# Patient Record
Sex: FEMALE | Race: WHITE | NOT HISPANIC OR LATINO | Employment: FULL TIME | URBAN - METROPOLITAN AREA
[De-identification: names, ages, dates, MRNs, and addresses within clinical notes are randomized per-mention and may not be internally consistent; named-entity substitution may affect disease eponyms.]

---

## 2010-03-04 LAB — EXTERNAL HIV SCREEN: NORMAL

## 2017-02-26 ENCOUNTER — HOSPITAL ENCOUNTER (EMERGENCY)
Facility: HOSPITAL | Age: 37
Discharge: HOME | End: 2017-02-26
Attending: EMERGENCY MEDICINE | Admitting: EMERGENCY MEDICINE
Payer: COMMERCIAL

## 2017-02-26 VITALS
HEIGHT: 63 IN | SYSTOLIC BLOOD PRESSURE: 129 MMHG | BODY MASS INDEX: 20.66 KG/M2 | DIASTOLIC BLOOD PRESSURE: 66 MMHG | WEIGHT: 116.62 LBS | OXYGEN SATURATION: 100 % | TEMPERATURE: 98.3 F | RESPIRATION RATE: 18 BRPM | HEART RATE: 79 BPM

## 2017-02-26 DIAGNOSIS — IMO0002 ABSCESS, ABDOMEN: ICD-10-CM

## 2017-02-26 DIAGNOSIS — L02.01 ABSCESS OF CHIN: Primary | ICD-10-CM

## 2017-02-26 PROCEDURE — A9270 NON-COVERED ITEM OR SERVICE: HCPCS | Performed by: PHYSICIAN ASSISTANT

## 2017-02-26 PROCEDURE — 99282 EMERGENCY DEPT VISIT SF MDM: CPT

## 2017-02-26 RX ORDER — SULFAMETHOXAZOLE AND TRIMETHOPRIM 800; 160 MG/1; MG/1
1 TABLET ORAL DAILY
COMMUNITY
Start: 2017-02-23 | End: 2017-03-02

## 2017-02-26 RX ORDER — CEPHALEXIN 500 MG/1
500 CAPSULE ORAL 3 TIMES DAILY
Qty: 30 CAPSULE | Refills: 0 | Status: SHIPPED | OUTPATIENT
Start: 2017-02-26 | End: 2017-03-08

## 2017-02-26 RX ORDER — SULFAMETHOXAZOLE AND TRIMETHOPRIM 800; 160 MG/1; MG/1
1 TABLET ORAL ONCE
Status: COMPLETED | OUTPATIENT
Start: 2017-02-26 | End: 2017-02-26

## 2017-02-26 RX ORDER — CHLORHEXIDINE GLUCONATE 4 G/100ML
1 SOLUTION TOPICAL DAILY PRN
Qty: 118 ML | Refills: 0 | Status: SHIPPED | OUTPATIENT
Start: 2017-02-26 | End: 2017-03-28

## 2017-02-26 RX ORDER — CEPHALEXIN 500 MG/1
500 CAPSULE ORAL ONCE
Status: COMPLETED | OUTPATIENT
Start: 2017-02-26 | End: 2017-02-26

## 2017-02-26 RX ORDER — SULFAMETHOXAZOLE AND TRIMETHOPRIM 800; 160 MG/1; MG/1
1 TABLET ORAL 2 TIMES DAILY
Qty: 20 TABLET | Refills: 0 | Status: SHIPPED | OUTPATIENT
Start: 2017-02-26 | End: 2017-03-08

## 2017-02-26 RX ADMIN — Medication 1 APPLICATION: at 10:27

## 2017-02-26 RX ADMIN — SULFAMETHOXAZOLE AND TRIMETHOPRIM 1 TABLET: 800; 160 TABLET ORAL at 10:27

## 2017-02-26 RX ADMIN — CEPHALEXIN 500 MG: 500 CAPSULE ORAL at 10:27

## 2017-09-10 ENCOUNTER — APPOINTMENT (EMERGENCY)
Dept: CT IMAGING | Facility: HOSPITAL | Age: 37
End: 2017-09-10
Payer: COMMERCIAL

## 2017-09-10 ENCOUNTER — HOSPITAL ENCOUNTER (EMERGENCY)
Facility: HOSPITAL | Age: 37
Discharge: HOME | End: 2017-09-10
Attending: EMERGENCY MEDICINE
Payer: COMMERCIAL

## 2017-09-10 VITALS
TEMPERATURE: 98.4 F | RESPIRATION RATE: 16 BRPM | HEART RATE: 73 BPM | HEIGHT: 63 IN | WEIGHT: 116.84 LBS | OXYGEN SATURATION: 100 % | SYSTOLIC BLOOD PRESSURE: 98 MMHG | DIASTOLIC BLOOD PRESSURE: 51 MMHG | BODY MASS INDEX: 20.7 KG/M2

## 2017-09-10 DIAGNOSIS — R10.9 ABDOMINAL PAIN: Primary | ICD-10-CM

## 2017-09-10 LAB
ALBUMIN SERPL BCP-MCNC: 4 G/DL (ref 3.5–5)
ALP SERPL-CCNC: 53 U/L (ref 46–116)
ALT SERPL W P-5'-P-CCNC: 15 U/L (ref 12–78)
ANION GAP SERPL CALCULATED.3IONS-SCNC: 7 MMOL/L (ref 4–13)
AST SERPL W P-5'-P-CCNC: 13 U/L (ref 5–45)
BASOPHILS # BLD AUTO: 0.07 THOUSANDS/ΜL (ref 0–0.1)
BASOPHILS NFR BLD AUTO: 1 % (ref 0–1)
BILIRUB SERPL-MCNC: 0.4 MG/DL (ref 0.2–1)
BILIRUB UR QL STRIP: NEGATIVE
BUN SERPL-MCNC: 16 MG/DL (ref 5–25)
CALCIUM SERPL-MCNC: 8.9 MG/DL (ref 8.3–10.1)
CHLORIDE SERPL-SCNC: 104 MMOL/L (ref 100–108)
CLARITY UR: CLEAR
CO2 SERPL-SCNC: 30 MMOL/L (ref 21–32)
COLOR UR: YELLOW
CREAT SERPL-MCNC: 0.73 MG/DL (ref 0.6–1.3)
EOSINOPHIL # BLD AUTO: 0.45 THOUSAND/ΜL (ref 0–0.61)
EOSINOPHIL NFR BLD AUTO: 6 % (ref 0–6)
ERYTHROCYTE [DISTWIDTH] IN BLOOD BY AUTOMATED COUNT: 12 % (ref 11.6–15.1)
GFR SERPL CREATININE-BSD FRML MDRD: 107 ML/MIN/1.73SQ M
GLUCOSE SERPL-MCNC: 90 MG/DL (ref 65–140)
GLUCOSE UR STRIP-MCNC: NEGATIVE MG/DL
HCG UR QL: NEGATIVE
HCT VFR BLD AUTO: 41.8 % (ref 34.8–46.1)
HGB BLD-MCNC: 14 G/DL (ref 11.5–15.4)
HGB UR QL STRIP.AUTO: NEGATIVE
KETONES UR STRIP-MCNC: NEGATIVE MG/DL
LACTATE SERPL-SCNC: 0.8 MMOL/L (ref 0.5–2)
LEUKOCYTE ESTERASE UR QL STRIP: NEGATIVE
LIPASE SERPL-CCNC: 215 U/L (ref 73–393)
LYMPHOCYTES # BLD AUTO: 1.72 THOUSANDS/ΜL (ref 0.6–4.47)
LYMPHOCYTES NFR BLD AUTO: 23 % (ref 14–44)
MCH RBC QN AUTO: 31 PG (ref 26.8–34.3)
MCHC RBC AUTO-ENTMCNC: 33.5 G/DL (ref 31.4–37.4)
MCV RBC AUTO: 93 FL (ref 82–98)
MONOCYTES # BLD AUTO: 0.51 THOUSAND/ΜL (ref 0.17–1.22)
MONOCYTES NFR BLD AUTO: 7 % (ref 4–12)
NEUTROPHILS # BLD AUTO: 4.59 THOUSANDS/ΜL (ref 1.85–7.62)
NEUTS SEG NFR BLD AUTO: 62 % (ref 43–75)
NITRITE UR QL STRIP: NEGATIVE
NRBC BLD AUTO-RTO: 0 /100 WBCS
PH UR STRIP.AUTO: 6 [PH] (ref 4.5–8)
PLATELET # BLD AUTO: 266 THOUSANDS/UL (ref 149–390)
PMV BLD AUTO: 9 FL (ref 8.9–12.7)
POTASSIUM SERPL-SCNC: 4 MMOL/L (ref 3.5–5.3)
PROT SERPL-MCNC: 7.8 G/DL (ref 6.4–8.2)
PROT UR STRIP-MCNC: NEGATIVE MG/DL
RBC # BLD AUTO: 4.52 MILLION/UL (ref 3.81–5.12)
SODIUM SERPL-SCNC: 141 MMOL/L (ref 136–145)
SP GR UR STRIP.AUTO: 1.02 (ref 1–1.03)
UROBILINOGEN UR QL STRIP.AUTO: 0.2 E.U./DL
WBC # BLD AUTO: 7.37 THOUSAND/UL (ref 4.31–10.16)

## 2017-09-10 PROCEDURE — 81003 URINALYSIS AUTO W/O SCOPE: CPT | Performed by: EMERGENCY MEDICINE

## 2017-09-10 PROCEDURE — 96374 THER/PROPH/DIAG INJ IV PUSH: CPT

## 2017-09-10 PROCEDURE — 81025 URINE PREGNANCY TEST: CPT | Performed by: EMERGENCY MEDICINE

## 2017-09-10 PROCEDURE — 85025 COMPLETE CBC W/AUTO DIFF WBC: CPT | Performed by: EMERGENCY MEDICINE

## 2017-09-10 PROCEDURE — 87491 CHLMYD TRACH DNA AMP PROBE: CPT | Performed by: EMERGENCY MEDICINE

## 2017-09-10 PROCEDURE — 83605 ASSAY OF LACTIC ACID: CPT | Performed by: EMERGENCY MEDICINE

## 2017-09-10 PROCEDURE — 80053 COMPREHEN METABOLIC PANEL: CPT | Performed by: EMERGENCY MEDICINE

## 2017-09-10 PROCEDURE — 96361 HYDRATE IV INFUSION ADD-ON: CPT

## 2017-09-10 PROCEDURE — 74177 CT ABD & PELVIS W/CONTRAST: CPT

## 2017-09-10 PROCEDURE — 83690 ASSAY OF LIPASE: CPT | Performed by: EMERGENCY MEDICINE

## 2017-09-10 PROCEDURE — 87591 N.GONORRHOEAE DNA AMP PROB: CPT | Performed by: EMERGENCY MEDICINE

## 2017-09-10 PROCEDURE — 36415 COLL VENOUS BLD VENIPUNCTURE: CPT | Performed by: EMERGENCY MEDICINE

## 2017-09-10 PROCEDURE — 99284 EMERGENCY DEPT VISIT MOD MDM: CPT

## 2017-09-10 RX ORDER — KETOROLAC TROMETHAMINE 30 MG/ML
15 INJECTION, SOLUTION INTRAMUSCULAR; INTRAVENOUS ONCE
Status: COMPLETED | OUTPATIENT
Start: 2017-09-10 | End: 2017-09-10

## 2017-09-10 RX ORDER — POLYETHYLENE GLYCOL 3350 17 G/17G
17 POWDER, FOR SOLUTION ORAL DAILY PRN
Qty: 5 EACH | Refills: 0 | Status: SHIPPED | OUTPATIENT
Start: 2017-09-10 | End: 2020-01-17

## 2017-09-10 RX ORDER — NAPROXEN 500 MG/1
500 TABLET ORAL EVERY 12 HOURS PRN
Qty: 20 TABLET | Refills: 0 | Status: SHIPPED | OUTPATIENT
Start: 2017-09-10 | End: 2020-01-17

## 2017-09-10 RX ADMIN — KETOROLAC TROMETHAMINE 15 MG: 30 INJECTION, SOLUTION INTRAMUSCULAR at 08:06

## 2017-09-10 RX ADMIN — SODIUM CHLORIDE 1000 ML: 0.9 INJECTION, SOLUTION INTRAVENOUS at 08:06

## 2017-09-10 RX ADMIN — IOHEXOL 100 ML: 350 INJECTION, SOLUTION INTRAVENOUS at 09:16

## 2017-09-13 LAB
CHLAMYDIA DNA CVX QL NAA+PROBE: NORMAL
N GONORRHOEA DNA GENITAL QL NAA+PROBE: NORMAL

## 2019-08-08 LAB — HCV AB SER-ACNC: NEGATIVE

## 2020-01-17 ENCOUNTER — APPOINTMENT (EMERGENCY)
Dept: CT IMAGING | Facility: HOSPITAL | Age: 40
End: 2020-01-17
Payer: COMMERCIAL

## 2020-01-17 ENCOUNTER — APPOINTMENT (EMERGENCY)
Dept: ULTRASOUND IMAGING | Facility: HOSPITAL | Age: 40
End: 2020-01-17
Payer: COMMERCIAL

## 2020-01-17 ENCOUNTER — HOSPITAL ENCOUNTER (EMERGENCY)
Facility: HOSPITAL | Age: 40
Discharge: HOME | End: 2020-01-17
Attending: EMERGENCY MEDICINE | Admitting: EMERGENCY MEDICINE
Payer: COMMERCIAL

## 2020-01-17 VITALS
TEMPERATURE: 98.2 F | HEIGHT: 63 IN | RESPIRATION RATE: 18 BRPM | WEIGHT: 128.31 LBS | HEART RATE: 74 BPM | SYSTOLIC BLOOD PRESSURE: 111 MMHG | BODY MASS INDEX: 22.73 KG/M2 | OXYGEN SATURATION: 99 % | DIASTOLIC BLOOD PRESSURE: 55 MMHG

## 2020-01-17 DIAGNOSIS — N83.209 RUPTURED OVARIAN CYST: Primary | ICD-10-CM

## 2020-01-17 LAB
ALBUMIN SERPL BCP-MCNC: 3.9 G/DL (ref 3.5–5)
ALP SERPL-CCNC: 61 U/L (ref 46–116)
ALT SERPL W P-5'-P-CCNC: 24 U/L (ref 12–78)
ANION GAP SERPL CALCULATED.3IONS-SCNC: 9 MMOL/L (ref 4–13)
APTT PPP: 27 SECONDS (ref 23–37)
AST SERPL W P-5'-P-CCNC: 14 U/L (ref 5–45)
BASOPHILS # BLD AUTO: 0.07 THOUSANDS/ΜL (ref 0–0.1)
BASOPHILS NFR BLD AUTO: 1 % (ref 0–1)
BILIRUB SERPL-MCNC: 0.27 MG/DL (ref 0.2–1)
BILIRUB UR QL STRIP: NEGATIVE
BUN SERPL-MCNC: 13 MG/DL (ref 5–25)
CALCIUM SERPL-MCNC: 8.2 MG/DL (ref 8.3–10.1)
CHLORIDE SERPL-SCNC: 105 MMOL/L (ref 100–108)
CLARITY UR: NORMAL
CO2 SERPL-SCNC: 30 MMOL/L (ref 21–32)
COLOR UR: YELLOW
CREAT SERPL-MCNC: 0.87 MG/DL (ref 0.6–1.3)
EOSINOPHIL # BLD AUTO: 0.31 THOUSAND/ΜL (ref 0–0.61)
EOSINOPHIL NFR BLD AUTO: 3 % (ref 0–6)
ERYTHROCYTE [DISTWIDTH] IN BLOOD BY AUTOMATED COUNT: 12.1 % (ref 11.6–15.1)
EXT PREG TEST URINE: NEGATIVE
EXT. CONTROL ED NAV: NEGATIVE
GFR SERPL CREATININE-BSD FRML MDRD: 85 ML/MIN/1.73SQ M
GLUCOSE SERPL-MCNC: 115 MG/DL (ref 65–140)
GLUCOSE UR STRIP-MCNC: NEGATIVE MG/DL
HCT VFR BLD AUTO: 39.2 % (ref 34.8–46.1)
HGB BLD-MCNC: 13.4 G/DL (ref 11.5–15.4)
HGB UR QL STRIP.AUTO: NEGATIVE
IMM GRANULOCYTES # BLD AUTO: 0.05 THOUSAND/UL (ref 0–0.2)
IMM GRANULOCYTES NFR BLD AUTO: 1 % (ref 0–2)
INR PPP: 0.96 (ref 0.84–1.19)
KETONES UR STRIP-MCNC: NEGATIVE MG/DL
LEUKOCYTE ESTERASE UR QL STRIP: NEGATIVE
LIPASE SERPL-CCNC: 267 U/L (ref 73–393)
LYMPHOCYTES # BLD AUTO: 2.05 THOUSANDS/ΜL (ref 0.6–4.47)
LYMPHOCYTES NFR BLD AUTO: 22 % (ref 14–44)
MCH RBC QN AUTO: 31.4 PG (ref 26.8–34.3)
MCHC RBC AUTO-ENTMCNC: 34.2 G/DL (ref 31.4–37.4)
MCV RBC AUTO: 92 FL (ref 82–98)
MONOCYTES # BLD AUTO: 0.77 THOUSAND/ΜL (ref 0.17–1.22)
MONOCYTES NFR BLD AUTO: 8 % (ref 4–12)
NEUTROPHILS # BLD AUTO: 6.15 THOUSANDS/ΜL (ref 1.85–7.62)
NEUTS SEG NFR BLD AUTO: 65 % (ref 43–75)
NITRITE UR QL STRIP: NEGATIVE
NRBC BLD AUTO-RTO: 0 /100 WBCS
PH UR STRIP.AUTO: 6 [PH]
PLATELET # BLD AUTO: 285 THOUSANDS/UL (ref 149–390)
PMV BLD AUTO: 9.4 FL (ref 8.9–12.7)
POTASSIUM SERPL-SCNC: 3.5 MMOL/L (ref 3.5–5.3)
PROT SERPL-MCNC: 7.7 G/DL (ref 6.4–8.2)
PROT UR STRIP-MCNC: NEGATIVE MG/DL
PROTHROMBIN TIME: 12.8 SECONDS (ref 11.6–14.5)
RBC # BLD AUTO: 4.27 MILLION/UL (ref 3.81–5.12)
SODIUM SERPL-SCNC: 144 MMOL/L (ref 136–145)
SP GR UR STRIP.AUTO: >=1.03 (ref 1–1.03)
UROBILINOGEN UR QL STRIP.AUTO: 0.2 E.U./DL
WBC # BLD AUTO: 9.4 THOUSAND/UL (ref 4.31–10.16)

## 2020-01-17 PROCEDURE — 83690 ASSAY OF LIPASE: CPT | Performed by: EMERGENCY MEDICINE

## 2020-01-17 PROCEDURE — 96374 THER/PROPH/DIAG INJ IV PUSH: CPT

## 2020-01-17 PROCEDURE — 99284 EMERGENCY DEPT VISIT MOD MDM: CPT

## 2020-01-17 PROCEDURE — 81025 URINE PREGNANCY TEST: CPT | Performed by: EMERGENCY MEDICINE

## 2020-01-17 PROCEDURE — 81003 URINALYSIS AUTO W/O SCOPE: CPT | Performed by: EMERGENCY MEDICINE

## 2020-01-17 PROCEDURE — 76856 US EXAM PELVIC COMPLETE: CPT

## 2020-01-17 PROCEDURE — 85730 THROMBOPLASTIN TIME PARTIAL: CPT | Performed by: EMERGENCY MEDICINE

## 2020-01-17 PROCEDURE — 99284 EMERGENCY DEPT VISIT MOD MDM: CPT | Performed by: EMERGENCY MEDICINE

## 2020-01-17 PROCEDURE — 74177 CT ABD & PELVIS W/CONTRAST: CPT

## 2020-01-17 PROCEDURE — 85610 PROTHROMBIN TIME: CPT | Performed by: EMERGENCY MEDICINE

## 2020-01-17 PROCEDURE — 85025 COMPLETE CBC W/AUTO DIFF WBC: CPT | Performed by: EMERGENCY MEDICINE

## 2020-01-17 PROCEDURE — 80053 COMPREHEN METABOLIC PANEL: CPT | Performed by: EMERGENCY MEDICINE

## 2020-01-17 PROCEDURE — 76830 TRANSVAGINAL US NON-OB: CPT

## 2020-01-17 PROCEDURE — 36415 COLL VENOUS BLD VENIPUNCTURE: CPT | Performed by: EMERGENCY MEDICINE

## 2020-01-17 PROCEDURE — 96361 HYDRATE IV INFUSION ADD-ON: CPT

## 2020-01-17 PROCEDURE — 96375 TX/PRO/DX INJ NEW DRUG ADDON: CPT

## 2020-01-17 RX ORDER — MORPHINE SULFATE 4 MG/ML
2 INJECTION, SOLUTION INTRAMUSCULAR; INTRAVENOUS ONCE
Status: DISCONTINUED | OUTPATIENT
Start: 2020-01-17 | End: 2020-01-17

## 2020-01-17 RX ORDER — ESCITALOPRAM OXALATE 10 MG/1
10 TABLET ORAL DAILY
COMMUNITY
Start: 2019-09-04

## 2020-01-17 RX ORDER — KETOROLAC TROMETHAMINE 30 MG/ML
30 INJECTION, SOLUTION INTRAMUSCULAR; INTRAVENOUS ONCE
Status: COMPLETED | OUTPATIENT
Start: 2020-01-17 | End: 2020-01-17

## 2020-01-17 RX ORDER — ONDANSETRON 2 MG/ML
4 INJECTION INTRAMUSCULAR; INTRAVENOUS ONCE
Status: COMPLETED | OUTPATIENT
Start: 2020-01-17 | End: 2020-01-17

## 2020-01-17 RX ADMIN — KETOROLAC TROMETHAMINE 30 MG: 30 INJECTION, SOLUTION INTRAMUSCULAR at 18:24

## 2020-01-17 RX ADMIN — IOHEXOL 100 ML: 350 INJECTION, SOLUTION INTRAVENOUS at 19:39

## 2020-01-17 RX ADMIN — ONDANSETRON 4 MG: 2 INJECTION INTRAMUSCULAR; INTRAVENOUS at 18:24

## 2020-01-17 RX ADMIN — SODIUM CHLORIDE 1000 ML: 0.9 INJECTION, SOLUTION INTRAVENOUS at 18:24

## 2020-01-17 NOTE — ED PROVIDER NOTES
History  Chief Complaint   Patient presents with    Abdominal Pain     pt c/o sudden onset bilateral lower abdominal pain radiates to URQ w/nausea 1 hr ago  denies v/d     Patient is a 61-year-old female with history of prior kidney stone and tubal ligation presents the emergency department complaining of acute onset of lower abdominal pain associated with nausea vomiting no diarrhea or constipation no fever or chills abruptly today after having a bowel movement which she describes as normal solid stool  No vaginal bleeding or discharge  History provided by:  Patient  Abdominal Pain   Pain location:  RLQ and suprapubic  Pain quality: aching and cramping    Pain radiates to:  LLQ  Pain severity:  Moderate  Onset quality:  Sudden  Duration:  3 hours  Timing:  Constant  Progression:  Worsening  Chronicity:  New  Associated symptoms: nausea and vomiting    Associated symptoms: no chest pain, no chills, no constipation, no cough, no diarrhea, no dysuria, no fatigue, no fever, no hematuria, no shortness of breath and no sore throat        Prior to Admission Medications   Prescriptions Last Dose Informant Patient Reported? Taking?   escitalopram (LEXAPRO) 10 mg tablet   Yes Yes   Sig: Take 10 mg by mouth daily      Facility-Administered Medications: None       History reviewed  No pertinent past medical history  Past Surgical History:   Procedure Laterality Date    AUGMENTATION BREAST      FRACTURE SURGERY      KIDNEY STONE SURGERY         History reviewed  No pertinent family history  I have reviewed and agree with the history as documented  Social History     Tobacco Use    Smoking status: Never Smoker    Smokeless tobacco: Never Used   Substance Use Topics    Alcohol use: Yes     Alcohol/week: 2 0 standard drinks     Types: 2 Shots of liquor per week    Drug use: No        Review of Systems   Constitutional: Negative for activity change, appetite change, chills, fatigue and fever     HENT: Negative for congestion, ear pain, rhinorrhea and sore throat  Eyes: Negative for discharge, redness and visual disturbance  Respiratory: Negative for cough, chest tightness, shortness of breath and wheezing  Cardiovascular: Negative for chest pain and palpitations  Gastrointestinal: Positive for abdominal pain, nausea and vomiting  Negative for constipation and diarrhea  Endocrine: Negative for polydipsia and polyuria  Genitourinary: Negative for difficulty urinating, dysuria, frequency, hematuria and urgency  Musculoskeletal: Negative for arthralgias and myalgias  Skin: Negative for color change, pallor and rash  Neurological: Negative for dizziness, weakness, light-headedness, numbness and headaches  Hematological: Negative for adenopathy  Does not bruise/bleed easily  All other systems reviewed and are negative  Physical Exam  Physical Exam   Constitutional: She is oriented to person, place, and time  She appears well-developed and well-nourished  HENT:   Head: Normocephalic and atraumatic  Right Ear: External ear normal    Left Ear: External ear normal    Nose: Nose normal    Mouth/Throat: Oropharynx is clear and moist    Eyes: Pupils are equal, round, and reactive to light  Conjunctivae and EOM are normal    Neck: Normal range of motion  Neck supple  Cardiovascular: Normal rate, regular rhythm, normal heart sounds and intact distal pulses  Pulmonary/Chest: Effort normal and breath sounds normal  No respiratory distress  She has no wheezes  She has no rales  She exhibits no tenderness  Abdominal: Soft  Bowel sounds are normal  She exhibits no distension  There is generalized tenderness and tenderness in the right lower quadrant, suprapubic area and left lower quadrant  There is no rigidity, no rebound and no guarding  Musculoskeletal: Normal range of motion  Neurological: She is alert and oriented to person, place, and time  No cranial nerve deficit or sensory deficit     Skin: Skin is warm and dry  Psychiatric: She has a normal mood and affect  Nursing note and vitals reviewed        Vital Signs  ED Triage Vitals [01/17/20 1753]   Temperature Pulse Respirations Blood Pressure SpO2   98 2 °F (36 8 °C) 71 18 115/56 100 %      Temp Source Heart Rate Source Patient Position - Orthostatic VS BP Location FiO2 (%)   Temporal Monitor Lying Left arm --      Pain Score       8           Vitals:    01/17/20 1753 01/17/20 2100 01/17/20 2138   BP: 115/56  111/55   Pulse: 71 77 74   Patient Position - Orthostatic VS: Lying  Sitting         Visual Acuity      ED Medications  Medications   sodium chloride 0 9 % bolus 1,000 mL (0 mL Intravenous Stopped 1/17/20 1940)   ketorolac (TORADOL) injection 30 mg (30 mg Intravenous Given 1/17/20 1824)   ondansetron (ZOFRAN) injection 4 mg (4 mg Intravenous Given 1/17/20 1824)   iohexol (OMNIPAQUE) 350 MG/ML injection (MULTI-DOSE) 100 mL (100 mL Intravenous Given 1/17/20 1939)       Diagnostic Studies  Results Reviewed     Procedure Component Value Units Date/Time    POCT pregnancy, urine [[id number]]  (Normal) Resulted:  01/17/20 1925    Lab Status:  Final result Updated:  01/17/20 1926     EXT PREG TEST UR (Ref: Negative) negative     Control negative    Protime-INR [[id number]]  (Normal) Collected:  01/17/20 1825    Lab Status:  Final result Specimen:  Blood from Arm, Right Updated:  01/17/20 1857     Protime 12 8 seconds      INR 0 96    APTT [[id number]]  (Normal) Collected:  01/17/20 1825    Lab Status:  Final result Specimen:  Blood from Arm, Right Updated:  01/17/20 1857     PTT 27 seconds     Lipase [[id number]]  (Normal) Collected:  01/17/20 1825    Lab Status:  Final result Specimen:  Blood from Arm, Right Updated:  01/17/20 1856     Lipase 267 u/L     Comprehensive metabolic panel [[id number]]  (Abnormal) Collected:  01/17/20 1825    Lab Status:  Final result Specimen:  Blood from Arm, Right Updated:  01/17/20 1856     Sodium 144 mmol/L      Potassium 3 5 mmol/L      Chloride 105 mmol/L      CO2 30 mmol/L      ANION GAP 9 mmol/L      BUN 13 mg/dL      Creatinine 0 87 mg/dL      Glucose 115 mg/dL      Calcium 8 2 mg/dL      AST 14 U/L      ALT 24 U/L      Alkaline Phosphatase 61 U/L      Total Protein 7 7 g/dL      Albumin 3 9 g/dL      Total Bilirubin 0 27 mg/dL      eGFR 85 ml/min/1 73sq m     Narrative:       [employer] guidelines for Chronic Kidney Disease (CKD):     Stage 1 with normal or high GFR (GFR > 90 mL/min/1 73 square meters)    Stage 2 Mild CKD (GFR = 60-89 mL/min/1 73 square meters)    Stage 3A Moderate CKD (GFR = 45-59 mL/min/1 73 square meters)    Stage 3B Moderate CKD (GFR = 30-44 mL/min/1 73 square meters)    Stage 4 Severe CKD (GFR = 15-29 mL/min/1 73 square meters)    Stage 5 End Stage CKD (GFR <15 mL/min/1 73 square meters)  Note: GFR calculation is accurate only with a steady state creatinine    UA w Reflex to Microscopic w Reflex to Culture [[id number]] Collected:  01/17/20 1829    Lab Status:  Final result Specimen:  Urine, Clean Catch Updated:  01/17/20 1839     Color, UA Yellow     Clarity, UA Slightly Cloudy     Specific Gravity, UA >=1 030     pH, UA 6 0     Leukocytes, UA Negative     Nitrite, UA Negative     Protein, UA Negative mg/dl      Glucose, UA Negative mg/dl      Ketones, UA Negative mg/dl      Urobilinogen, UA 0 2 E U /dl      Bilirubin, UA Negative     Blood, UA Negative    CBC and differential [[id number]] Collected:  01/17/20 1825    Lab Status:  Final result Specimen:  Blood from Arm, Right Updated:  01/17/20 1839     WBC 9 40 Thousand/uL      RBC 4 27 Million/uL      Hemoglobin 13 4 g/dL      Hematocrit 39 2 %      MCV 92 fL      MCH 31 4 pg      MCHC 34 2 g/dL      RDW 12 1 %      MPV 9 4 fL      Platelets 619 Thousands/uL      nRBC 0 /100 WBCs      Neutrophils Relative 65 %      Immat GRANS % 1 %      Lymphocytes Relative 22 %      Monocytes Relative 8 %      Eosinophils Relative 3 % Basophils Relative 1 %      Neutrophils Absolute 6 15 Thousands/µL      Immature Grans Absolute 0 05 Thousand/uL      Lymphocytes Absolute 2 05 Thousands/µL      Monocytes Absolute 0 77 Thousand/µL      Eosinophils Absolute 0 31 Thousand/µL      Basophils Absolute 0 07 Thousands/µL                  US pelvis complete w transvaginal   Final Result by Aisha Smallwood MD (01/17 2157)       Moderate amount of pelvic hemoperitoneum likely from partially ruptured right ovarian corpus luteum  Workstation performed: [id number]         CT abdomen pelvis with contrast   Final Result by Rubén Ramsey MD (01/17 2012)      Small volume hemoperitoneum, most likely from a ruptured right ovarian hemorrhagic cyst   However given the enlargement of the right ovary, recommend confirmation with ultrasound  The study was marked in [facility] for immediate notification  Workstation performed: [id number]                    Procedures  Procedures         ED Course                               MDM  Number of Diagnoses or Management Options  Ruptured ovarian cyst: new and requires workup  Diagnosis management comments: Patient remained clinically hemodynamically stable in the emergency department pain is improved in moderately controlled with IV Toradol and Zofran and fluids given in the emergency department  Workup is consistent with a ruptured right ovarian cyst no suspicious adnexal mass or loculated collections seen on pelvic ultrasound ultrasound reveals normal Doppler flow to both ovaries  Patient advised of findings of right ovarian cyst recommended ibuprofen rest supportive care plenty of fluids prompt follow-up with her gynecologist and PCP for further evaluation and treatment obtain test results return precautions and anticipatory guidance discussed           Amount and/or Complexity of Data Reviewed  Clinical lab tests: ordered and reviewed  Tests in the radiology section of CPT®: ordered and reviewed  Tests in the medicine section of CPT®: ordered and reviewed  Decide to obtain previous medical records or to obtain history from someone other than the patient: yes  Review and summarize past medical records: yes  Independent visualization of images, tracings, or specimens: yes    Risk of Complications, Morbidity, and/or Mortality  Presenting problems: moderate  Diagnostic procedures: moderate  Management options: moderate    Patient Progress  Patient progress: stable        Disposition  Final diagnoses:   Ruptured ovarian cyst - Right     Time reflects when diagnosis was documented in both MDM as applicable and the Disposition within this note     Time User Action Codes Description Comment    1/17/2020  8:49 PM Dexter Santiago Add [N83 [id number]] Ruptured ovarian cyst     1/17/2020  8:49 PM Dexter Santiago Modify [O35 [id number]] Ruptured ovarian cyst Right      ED Disposition     ED Disposition Condition Date/Time Comment    Discharge Stable Fri Jan 17, 2020 10:03 PM Jay Arita discharge to home/self care  Follow-up Information     Follow up With Specialties Details Why Contact Info    Deep Souza DO Family Medicine Schedule an appointment as soon as possible for a visit in 3 days  [address] [address]  [phone]        Schedule an appointment as soon as possible for a visit in 3 days Hemorrhagic right ovarian cyst Your gynecologist          Patient's Medications   Discharge Prescriptions    No medications on file     No discharge procedures on file      ED Provider  Electronically Signed by           Carlos Mills DO  01/17/20 2833

## 2020-01-17 NOTE — ED NOTES
Pt ringing call bell and requesting something to drink  Pt instructed that once all results from testing come back then we can give her something to drink  Pt understands        Jayla Zhang  01/17/20 3066

## 2020-01-18 NOTE — ED NOTES
Patient verbalized understanding of discharge instructions prior to leaving facility        Osman Hawk RN  01/17/20 9641

## 2020-11-19 DIAGNOSIS — U07.1 COVID-19: ICD-10-CM

## 2020-11-19 PROCEDURE — U0003 INFECTIOUS AGENT DETECTION BY NUCLEIC ACID (DNA OR RNA); SEVERE ACUTE RESPIRATORY SYNDROME CORONAVIRUS 2 (SARS-COV-2) (CORONAVIRUS DISEASE [COVID-19]), AMPLIFIED PROBE TECHNIQUE, MAKING USE OF HIGH THROUGHPUT TECHNOLOGIES AS DESCRIBED BY CMS-2020-01-R: HCPCS | Performed by: INTERNAL MEDICINE

## 2020-11-21 LAB — SARS-COV-2 RNA SPEC QL NAA+PROBE: DETECTED

## 2020-11-22 ENCOUNTER — TELEPHONE (OUTPATIENT)
Dept: DERMATOLOGY | Facility: CLINIC | Age: 40
End: 2020-11-22

## 2022-01-05 PROCEDURE — U0003 INFECTIOUS AGENT DETECTION BY NUCLEIC ACID (DNA OR RNA); SEVERE ACUTE RESPIRATORY SYNDROME CORONAVIRUS 2 (SARS-COV-2) (CORONAVIRUS DISEASE [COVID-19]), AMPLIFIED PROBE TECHNIQUE, MAKING USE OF HIGH THROUGHPUT TECHNOLOGIES AS DESCRIBED BY CMS-2020-01-R: HCPCS | Performed by: INTERNAL MEDICINE

## 2022-01-13 DIAGNOSIS — R10.2 PELVIC AND PERINEAL PAIN: ICD-10-CM

## 2022-01-20 ENCOUNTER — HOSPITAL ENCOUNTER (OUTPATIENT)
Dept: ULTRASOUND IMAGING | Facility: HOSPITAL | Age: 42
Discharge: HOME | End: 2022-01-20
Attending: OBSTETRICS & GYNECOLOGY
Payer: COMMERCIAL

## 2022-01-20 DIAGNOSIS — R10.2 PELVIC AND PERINEAL PAIN: ICD-10-CM

## 2022-01-20 PROCEDURE — 76830 TRANSVAGINAL US NON-OB: CPT

## 2022-01-20 PROCEDURE — 76856 US EXAM PELVIC COMPLETE: CPT

## 2022-06-26 ENCOUNTER — HOSPITAL ENCOUNTER (EMERGENCY)
Facility: HOSPITAL | Age: 42
Discharge: HOME | End: 2022-06-26
Attending: EMERGENCY MEDICINE | Admitting: EMERGENCY MEDICINE
Payer: COMMERCIAL

## 2022-06-26 VITALS
WEIGHT: 138 LBS | DIASTOLIC BLOOD PRESSURE: 60 MMHG | TEMPERATURE: 98 F | HEART RATE: 65 BPM | OXYGEN SATURATION: 100 % | HEIGHT: 63 IN | SYSTOLIC BLOOD PRESSURE: 108 MMHG | BODY MASS INDEX: 24.45 KG/M2 | RESPIRATION RATE: 18 BRPM

## 2022-06-26 DIAGNOSIS — R10.2 PELVIC PAIN: Primary | ICD-10-CM

## 2022-06-26 DIAGNOSIS — R31.9 HEMATURIA: ICD-10-CM

## 2022-06-26 LAB
BACTERIA UR QL AUTO: NORMAL /HPF
BILIRUB UR QL STRIP: NEGATIVE
CLARITY UR: CLEAR
COLOR UR: YELLOW
EXT PREG TEST URINE: NEGATIVE
EXT. CONTROL ED NAV: NORMAL
GLUCOSE UR STRIP-MCNC: NEGATIVE MG/DL
HGB UR QL STRIP.AUTO: ABNORMAL
KETONES UR STRIP-MCNC: NEGATIVE MG/DL
LEUKOCYTE ESTERASE UR QL STRIP: NEGATIVE
NITRITE UR QL STRIP: NEGATIVE
NON-SQ EPI CELLS URNS QL MICRO: NORMAL /HPF
PH UR STRIP.AUTO: 7 [PH]
PROT UR STRIP-MCNC: NEGATIVE MG/DL
RBC #/AREA URNS AUTO: NORMAL /HPF
SP GR UR STRIP.AUTO: 1.01 (ref 1–1.03)
UROBILINOGEN UR QL STRIP.AUTO: 0.2 E.U./DL
WBC #/AREA URNS AUTO: NORMAL /HPF

## 2022-06-26 PROCEDURE — 87591 N.GONORRHOEAE DNA AMP PROB: CPT | Performed by: PHYSICIAN ASSISTANT

## 2022-06-26 PROCEDURE — 99284 EMERGENCY DEPT VISIT MOD MDM: CPT

## 2022-06-26 PROCEDURE — 99282 EMERGENCY DEPT VISIT SF MDM: CPT | Performed by: PHYSICIAN ASSISTANT

## 2022-06-26 PROCEDURE — 87491 CHLMYD TRACH DNA AMP PROBE: CPT | Performed by: PHYSICIAN ASSISTANT

## 2022-06-26 PROCEDURE — 81001 URINALYSIS AUTO W/SCOPE: CPT | Performed by: PHYSICIAN ASSISTANT

## 2022-06-26 PROCEDURE — 81025 URINE PREGNANCY TEST: CPT | Performed by: PHYSICIAN ASSISTANT

## 2022-06-26 RX ORDER — IBUPROFEN 400 MG/1
400 TABLET ORAL EVERY 6 HOURS PRN
Qty: 30 TABLET | Refills: 0 | Status: SHIPPED | OUTPATIENT
Start: 2022-06-26 | End: 2022-07-26

## 2022-06-26 NOTE — ED PROVIDER NOTES
History  Chief Complaint   Patient presents with    Pelvic Pain     Pt c/o pelvic pain and abnormal menstrual period  38 y o  female with past medical history significant for kidney stones presents to ED with chief complaint of pelvic pain and spotting  Onset of symptoms reported as 1 day ago  Location of symptoms reported as left lower pelvis  Quality is reported as intermittent sharp cramping pain associated with vaginal spotting  Severity is reported as mild  Associated symptoms:  Denies vaginal discharge  Denies dysuria  Denies fevers or chills  Denies nausea or vomiting  Denies flank pain  Modifying factors:  Tried OTC medications without relief    Context: patient reports her period is 14 days late  Started vaginal spotting 1 day ago  Developed left lower quadrant pain for 1 day  Reports pain is intermittent and cramping in nature, currently no pain during evaluation   No vaginal discharge other than the bleeding  No fevers or chills  No nausea vomiting or diarrhea  Past medical history reviewed remarkable for kidney stones  Past surgical history reviewed remarkable for kidney stone surgery and breast augmentation  Social history reviewed  Nonsmoker  Consumes 2 drinks of alcohol per week  No IV drug use  Past reviewed past medical history and visits via [facility]:  Patient seen in the emergency department on 9/10/2017 for abdominal pain        History provided by:  Patient   used: No        Prior to Admission Medications   Prescriptions Last Dose Informant Patient Reported? Taking?   escitalopram (LEXAPRO) 10 mg tablet Not Taking at Unknown time  Yes No   Sig: Take 10 mg by mouth daily   Patient not taking: Reported on 6/26/2022      Facility-Administered Medications: None       History reviewed  No pertinent past medical history      Past Surgical History:   Procedure Laterality Date    AUGMENTATION BREAST      FRACTURE SURGERY      KIDNEY STONE SURGERY         History reviewed  No pertinent family history  I have reviewed and agree with the history as documented  E-Cigarette/Vaping     E-Cigarette/Vaping Substances     Social History     Tobacco Use    Smoking status: Never Smoker    Smokeless tobacco: Never Used   Substance Use Topics    Alcohol use: Yes     Alcohol/week: 2 0 standard drinks     Types: 2 Shots of liquor per week    Drug use: No       Review of Systems   Constitutional: Negative for fever  Respiratory: Negative for chest tightness and shortness of breath  Cardiovascular: Negative for chest pain and palpitations  Gastrointestinal: Positive for abdominal pain  Negative for diarrhea and vomiting  Genitourinary: Positive for menstrual problem, pelvic pain and vaginal bleeding  Negative for decreased urine volume, difficulty urinating, dysuria, flank pain, frequency, genital sores, hematuria, urgency, vaginal discharge and vaginal pain  Musculoskeletal: Negative for back pain, joint swelling and myalgias  Neurological: Negative for dizziness, tremors, syncope, weakness and headaches  All other systems reviewed and are negative  Physical Exam  Physical Exam  Vitals and nursing note reviewed  Constitutional:       General: She is not in acute distress  Appearance: Normal appearance  She is well-developed  She is not ill-appearing  Comments: /60 (BP Location: Right arm)   Pulse 65   Temp 98 °F (36 7 °C) (Oral)   Resp 18   Ht 5' 3" (1 6 m)   Wt 62 6 kg (138 lb)   LMP 05/17/2022 (Exact Date)   SpO2 100%   BMI 24 45 kg/m²      HENT:      Head: Normocephalic and atraumatic  Right Ear: External ear normal       Left Ear: External ear normal       Nose: Nose normal       Mouth/Throat:      Mouth: Mucous membranes are moist    Eyes:      General: No scleral icterus  Right eye: No discharge  Left eye: No discharge  Extraocular Movements: Extraocular movements intact        Conjunctiva/sclera: Conjunctivae normal    Cardiovascular:      Rate and Rhythm: Normal rate  Pulses: Normal pulses  Pulmonary:      Effort: Pulmonary effort is normal  No respiratory distress  Abdominal:      Palpations: There is no mass  Tenderness: There is no right CVA tenderness, left CVA tenderness, guarding or rebound  Hernia: No hernia is present  Musculoskeletal:         General: No swelling, tenderness, deformity or signs of injury  Normal range of motion  Cervical back: Normal range of motion and neck supple  No rigidity or tenderness  No muscular tenderness  Skin:     Capillary Refill: Capillary refill takes less than 2 seconds  Coloration: Skin is not jaundiced or pale  Findings: No erythema or rash  Neurological:      General: No focal deficit present  Mental Status: She is alert and oriented to person, place, and time  Mental status is at baseline  Cranial Nerves: No cranial nerve deficit  Sensory: No sensory deficit  Motor: No weakness  Gait: Gait normal    Psychiatric:         Mood and Affect: Mood normal          Behavior: Behavior normal          Thought Content:  Thought content normal          Judgment: Judgment normal          Vital Signs  ED Triage Vitals [06/26/22 0856]   Temperature Pulse Respirations Blood Pressure SpO2   98 1 °F (36 7 °C) 79 18 119/67 100 %      Temp Source Heart Rate Source Patient Position - Orthostatic VS BP Location FiO2 (%)   Tympanic Monitor Sitting Left arm --      Pain Score       7           Vitals:    06/26/22 0856 06/26/22 1128   BP: 119/67 108/60   Pulse: 79 65   Patient Position - Orthostatic VS: Sitting Sitting         Visual Acuity      ED Medications  Medications - No data to display    Diagnostic Studies  Results Reviewed     Procedure Component Value Units Date/Time    Chlamydia/GC amplified DNA by PCR [[id number]]  (Normal) Collected: 06/26/22 1128    Lab Status: Final result Specimen: Urine, Other Updated: 06/27/22 1016     N gonorrhoeae, DNA Probe Negative     Chlamydia trachomatis, DNA Probe Negative    Narrative:      Test performed using PCR amplification of target DNA  This test is intended as an aid in the diagnosis of Chlamydial and gonococcal disease  This test has not been evaluated in patients younger than 15years of age and is not recommended for evaluation of suspected sexual abuse  Additional testing is recommended when the results do not correlate with clinical signs and symptoms  Urine Microscopic [[id number]]  (Normal) Collected: 06/26/22 1128    Lab Status: Final result Specimen: Urine, Clean Catch Updated: 06/26/22 1202     RBC, UA 2-4 /hpf      WBC, UA 0-1 /hpf      Epithelial Cells Occasional /hpf      Bacteria, UA Occasional /hpf     UA w Reflex to Microscopic w Reflex to Culture [[id number]]  (Abnormal) Collected: 06/26/22 1128    Lab Status: Final result Specimen: Urine, Clean Catch Updated: 06/26/22 1138     Color, UA Yellow     Clarity, UA Clear     Specific Gravity, UA 1 010     pH, UA 7 0     Leukocytes, UA Negative     Nitrite, UA Negative     Protein, UA Negative mg/dl      Glucose, UA Negative mg/dl      Ketones, UA Negative mg/dl      Urobilinogen, UA 0 2 E U /dl      Bilirubin, UA Negative     Occult Blood, UA Moderate    POCT pregnancy, urine [[id number]]  (Normal) Resulted: 06/26/22 1126    Lab Status: Final result Updated: 06/26/22 1127     EXT PREG TEST UR (Ref: Negative) negative     Control valid                 No orders to display              Procedures  Procedures         ED Course                               SBIRT 20yo+    Flowsheet Row Most Recent Value   SBIRT (25 yo +)    In order to provide better care to our patients, we are screening all of our patients for alcohol and drug use  Would it be okay to ask you these screening questions? Yes Filed at: 06/26/2022 1032   Initial Alcohol Screen: US AUDIT-C     1  How often do you have a drink containing alcohol?  1 Filed at: 2022 1032   2  How many drinks containing alcohol do you have on a typical day you are drinking? 0 Filed at: 2022 1032   3b  FEMALE Any Age, or MALE 65+: How often do you have 4 or more drinks on one occassion? 0 Filed at: 2022 1032   Audit-C Score 1 Filed at: 2022 1032   KATHI: How many times in the past year have you    Used an illegal drug or used a prescription medication for non-medical reasons? Never Filed at: 2022 1032                    MDM  Number of Diagnoses or Management Options  Hematuria: new and requires workup  Pelvic pain: new and requires workup  Diagnosis management comments: MDM  DDx including but not limited to: ectopic pregnancy, threatened , missed , incomplete , UTI, pregnancy, PID, endometriosis, fibroid, DUB, tumor, retained products of conception, polycystic disease  Plan check pregnancy test to evaluate for pregnancy  Add urinalysis for UTI, GC chlamydia for pelvic infection    ED results:   I have reviewed the patient's vital signs, nursing notes, and other relevant ancillary testing/information  I have had a detailed discussion with the patient regarding the historical points, examination findings, and any diagnostic results    Pregnancy test negative  Urinalysis remarkable for moderate blood likely secondary to vaginal bleeding  No nitrites or leukocytes to suggest UTI  GC chlamydia was pending at time of discharge    ED course:  46-year-old female presents with left-sided pelvic pain starting 1 day ago intermittent and cramping in nature  She reports she is 2 weeks late for her menstrual period but started spotting yesterday  Denies any nausea, fever, vomiting or flank pain  ED workup negative for pregnancy  Doubt tubo-ovarian abscess, ovarian torsion or ectopic pregnancy given that patient is asymptomatic and has no pain at this time  Her pain is intermittent and cramping and associated with late menses  Pregnancy test negative  Discussed pelvis pain secondary to DUB/delayed menses  No indication for further workup  Follow-up with PCP and OBGYN in 3-5 days recheck  Stable for d/c  I discussed diagnosis and treatment plan with patient at bedside  Extended discussion with patient regarding the diagnosis, pathophysiology, expectant coarse and treatment plan  Instructed to follow up with pcp and recommended specialist in 3-5 days  Reviewed reasons to return to ed  Patient verbalized understanding of diagnosis and agreement with discharge plan of care as well as understanding of reasons to return to ed  ED A&P:  1) Left sided pelvic pain - currently no pain on ED evaluation  2) dysfunctional vaginal bleeding/delayed menses - discussed possibility of intermittent torsion but currently no pain  Discussed possible fibroids vs DUB as source for symptoms  Amount and/or Complexity of Data Reviewed  Clinical lab tests: reviewed and ordered  Discussion of test results with the performing providers: yes  Review and summarize past medical records: yes    Risk of Complications, Morbidity, and/or Mortality  General comments: Disclaimers:    I have reasonably determine that electronically prescribing a controlled substance would be impractical for the patient to obtain the controlled substance prescribed by electronic prescription or would cause an untimely delay resulting in an adverse impact on the patient's medical condition        Patient was seen during the outbreak of the corona virus epidemic   Resources are limited due to the severity of patient illnesses associated with virus   Testing is also limited at this time   Discussed with patient at the time of this evaluation   Due to the fact that limited resources are available -treatment options are limited        Patient Progress  Patient progress: stable      Disposition  Final diagnoses:   Pelvic pain   Hematuria     Time reflects when diagnosis was documented in both MDM as applicable and the Disposition within this note     Time User Action Codes Description Comment    6/26/2022 12:26 PM Jacques Nathan Add [R10 2] Pelvic pain     6/26/2022 12:26 PM Jacques [place] Add [R31 9] Hematuria       ED Disposition     ED Disposition   Discharge    Condition   Stable    Date/Time   Sun Jun 26, 2022 12:26 PM    Comment   Shadi Janet Ems discharge to home/self care  Follow-up Information     Follow up With Specialties Details Why Contact Info Additional [address] [facility], DO Family Medicine Call in 2 days for further evaluation of symptoms [address] [facility] Emergency Department Emergency Medicine Go to  If symptoms worsen [address] [facility] [address] [facility] Emergency Department, [address] [place], [address] [place] [address] Tali Clark MD Obstetrics and Gynecology, Obstetrics, Gynecology Call in 3 days for further evaluation of symptoms [address]  [address] [address] [facility]       Schuyler Tolentino MD Urology Call in 1 week for further evaluation of blood in urine symptoms [address]  [facility]  [phone]             Discharge Medication List as of 6/26/2022 12:28 PM      START taking these medications    Details   ibuprofen (MOTRIN) 400 mg tablet Take 1 tablet (400 mg total) by mouth every 6 (six) hours as needed for moderate pain (pelvic pain/initial rx ) for up to 5 days, Starting Sun 6/26/2022, Until Fri 7/1/2022 at 2359, Normal         CONTINUE these medications which have NOT CHANGED    Details   escitalopram (LEXAPRO) 10 mg tablet Take 10 mg by mouth daily, Starting Wed 9/4/2019, Historical Med             No discharge procedures on file      PDMP Review     None          ED Provider  Electronically Signed by           Timmy Guillen Lisa Kenney PA-C  06/28/22 1250

## 2022-06-27 ENCOUNTER — APPOINTMENT (EMERGENCY)
Dept: CT IMAGING | Facility: HOSPITAL | Age: 42
End: 2022-06-27
Payer: COMMERCIAL

## 2022-06-27 ENCOUNTER — HOSPITAL ENCOUNTER (EMERGENCY)
Facility: HOSPITAL | Age: 42
Discharge: HOME | End: 2022-06-27
Attending: EMERGENCY MEDICINE | Admitting: EMERGENCY MEDICINE
Payer: COMMERCIAL

## 2022-06-27 ENCOUNTER — APPOINTMENT (EMERGENCY)
Dept: ULTRASOUND IMAGING | Facility: HOSPITAL | Age: 42
End: 2022-06-27
Payer: COMMERCIAL

## 2022-06-27 VITALS
OXYGEN SATURATION: 98 % | HEIGHT: 63 IN | HEART RATE: 64 BPM | RESPIRATION RATE: 20 BRPM | BODY MASS INDEX: 24.53 KG/M2 | DIASTOLIC BLOOD PRESSURE: 60 MMHG | TEMPERATURE: 98.2 F | WEIGHT: 138.45 LBS | SYSTOLIC BLOOD PRESSURE: 114 MMHG

## 2022-06-27 DIAGNOSIS — R10.32 LEFT LOWER QUADRANT PAIN: Primary | ICD-10-CM

## 2022-06-27 DIAGNOSIS — N94.89 PELVIC CONGESTION SYNDROME: ICD-10-CM

## 2022-06-27 LAB
ALBUMIN SERPL BCP-MCNC: 4 G/DL (ref 3.5–5)
ALP SERPL-CCNC: 66 U/L (ref 46–116)
ALT SERPL W P-5'-P-CCNC: 21 U/L (ref 12–78)
ANION GAP SERPL CALCULATED.3IONS-SCNC: 6 MMOL/L (ref 4–13)
APTT PPP: 27 SECONDS (ref 23–37)
AST SERPL W P-5'-P-CCNC: 13 U/L (ref 5–45)
B-HCG SERPL-ACNC: <2 MIU/ML
BACTERIA UR QL AUTO: ABNORMAL /HPF
BASOPHILS # BLD AUTO: 0.06 THOUSANDS/ΜL (ref 0–0.1)
BASOPHILS NFR BLD AUTO: 1 % (ref 0–1)
BILIRUB SERPL-MCNC: 0.63 MG/DL (ref 0.2–1)
BILIRUB UR QL STRIP: NEGATIVE
BUN SERPL-MCNC: 13 MG/DL (ref 5–25)
C TRACH DNA SPEC QL NAA+PROBE: NEGATIVE
CALCIUM SERPL-MCNC: 8.3 MG/DL (ref 8.3–10.1)
CHLORIDE SERPL-SCNC: 103 MMOL/L (ref 100–108)
CLARITY UR: ABNORMAL
CO2 SERPL-SCNC: 30 MMOL/L (ref 21–32)
COLOR UR: YELLOW
CREAT SERPL-MCNC: 0.77 MG/DL (ref 0.6–1.3)
EOSINOPHIL # BLD AUTO: 0.23 THOUSAND/ΜL (ref 0–0.61)
EOSINOPHIL NFR BLD AUTO: 3 % (ref 0–6)
ERYTHROCYTE [DISTWIDTH] IN BLOOD BY AUTOMATED COUNT: 12.2 % (ref 11.6–15.1)
GFR SERPL CREATININE-BSD FRML MDRD: 96 ML/MIN/1.73SQ M
GLUCOSE SERPL-MCNC: 98 MG/DL (ref 65–140)
GLUCOSE UR STRIP-MCNC: NEGATIVE MG/DL
HCT VFR BLD AUTO: 44.8 % (ref 34.8–46.1)
HGB BLD-MCNC: 14.9 G/DL (ref 11.5–15.4)
HGB UR QL STRIP.AUTO: ABNORMAL
IMM GRANULOCYTES # BLD AUTO: 0.02 THOUSAND/UL (ref 0–0.2)
IMM GRANULOCYTES NFR BLD AUTO: 0 % (ref 0–2)
INR PPP: 0.93 (ref 0.84–1.19)
KETONES UR STRIP-MCNC: NEGATIVE MG/DL
LACTATE SERPL-SCNC: 1.4 MMOL/L (ref 0.5–2)
LEUKOCYTE ESTERASE UR QL STRIP: ABNORMAL
LIPASE SERPL-CCNC: 161 U/L (ref 73–393)
LYMPHOCYTES # BLD AUTO: 1.62 THOUSANDS/ΜL (ref 0.6–4.47)
LYMPHOCYTES NFR BLD AUTO: 24 % (ref 14–44)
MCH RBC QN AUTO: 30.8 PG (ref 26.8–34.3)
MCHC RBC AUTO-ENTMCNC: 33.3 G/DL (ref 31.4–37.4)
MCV RBC AUTO: 93 FL (ref 82–98)
MONOCYTES # BLD AUTO: 0.51 THOUSAND/ΜL (ref 0.17–1.22)
MONOCYTES NFR BLD AUTO: 7 % (ref 4–12)
MUCOUS THREADS UR QL AUTO: ABNORMAL
N GONORRHOEA DNA SPEC QL NAA+PROBE: NEGATIVE
NEUTROPHILS # BLD AUTO: 4.42 THOUSANDS/ΜL (ref 1.85–7.62)
NEUTS SEG NFR BLD AUTO: 65 % (ref 43–75)
NITRITE UR QL STRIP: NEGATIVE
NON-SQ EPI CELLS URNS QL MICRO: ABNORMAL /HPF
NRBC BLD AUTO-RTO: 0 /100 WBCS
PH UR STRIP.AUTO: 8.5 [PH]
PLATELET # BLD AUTO: 348 THOUSANDS/UL (ref 149–390)
PMV BLD AUTO: 8.8 FL (ref 8.9–12.7)
POTASSIUM SERPL-SCNC: 3.8 MMOL/L (ref 3.5–5.3)
PROT SERPL-MCNC: 8 G/DL (ref 6.4–8.2)
PROT UR STRIP-MCNC: ABNORMAL MG/DL
PROTHROMBIN TIME: 12.4 SECONDS (ref 11.6–14.5)
RBC # BLD AUTO: 4.83 MILLION/UL (ref 3.81–5.12)
RBC #/AREA URNS AUTO: ABNORMAL /HPF
SODIUM SERPL-SCNC: 139 MMOL/L (ref 136–145)
SP GR UR STRIP.AUTO: 1.01 (ref 1–1.03)
UROBILINOGEN UR QL STRIP.AUTO: 1 E.U./DL
WBC # BLD AUTO: 6.86 THOUSAND/UL (ref 4.31–10.16)
WBC #/AREA URNS AUTO: ABNORMAL /HPF

## 2022-06-27 PROCEDURE — 87591 N.GONORRHOEAE DNA AMP PROB: CPT | Performed by: EMERGENCY MEDICINE

## 2022-06-27 PROCEDURE — 96361 HYDRATE IV INFUSION ADD-ON: CPT

## 2022-06-27 PROCEDURE — 96374 THER/PROPH/DIAG INJ IV PUSH: CPT

## 2022-06-27 PROCEDURE — 74176 CT ABD & PELVIS W/O CONTRAST: CPT

## 2022-06-27 PROCEDURE — 85610 PROTHROMBIN TIME: CPT | Performed by: EMERGENCY MEDICINE

## 2022-06-27 PROCEDURE — G1004 CDSM NDSC: HCPCS

## 2022-06-27 PROCEDURE — 76856 US EXAM PELVIC COMPLETE: CPT

## 2022-06-27 PROCEDURE — 85730 THROMBOPLASTIN TIME PARTIAL: CPT | Performed by: EMERGENCY MEDICINE

## 2022-06-27 PROCEDURE — 83690 ASSAY OF LIPASE: CPT | Performed by: EMERGENCY MEDICINE

## 2022-06-27 PROCEDURE — 99284 EMERGENCY DEPT VISIT MOD MDM: CPT | Performed by: EMERGENCY MEDICINE

## 2022-06-27 PROCEDURE — 84702 CHORIONIC GONADOTROPIN TEST: CPT | Performed by: EMERGENCY MEDICINE

## 2022-06-27 PROCEDURE — 36415 COLL VENOUS BLD VENIPUNCTURE: CPT | Performed by: EMERGENCY MEDICINE

## 2022-06-27 PROCEDURE — 76830 TRANSVAGINAL US NON-OB: CPT

## 2022-06-27 PROCEDURE — 81001 URINALYSIS AUTO W/SCOPE: CPT | Performed by: EMERGENCY MEDICINE

## 2022-06-27 PROCEDURE — 85025 COMPLETE CBC W/AUTO DIFF WBC: CPT | Performed by: EMERGENCY MEDICINE

## 2022-06-27 PROCEDURE — 87491 CHLMYD TRACH DNA AMP PROBE: CPT | Performed by: EMERGENCY MEDICINE

## 2022-06-27 PROCEDURE — 83605 ASSAY OF LACTIC ACID: CPT | Performed by: EMERGENCY MEDICINE

## 2022-06-27 PROCEDURE — 99284 EMERGENCY DEPT VISIT MOD MDM: CPT

## 2022-06-27 PROCEDURE — 80053 COMPREHEN METABOLIC PANEL: CPT | Performed by: EMERGENCY MEDICINE

## 2022-06-27 RX ORDER — KETOROLAC TROMETHAMINE 10 MG/1
10 TABLET, FILM COATED ORAL EVERY 6 HOURS PRN
Qty: 20 TABLET | Refills: 0 | Status: SHIPPED | OUTPATIENT
Start: 2022-06-27 | End: 2022-07-02

## 2022-06-27 RX ORDER — OMEPRAZOLE 40 MG/1
40 CAPSULE, DELAYED RELEASE ORAL DAILY PRN
COMMUNITY
Start: 2022-03-23

## 2022-06-27 RX ORDER — KETOROLAC TROMETHAMINE 30 MG/ML
15 INJECTION, SOLUTION INTRAMUSCULAR; INTRAVENOUS ONCE
Status: COMPLETED | OUTPATIENT
Start: 2022-06-27 | End: 2022-06-27

## 2022-06-27 RX ADMIN — KETOROLAC TROMETHAMINE 15 MG: 30 INJECTION, SOLUTION INTRAMUSCULAR at 09:43

## 2022-06-27 RX ADMIN — SODIUM CHLORIDE 1000 ML: 0.9 INJECTION, SOLUTION INTRAVENOUS at 09:43

## 2022-06-27 NOTE — ED PROVIDER NOTES
History  Chief Complaint   Patient presents with    Abdominal Pain     Pt reports sharp LLQ pain for last few days, reports start of period on Friday and turned into heavy bleeding last night      Menses is 8 days late  Started bleeding on Friday  Developed left lower quadrant pain for the last 3 days  Increased bleeding yesterday  Was seen at another facility and had a urine done which was unremarkable and urine pregnancy test was negative  Complains of increasing pain  Has been constant for 3 days but occasionally get sharp and stabbing  No radiation  No vaginal discharge other than the bleeding  No fevers or chills  No nausea vomiting or diarrhea  History provided by:  Patient   used: No    Abdominal Pain  Pain location:  LLQ  Pain quality: aching, sharp and stabbing    Pain radiates to:  Does not radiate  Pain severity:  Moderate  Onset quality:  Gradual  Duration:  3 days  Timing:  Constant  Progression:  Worsening  Chronicity:  New  Context: not awakening from sleep, not diet changes and not trauma    Relieved by:  Nothing  Worsened by:  Palpation  Ineffective treatments:  None tried  Associated symptoms: vaginal bleeding    Associated symptoms: no chest pain, no chills, no constipation, no cough, no diarrhea, no dysuria, no fatigue, no fever, no hematemesis, no hematuria, no nausea, no shortness of breath, no sore throat and no vomiting        Prior to Admission Medications   Prescriptions Last Dose Informant Patient Reported?  Taking?   escitalopram (LEXAPRO) 10 mg tablet   Yes No   Sig: Take 10 mg by mouth daily   Patient not taking: Reported on 6/26/2022   ibuprofen (MOTRIN) 400 mg tablet   No No   Sig: Take 1 tablet (400 mg total) by mouth every 6 (six) hours as needed for moderate pain (pelvic pain/initial rx ) for up to 5 days   omeprazole (PriLOSEC) 40 MG capsule   Yes Yes   Sig: Take 40 mg by mouth daily as needed      Facility-Administered Medications: None History reviewed  No pertinent past medical history  Past Surgical History:   Procedure Laterality Date    AUGMENTATION BREAST      FRACTURE SURGERY      KIDNEY STONE SURGERY         History reviewed  No pertinent family history  I have reviewed and agree with the history as documented  E-Cigarette/Vaping     E-Cigarette/Vaping Substances     Social History     Tobacco Use    Smoking status: Never Smoker    Smokeless tobacco: Never Used   Substance Use Topics    Alcohol use: Yes     Alcohol/week: 2 0 standard drinks     Types: 2 Shots of liquor per week    Drug use: No       Review of Systems   Constitutional: Negative for chills, fatigue and fever  HENT: Negative for ear pain, hearing loss, sore throat, trouble swallowing and voice change  Eyes: Negative for pain and discharge  Respiratory: Negative for cough, shortness of breath and wheezing  Cardiovascular: Negative for chest pain and palpitations  Gastrointestinal: Positive for abdominal pain  Negative for blood in stool, constipation, diarrhea, hematemesis, nausea and vomiting  Genitourinary: Positive for vaginal bleeding  Negative for dysuria, flank pain, frequency and hematuria  Musculoskeletal: Negative for joint swelling, neck pain and neck stiffness  Skin: Negative for rash and wound  Neurological: Negative for dizziness, seizures, syncope, facial asymmetry and headaches  Psychiatric/Behavioral: Negative for hallucinations, self-injury and suicidal ideas  All other systems reviewed and are negative  Physical Exam  Physical Exam  Vitals and nursing note reviewed  Constitutional:       General: She is not in acute distress  Appearance: She is well-developed  HENT:      Head: Normocephalic and atraumatic  Right Ear: External ear normal       Left Ear: External ear normal    Eyes:      General: No scleral icterus  Right eye: No discharge  Left eye: No discharge        Extraocular Movements: Extraocular movements intact  Conjunctiva/sclera: Conjunctivae normal    Cardiovascular:      Rate and Rhythm: Normal rate and regular rhythm  Heart sounds: Normal heart sounds  No murmur heard  Pulmonary:      Effort: Pulmonary effort is normal       Breath sounds: Normal breath sounds  No wheezing or rales  Abdominal:      General: Bowel sounds are normal  There is no distension  Palpations: Abdomen is soft  Tenderness: There is abdominal tenderness in the left lower quadrant  There is no guarding or rebound  Musculoskeletal:         General: No deformity  Normal range of motion  Cervical back: Normal range of motion and neck supple  Skin:     General: Skin is warm and dry  Findings: No rash  Neurological:      General: No focal deficit present  Mental Status: She is alert and oriented to person, place, and time  Cranial Nerves: No cranial nerve deficit  Psychiatric:         Mood and Affect: Mood normal          Behavior: Behavior normal          Thought Content:  Thought content normal          Judgment: Judgment normal          Vital Signs  ED Triage Vitals [06/27/22 0943]   Temperature Pulse Respirations Blood Pressure SpO2   98 2 °F (36 8 °C) 73 20 121/51 98 %      Temp Source Heart Rate Source Patient Position - Orthostatic VS BP Location FiO2 (%)   Temporal Monitor Lying Right arm --      Pain Score       6           Vitals:    06/27/22 0943 06/27/22 0945   BP: 121/51 110/52   Pulse: 73 70   Patient Position - Orthostatic VS: Lying          Visual Acuity      ED Medications  Medications   sodium chloride 0 9 % bolus 1,000 mL (0 mL Intravenous Stopped 6/27/22 1047)   ketorolac (TORADOL) injection 15 mg (15 mg Intravenous Given 6/27/22 0943)       Diagnostic Studies  Results Reviewed     Procedure Component Value Units Date/Time    Lactic acid [[id number]]  (Normal) Collected: 06/27/22 0941    Lab Status: Final result Specimen: Blood from Arm, Left Updated: 06/27/22 1023     LACTIC ACID 1 4 mmol/L     Narrative:      Result may be elevated if tourniquet was used during collection      Urine Microscopic [[id number]]  (Abnormal) Collected: 06/27/22 0942    Lab Status: Final result Specimen: Urine, Clean Catch Updated: 06/27/22 1018     RBC, UA Innumerable /hpf      WBC, UA 2-4 /hpf      Epithelial Cells Occasional /hpf      Bacteria, UA Occasional /hpf      MUCUS THREADS Occasional    Lipase [[id number]]  (Normal) Collected: 06/27/22 0941    Lab Status: Final result Specimen: Blood from Arm, Left Updated: 06/27/22 1018     Lipase 161 u/L     hCG, quantitative [[id number]]  (Normal) Collected: 06/27/22 0941    Lab Status: Final result Specimen: Blood from Arm, Left Updated: 06/27/22 1018     HCG, Quant <2 mIU/mL     Narrative:       Expected Ranges:     Approximate               Approximate HCG  Gestation age          Concentration ( mIU/mL)  _____________          ______________________   [place] [facility]                      HCG values  0 2-1                       5-50  1-2                           2-3                         100-5000  3-4                         500-71897  4-5                         1000-08360  5-6                         [id number]  6-8                         [id number]  8-12                        [id number]      Comprehensive metabolic panel [[id number]] Collected: 06/27/22 0941    Lab Status: Final result Specimen: Blood from Arm, Left Updated: 06/27/22 1008     Sodium 139 mmol/L      Potassium 3 8 mmol/L      Chloride 103 mmol/L      CO2 30 mmol/L      ANION GAP 6 mmol/L      BUN 13 mg/dL      Creatinine 0 77 mg/dL      Glucose 98 mg/dL      Calcium 8 3 mg/dL      AST 13 U/L      ALT 21 U/L      Alkaline Phosphatase 66 U/L      Total Protein 8 0 g/dL      Albumin 4 0 g/dL      Total Bilirubin 0 63 mg/dL      eGFR 96 ml/min/1 73sq m     Narrative:      [place] guidelines for Chronic Kidney Disease (CKD):    Stage 1 with normal or high GFR (GFR > 90 mL/min/1 73 square meters)    Stage 2 Mild CKD (GFR = 60-89 mL/min/1 73 square meters)    Stage 3A Moderate CKD (GFR = 45-59 mL/min/1 73 square meters)    Stage 3B Moderate CKD (GFR = 30-44 mL/min/1 73 square meters)    Stage 4 Severe CKD (GFR = 15-29 mL/min/1 73 square meters)    Stage 5 End Stage CKD (GFR <15 mL/min/1 73 square meters)  Note: GFR calculation is accurate only with a steady state creatinine    Protime-INR [[id number]]  (Normal) Collected: 06/27/22 0941    Lab Status: Final result Specimen: Blood from Arm, Left Updated: 06/27/22 1002     Protime 12 4 seconds      INR 0 93    APTT [[id number]]  (Normal) Collected: 06/27/22 0941    Lab Status: Final result Specimen: Blood from Arm, Left Updated: 06/27/22 1002     PTT 27 seconds     UA w Reflex to Microscopic w Reflex to Culture [[id number]]  (Abnormal) Collected: 06/27/22 0942    Lab Status: Final result Specimen: Urine, Clean Catch Updated: 06/27/22 0957     Color, UA Yellow     Clarity, UA Slightly Cloudy     Specific Gravity, UA 1 015     pH, UA 8 5     Leukocytes, UA Trace     Nitrite, UA Negative     Protein, UA 30 (1+) mg/dl      Glucose, UA Negative mg/dl      Ketones, UA Negative mg/dl      Urobilinogen, UA 1 0 E U /dl      Bilirubin, UA Negative     Occult Blood, UA Large    CBC and differential [[id number]]  (Abnormal) Collected: 06/27/22 0941    Lab Status: Final result Specimen: Blood from Arm, Left Updated: 06/27/22 0949     WBC 6 86 Thousand/uL      RBC 4 83 Million/uL      Hemoglobin 14 9 g/dL      Hematocrit 44 8 %      MCV 93 fL      MCH 30 8 pg      MCHC 33 3 g/dL      RDW 12 2 %      MPV 8 8 fL      Platelets 021 Thousands/uL      nRBC 0 /100 WBCs      Neutrophils Relative 65 %      Immat GRANS % 0 %      Lymphocytes Relative 24 %      Monocytes Relative 7 %      Eosinophils Relative 3 %      Basophils Relative 1 %      Neutrophils Absolute 4 42 Thousands/µL      Immature Grans Absolute 0 02 Thousand/uL      Lymphocytes Absolute 1 62 Thousands/µL      Monocytes Absolute 0 51 Thousand/µL      Eosinophils Absolute 0 23 Thousand/µL      Basophils Absolute 0 06 Thousands/µL     Chlamydia/GC amplified DNA by PCR [[id number]] Collected: 06/27/22 0942    Lab Status: In process Specimen: Urine, Other Updated: 06/27/22 0946                 US pelvis complete w transvaginal   Final Result by Shaheen Hong MD (06/27 1107)       Small uterine leiomyoma  Workstation performed: [id number]         CT abdomen pelvis wo contrast   Final Result by Doris Alva MD (06/27 1028)      No acute abnormality identified  Workstation performed: [id number]                    Procedures  Procedures         ED Course                               SBIRT 20yo+    Flowsheet Row Most Recent Value   SBIRT (25 yo +)    In order to provide better care to our patients, we are screening all of our patients for alcohol and drug use  Would it be okay to ask you these screening questions? No Filed at: 06/27/2022 0366                    MDM  Number of Diagnoses or Management Options     Amount and/or Complexity of Data Reviewed  Clinical lab tests: reviewed  Review and summarize past medical records: yes        Disposition  Final diagnoses:   Left lower quadrant pain   Pelvic congestion syndrome - Possible     Time reflects when diagnosis was documented in both MDM as applicable and the Disposition within this note     Time User Action Codes Description Comment    6/27/2022 11:21 AM Prachi Lyon Add [R10 32] Left lower quadrant pain     6/27/2022 11:21 AM Deadra Nyhan Add [N94 89] Pelvic congestion syndrome     6/27/2022 11:21 AM Deadra Nyhan Modify [S59 25] Pelvic congestion syndrome Possible      ED Disposition     ED Disposition   Discharge    Condition   Stable    Date/Time   Mon Jun 27, 2022 11:21 AM    Comment Tammi Cooks Ems discharge to home/self care                 Follow-up Information     Follow up With Specialties Details Why Contact Info    Loyd Galindo DO Family Medicine Call today  [address] [facility]  [address] [facility]  [phone]            Patient's Medications   Discharge Prescriptions    KETOROLAC (TORADOL) 10 MG TABLET    Take 1 tablet (10 mg total) by mouth every 6 (six) hours as needed for moderate pain for up to 5 days       Start Date: 6/27/2022 End Date: 7/2/2022       Order Dose: 10 mg       Quantity: 20 tablet    Refills: 0       No discharge procedures on file      PDMP Review     None          ED Provider  Electronically Signed by           Grady Stallings MD  06/27/22 2979

## 2022-06-28 LAB
C TRACH DNA SPEC QL NAA+PROBE: NEGATIVE
N GONORRHOEA DNA SPEC QL NAA+PROBE: NEGATIVE

## 2022-06-29 ENCOUNTER — TELEPHONE (OUTPATIENT)
Dept: CARDIAC SURGERY | Facility: CLINIC | Age: 42
End: 2022-06-29

## 2022-06-29 NOTE — TELEPHONE ENCOUNTER
Intake Form   Patient Details   Modesta Peck     1980     Reason For Appointment   Who is Calling? Patient   If not patient, Name? Who is the Referring Doctor? Self referral  Mom- Monica Garcia is Jaz Sheer pt   What is the diagnosis? 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 If yes, what is the date it was done? Biopsy 1/22   Biopsy done at [place] [id number]? If not, where was it done? [facility]   Was imaging done, and was it done at [facility]? If not, where was it done? CT 6/27/22   For 2nd Opinions Only: Are you currently undergoing treatment, or are you scheduled to start treatment? If yes, name of facility, city and state     For "History Of" only: Have you completed treatment? Have you had Genetic Testing done in the past?  If so, advise to bring test results to their visit no   Record Gathering Information   Did you advise to have records faxed to [phone]? no   Did you advise to bring discs to office visit? no   Scheduling Information   What is the best call back number? [phone]   What Insurance does patient have & is an insurance referral required no   If patient is NEW to [facility] [employer] a new patient packet (Titled Breast/Gyn) no   Miscellaneous Information      tested HPV positive with negative biopsy, GYN said she needs to be tested once a year  She's been having odd periods with severe pelvic pain, in the ED twice in the last week

## 2022-06-29 NOTE — TELEPHONE ENCOUNTER
Intake received  Pt has an active [employer] bc/bs plan  Insurance education outreach not needed at this time

## 2022-07-26 ENCOUNTER — OFFICE VISIT (OUTPATIENT)
Dept: GYNECOLOGIC ONCOLOGY | Facility: CLINIC | Age: 42
End: 2022-07-26
Payer: COMMERCIAL

## 2022-07-26 ENCOUNTER — APPOINTMENT (OUTPATIENT)
Dept: LAB | Facility: CLINIC | Age: 42
End: 2022-07-26
Payer: COMMERCIAL

## 2022-07-26 VITALS
HEIGHT: 63 IN | SYSTOLIC BLOOD PRESSURE: 110 MMHG | OXYGEN SATURATION: 98 % | DIASTOLIC BLOOD PRESSURE: 60 MMHG | TEMPERATURE: 97 F | BODY MASS INDEX: 24.27 KG/M2 | HEART RATE: 71 BPM | RESPIRATION RATE: 16 BRPM | WEIGHT: 137 LBS

## 2022-07-26 DIAGNOSIS — N93.8 DYSFUNCTIONAL UTERINE BLEEDING: ICD-10-CM

## 2022-07-26 DIAGNOSIS — N93.8 DYSFUNCTIONAL UTERINE BLEEDING: Primary | ICD-10-CM

## 2022-07-26 LAB — TSH SERPL DL<=0.05 MIU/L-ACNC: 1.57 UIU/ML (ref 0.45–4.5)

## 2022-07-26 PROCEDURE — 58100 BIOPSY OF UTERUS LINING: CPT | Performed by: OBSTETRICS & GYNECOLOGY

## 2022-07-26 PROCEDURE — 84403 ASSAY OF TOTAL TESTOSTERONE: CPT

## 2022-07-26 PROCEDURE — 88305 TISSUE EXAM BY PATHOLOGIST: CPT | Performed by: PATHOLOGY

## 2022-07-26 PROCEDURE — 36415 COLL VENOUS BLD VENIPUNCTURE: CPT

## 2022-07-26 PROCEDURE — 84443 ASSAY THYROID STIM HORMONE: CPT

## 2022-07-26 PROCEDURE — 99244 OFF/OP CNSLTJ NEW/EST MOD 40: CPT | Performed by: OBSTETRICS & GYNECOLOGY

## 2022-07-26 PROCEDURE — 84402 ASSAY OF FREE TESTOSTERONE: CPT

## 2022-07-26 RX ORDER — CLINDAMYCIN PHOSPHATE 10 UG/ML
LOTION TOPICAL
COMMUNITY
Start: 2022-03-23

## 2022-07-26 NOTE — PROGRESS NOTES
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 this appears to be related to only HPV carriage and no obvious dysplasia based on recent colposcopy and biopsy as well as Pap smear  The patient herself complains of hirsuitism and irregular vaginal bleeding which has been ongoing for 4 months  This is associated with significant pain as a not to get her to in the emergency department  Imaging of CT scans and pelvic ultrasounds have been essentially normal     At this point we have recommended endometrial biopsy to rule out any endometrial malignancy or pre malignancy as well as total testosterone and TSH to rule out testosterone producing tumor  The patient will follow-up in approximately 2 weeks for biopsy review  Patient can continue to use Motrin for chronic pelvic pain including 600 mg p o  Q 6 p r n  Relevant Orders    Testosterone, free, total    TSH, 3rd generation              CHIEF COMPLAINT:  Pelvic pain irregular vaginal bleeding        Patient ID: Catherine Hernandes is a 38 y o  female  Patient is a pleasant 63-year-old female seen for evaluation of abnormal pelvic ultrasound and pelvic pain  The patient has had irregular menstrual periods over the past 4 months with ongoing pelvic pain that resulted in observation in hospital for a day  She was seen in the emergency department and has had ultrasounds and CT scans for evaluation  She is using ibuprofen with minimal relief  Patient is seen by her general gyn was noted to have a recent atypical Pap smear with high-risk HPV  She underwent colposcopy which was unremarkable  This was in January of 2022      The patient was recently seen in the emergency department for left lower quadrant abdominal pain  The following images not was performed  CT scan essentially unremarkable  A follow-up pelvic ultrasound revealed the following:  FINDINGS:     UTERUS:  The uterus is anteverted in position, measuring 9 2 x 4 8 x 6 7 cm  Anterior mural leiomyoma measuring 10 x 9 x 8 mm is noted  The cervix appears within normal limits      ENDOMETRIUM:    The endometrial echo complex has an AP caliber of 7 0 mm  Its appearance is within normal limits for age and cycle and shows no filling defects        OVARIES/ADNEXA:  Right ovary:  2 1 x 2 0 x 3 3 cm  7 4 mL  No suspicious right ovarian abnormality  Doppler flow within normal limits      Left ovary:  3 3 x 1 5 x 2 1 cm  5 5 mL  No suspicious left ovarian abnormality  Doppler flow within normal limits      No suspicious adnexal mass or loculated collections  There is no free fluid      IMPRESSION:     Small uterine leiomyoma  A prior ultrasound in January of 2022 reveals a small 2 cm hemorrhagic ovarian cyst but no other significant changes  The patient is also noted onset of dark hair growth around the nipples over the past several months  She noticed that her voice is raspy year than normal   She notes no significant worsening in acne or hair loss  She also reports that her mother has a history of cervix cancer Jennifer Sage a patient that we treat here in this office  Patient feels as though something is amiss and would like further testing  She continues to have irregular bleeding and pelvic pain ongoing for the past 4 months  She has never had an endometrial biopsy  Review of Systems   Constitutional: Negative  Hirsuitism     HENT: Negative  Eyes: Negative  Respiratory: Negative  Cardiovascular: Negative  Gastrointestinal: Negative  Endocrine: Negative  Genitourinary: Positive for pelvic pain and vaginal bleeding  Musculoskeletal: Negative  Skin: Negative  Neurological: Negative  Hematological: Negative  Psychiatric/Behavioral: Negative  Current Outpatient Medications   Medication Sig Dispense Refill    clindamycin (CLEOCIN T) 1 % lotion Apply topically      ibuprofen (MOTRIN) 400 mg tablet Take 1 tablet (400 mg total) by mouth every 6 (six) hours as needed for moderate pain (pelvic pain/initial rx ) for up to 5 days 30 tablet 0    omeprazole (PriLOSEC) 40 MG capsule Take 40 mg by mouth daily as needed      escitalopram (LEXAPRO) 10 mg tablet Take 10 mg by mouth daily (Patient not taking: No sig reported)      ketorolac (TORADOL) 10 mg tablet Take 1 tablet (10 mg total) by mouth every 6 (six) hours as needed for moderate pain for up to 5 days 20 tablet 0     No current facility-administered medications for this visit  Allergies   Allergen Reactions    Vicodin [Hydrocodone-Acetaminophen] GI Intolerance       No past medical history on file  Past Surgical History:   Procedure Laterality Date    AUGMENTATION BREAST      FRACTURE SURGERY      KIDNEY STONE SURGERY         OB History    No obstetric history on file  No family history on file  The following portions of the patient's history were reviewed and updated as appropriate: allergies, current medications, past medical history, past social history, past surgical history and problem list       Objective:    Blood pressure 110/60, pulse 71, temperature (!) 97 °F (36 1 °C), temperature source Temporal, resp  rate 16, height 5' 3" (1 6 m), weight 62 1 kg (137 lb), SpO2 98 %  Body mass index is 24 27 kg/m²  Physical Exam  Constitutional:       Appearance: She is well-developed  HENT:      Head: Normocephalic and atraumatic  Eyes:      Pupils: Pupils are equal, round, and reactive to light  Cardiovascular:      Rate and Rhythm: Normal rate and regular rhythm  Heart sounds: Normal heart sounds  Pulmonary:      Effort: Pulmonary effort is normal  No respiratory distress        Breath sounds: Normal breath sounds  Chest:   Breasts:      Right: No supraclavicular adenopathy  Left: No supraclavicular adenopathy  Abdominal:      General: Bowel sounds are normal  There is no distension  Palpations: Abdomen is soft  Abdomen is not rigid  Tenderness: There is no abdominal tenderness  There is no guarding or rebound  Genitourinary:     Comments: -Normal external female genitalia, normal Bartholin's and Casey's glands                  -Normal midline urethral meatus  No lesions notes                  -Bladder without fullness mass or tenderness                  -Vagina without lesion or discharge No significant cystocele or rectocele noted                  -Cervix normal appearing without visible lesions                  -Uterus with normal contour, mobility  No tenderness,                  -Adnexae without  mass or tenderness                  - Anus without fissure of lesion    Musculoskeletal:         General: Normal range of motion  Cervical back: Normal range of motion and neck supple  Lymphadenopathy:      Cervical: No cervical adenopathy  Upper Body:      Right upper body: No supraclavicular adenopathy  Left upper body: No supraclavicular adenopathy  Skin:     General: Skin is warm and dry  Neurological:      Mental Status: She is alert and oriented to person, place, and time     Psychiatric:         Behavior: Behavior normal            No results found for:   Lab Results   Component Value Date    WBC 6 86 06/27/2022    HGB 14 9 06/27/2022    HCT 44 8 06/27/2022    MCV 93 06/27/2022     06/27/2022     Lab Results   Component Value Date     07/16/2014    K 3 8 06/27/2022     06/27/2022    CO2 30 06/27/2022    ANIONGAP 6 07/16/2014    BUN 13 06/27/2022    CREATININE 0 77 06/27/2022    GLUCOSE 80 07/16/2014    CALCIUM 8 3 06/27/2022    AST 13 06/27/2022    ALT 21 06/27/2022    ALKPHOS 66 06/27/2022    PROT 7 8 07/16/2014    BILITOT 0 7 07/16/2014    EGFR 96 06/27/2022     Endometrial biopsy    Date/Time: 7/26/2022 10:40 AM  Performed by: Vernon Lopez MD  Authorized by: Vernon Lopez MD   Universal Protocol:  Consent: Verbal consent obtained  Patient understanding: patient states understanding of the procedure being performed  Patient consent: the patient's understanding of the procedure matches consent given      Indication:     Indications: Other disorder of menstruation and other abnormal bleeding from female genital tract    Procedure:     Procedure: endometrial biopsy with Pipelle      A bivalve speculum was placed in the vagina: yes      Cervix cleaned and prepped: yes      The cervix was dilated: yes      Uterus sound depth (cm):  7 (7)    Specimen collected: specimen collected and sent to pathology    Findings:     Uterus size:  Non-gravid  Comments:     Procedure comments:  After verbal consent a urine pregnancy with test was performed which was negative  The patient then underwent a single pass of endometrial biopsy with cleansing of cervix with Betadine  The patient experience moderate pain but was able to tolerate the procedure  An adequate specimen was obtained

## 2022-07-26 NOTE — ASSESSMENT & PLAN NOTE
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 but this appears to be related to only HPV carriage and no obvious dysplasia based on recent colposcopy and biopsy as well as Pap smear  The patient herself complains of hirsuitism and irregular vaginal bleeding which has been ongoing for 4 months  This is associated with significant pain as a not to get her to in the emergency department  Imaging of CT scans and pelvic ultrasounds have been essentially normal     At this point we have recommended endometrial biopsy to rule out any endometrial malignancy or pre malignancy as well as total testosterone and TSH to rule out testosterone producing tumor  The patient will follow-up in approximately 2 weeks for biopsy review  Patient can continue to use Motrin for chronic pelvic pain including 600 mg p o  Q 6 p r n

## 2022-07-27 LAB
TESTOST FREE SERPL-MCNC: 3.6 PG/ML (ref 0–4.2)
TESTOST SERPL-MCNC: 22 NG/DL (ref 8–60)

## 2022-08-05 ENCOUNTER — PATIENT MESSAGE (OUTPATIENT)
Dept: GYNECOLOGIC ONCOLOGY | Facility: CLINIC | Age: 42
End: 2022-08-05

## 2022-08-16 ENCOUNTER — HOSPITAL ENCOUNTER (OUTPATIENT)
Dept: ULTRASOUND IMAGING | Facility: HOSPITAL | Age: 42
Discharge: HOME | End: 2022-08-16
Payer: COMMERCIAL

## 2022-08-16 DIAGNOSIS — R10.11 RIGHT UPPER QUADRANT PAIN: ICD-10-CM

## 2022-08-16 PROCEDURE — 76700 US EXAM ABDOM COMPLETE: CPT

## 2022-08-23 ENCOUNTER — HOSPITAL ENCOUNTER (OUTPATIENT)
Dept: CT IMAGING | Facility: HOSPITAL | Age: 42
Discharge: HOME | End: 2022-08-23
Payer: COMMERCIAL

## 2022-08-23 DIAGNOSIS — R10.11 RIGHT UPPER QUADRANT PAIN: ICD-10-CM

## 2022-08-23 PROCEDURE — 74176 CT ABD & PELVIS W/O CONTRAST: CPT

## 2022-08-24 ENCOUNTER — TELEMEDICINE (OUTPATIENT)
Dept: GYNECOLOGIC ONCOLOGY | Facility: CLINIC | Age: 42
End: 2022-08-24
Payer: COMMERCIAL

## 2022-08-24 DIAGNOSIS — N93.8 DYSFUNCTIONAL UTERINE BLEEDING: Primary | ICD-10-CM

## 2022-08-24 PROCEDURE — 99213 OFFICE O/P EST LOW 20 MIN: CPT | Performed by: OBSTETRICS & GYNECOLOGY

## 2022-08-24 NOTE — PROGRESS NOTES
Virtual Regular Visit    Verification of patient location:    Patient is located in the following state in which I hold an active license [place]      Assessment/Plan:    Problem List Items Addressed This Visit        Genitourinary    Dysfunctional uterine bleeding - Primary     Patient has some minor dysfunctional uterine bleeding  There appears to be no evidence of hyperplasia or polyp  Additionally the patient has some hormonal symptoms  We have discussed the possibility of abnormality in the hypo thalamic pituitary ovarian axis and have discussed the possibility of using oral contraceptive pills to normalize this for a short period of time  The patient has used oral contraceptives in the past and had no problem  She is currently a nonsmoker  She will think about this issue and is not really interested in committing at this point  With regard to region HPV testing we have recommended continued annual exam and possible colposcopy  This can be managed by her general gyn physician  Reason for visit is   Chief Complaint   Patient presents with    Virtual Regular Visit        Encounter provider Zion Donaldson MD    Provider located at [address] [place] [address]  [address]  [facility] [id number] [phone]      Recent Visits  No visits were found meeting these conditions  Showing recent visits within past 7 days and meeting all other requirements  Today's Visits  Date Type Provider Dept   08/24/22 1111 [address] [place] [address] today's visits and meeting all other requirements  Future Appointments  No visits were found meeting these conditions  Showing future appointments within next 150 days and meeting all other requirements       The patient was identified by name and date of birth   Erika Sands was informed that this is a telemedicine visit and that the visit is being conducted through [facility] and patient was informed this is a secure, HIPAA-complaint platform  She agrees to proceed     My office door was closed  No one else was in the room  She acknowledged consent and understanding of privacy and security of the video platform  The patient has agreed to participate and understands they can discontinue the visit at any time  Patient is aware this is a billable service  Subjective Verneda Hammans is a 38 y o  female for follow-up testing  And biopsy results         Patient is very pleasant 82-year-old female with a history of hirsute is Um and irregular vaginal bleeding  She also has some abdominal and pelvic pain  She underwent CT scan of the abdomen and pelvis as well as pelvic ultrasound were centrally unremarkable  She underwent Pipelle endometrial biopsy which reveals the following:  Final Diagnosis  A  Endometrium, biopsy:  -   Secretory endometrium with tubal metaplasia  -   Benign squamous epithelium   -   Negative for hyperplasia, atypia, and malignancy  Testosterone and TSH were normal     The patient continues to note some intermittent irregular bleeding and crampy pain but all in all is noting no significant change  No past medical history on file      Past Surgical History:   Procedure Laterality Date    AUGMENTATION BREAST      FRACTURE SURGERY      KIDNEY STONE SURGERY         Current Outpatient Medications   Medication Sig Dispense Refill    clindamycin (CLEOCIN T) 1 % lotion Apply topically      escitalopram (LEXAPRO) 10 mg tablet Take 10 mg by mouth daily (Patient not taking: No sig reported)      ibuprofen (MOTRIN) 400 mg tablet Take 1 tablet (400 mg total) by mouth every 6 (six) hours as needed for moderate pain (pelvic pain/initial rx ) for up to 5 days 30 tablet 0    ketorolac (TORADOL) 10 mg tablet Take 1 tablet (10 mg total) by mouth every 6 (six) hours as needed for moderate pain for up to 5 days 20 tablet 0    omeprazole (PriLOSEC) 40 MG capsule Take 40 mg by mouth daily as needed       No current facility-administered medications for this visit  Allergies   Allergen Reactions    Vicodin [Hydrocodone-Acetaminophen] GI Intolerance       Review of Systems   Constitutional: Negative  HENT: Negative  Eyes: Negative  Respiratory: Negative  Cardiovascular: Negative  Gastrointestinal: Negative  Endocrine: Negative  Genitourinary: Positive for pelvic pain  Musculoskeletal: Negative  Skin: Negative  Neurological: Negative  Hematological: Negative  Psychiatric/Behavioral: Negative  Video Exam    There were no vitals filed for this visit  It was my intent to perform this visit via video technology but the patient was not able to do a video connection so the visit was completed via audio telephone only    Physical Exam     I spent 14 minutes with patient today in which greater than 50% of the time was spent in counseling/coordination of care regarding Hisuitism, irregular vaginal bleeding, and recent HPV testing

## 2022-08-24 NOTE — ASSESSMENT & PLAN NOTE
Patient has some minor dysfunctional uterine bleeding  There appears to be no evidence of hyperplasia or polyp  Additionally the patient has some hormonal symptoms  We have discussed the possibility of abnormality in the hypo thalamic pituitary ovarian axis and have discussed the possibility of using oral contraceptive pills to normalize this for a short period of time  The patient has used oral contraceptives in the past and had no problem  She is currently a nonsmoker  She will think about this issue and is not really interested in committing at this point  With regard to region HPV testing we have recommended continued annual exam and possible colposcopy  This can be managed by her general gyn physician

## 2022-10-26 ENCOUNTER — DOCTOR'S OFFICE (OUTPATIENT)
Dept: URBAN - NONMETROPOLITAN AREA CLINIC 1 | Facility: CLINIC | Age: 42
Setting detail: OPHTHALMOLOGY
End: 2022-10-26
Payer: COMMERCIAL

## 2022-10-26 DIAGNOSIS — H52.31: ICD-10-CM

## 2022-10-26 DIAGNOSIS — H52.223: ICD-10-CM

## 2022-10-26 PROCEDURE — 92014 COMPRE OPH EXAM EST PT 1/>: CPT | Performed by: OPTOMETRIST

## 2022-10-26 PROCEDURE — 92310 CONTACT LENS FITTING OU: CPT | Performed by: OPTOMETRIST

## 2022-10-26 ASSESSMENT — REFRACTION_CURRENTRX
OS_AXIS: 089
OS_OVR_VA: 20/
OD_AXIS: 094
OS_VPRISM_DIRECTION: SV
OS_SPHERE: +0.25
OS_CYLINDER: -0.50
OD_VPRISM_DIRECTION: SV
OD_OVR_VA: 20/
OD_CYLINDER: -2.00
OD_SPHERE: +3.75

## 2022-10-26 ASSESSMENT — REFRACTION_MANIFEST
OD_AXIS: 090
OD_VA2: 20/25+2
OD_CYLINDER: -2.00
OS_VA2: 20/20
OD_SPHERE: +4.00
OS_VA1: 20/20
OS_AXIS: 085
OS_CYLINDER: -0.50
OS_SPHERE: +0.25
OS_ADD: +1.25
OD_ADD: +1.25
OD_VA1: 20/25+2

## 2022-10-26 ASSESSMENT — TONOMETRY
OS_IOP_MMHG: 15
OD_IOP_MMHG: 15

## 2022-10-26 ASSESSMENT — REFRACTION_AUTOREFRACTION
OD_CYLINDER: -2.00
OS_SPHERE: +0.25
OD_AXIS: 089
OS_CYLINDER: -0.25
OD_SPHERE: +4.25
OS_AXIS: 085

## 2022-10-26 ASSESSMENT — VISUAL ACUITY
OS_BCVA: 20/20-2
OD_BCVA: 20/20

## 2022-10-26 ASSESSMENT — CONFRONTATIONAL VISUAL FIELD TEST (CVF)
OS_FINDINGS: FULL
OD_FINDINGS: FULL

## 2022-10-26 ASSESSMENT — SPHEQUIV_DERIVED
OS_SPHEQUIV: 0
OD_SPHEQUIV: 3
OD_SPHEQUIV: 3.25
OS_SPHEQUIV: 0.125

## 2022-11-11 ENCOUNTER — DOCTOR'S OFFICE (OUTPATIENT)
Dept: URBAN - NONMETROPOLITAN AREA CLINIC 1 | Facility: CLINIC | Age: 42
Setting detail: OPHTHALMOLOGY
End: 2022-11-11

## 2022-11-11 ENCOUNTER — OPTICAL OFFICE (OUTPATIENT)
Dept: URBAN - NONMETROPOLITAN AREA CLINIC 4 | Facility: CLINIC | Age: 42
Setting detail: OPHTHALMOLOGY
End: 2022-11-11
Payer: COMMERCIAL

## 2022-11-11 DIAGNOSIS — H52.223: ICD-10-CM

## 2022-11-11 DIAGNOSIS — H52.31: ICD-10-CM

## 2022-11-11 PROCEDURE — S0500 DISPOS CONT LENS: HCPCS | Performed by: OPTOMETRIST

## 2022-11-11 PROCEDURE — CLFUP CONTACT LENS FOLLOW-UP: Performed by: OPTOMETRIST

## 2022-11-11 ASSESSMENT — REFRACTION_AUTOREFRACTION
OS_SPHERE: +0.25
OS_AXIS: 085
OD_AXIS: 089
OD_SPHERE: +4.25
OD_CYLINDER: -2.00
OS_CYLINDER: -0.25

## 2022-11-11 ASSESSMENT — REFRACTION_CURRENTRX
OD_AXIS: 094
OD_OVR_VA: 20/
OD_VPRISM_DIRECTION: SV
OS_OVR_VA: 20/
OS_SPHERE: +0.25
OS_VPRISM_DIRECTION: SV
OD_CYLINDER: -2.00
OS_AXIS: 089
OD_SPHERE: +3.75
OS_CYLINDER: -0.50

## 2022-11-11 ASSESSMENT — REFRACTION_MANIFEST
OD_VA2: 20/25+2
OS_CYLINDER: -0.50
OS_VA1: 20/20
OS_VA2: 20/20
OS_AXIS: 085
OD_ADD: +1.25
OD_SPHERE: +4.00
OD_CYLINDER: -2.00
OD_AXIS: 090
OD_VA1: 20/25+2
OS_SPHERE: +0.25
OS_ADD: +1.25

## 2022-11-11 ASSESSMENT — VISUAL ACUITY
OS_BCVA: 20/50+2
OD_BCVA: 20/20

## 2022-11-11 ASSESSMENT — SPHEQUIV_DERIVED
OS_SPHEQUIV: 0
OS_SPHEQUIV: 0.125
OD_SPHEQUIV: 3
OD_SPHEQUIV: 3.25

## 2022-11-11 ASSESSMENT — CONFRONTATIONAL VISUAL FIELD TEST (CVF)
OS_FINDINGS: FULL
OD_FINDINGS: FULL

## 2022-11-18 ENCOUNTER — OFFICE VISIT (OUTPATIENT)
Dept: FAMILY MEDICINE CLINIC | Facility: CLINIC | Age: 42
End: 2022-11-18

## 2022-11-18 VITALS
HEART RATE: 72 BPM | OXYGEN SATURATION: 99 % | SYSTOLIC BLOOD PRESSURE: 102 MMHG | BODY MASS INDEX: 24.24 KG/M2 | TEMPERATURE: 98.9 F | WEIGHT: 136.8 LBS | HEIGHT: 63 IN | DIASTOLIC BLOOD PRESSURE: 56 MMHG | RESPIRATION RATE: 18 BRPM

## 2022-11-18 DIAGNOSIS — Z13.1 SCREENING FOR DIABETES MELLITUS: ICD-10-CM

## 2022-11-18 DIAGNOSIS — Z13.220 SCREENING, LIPID: ICD-10-CM

## 2022-11-18 DIAGNOSIS — Z00.00 ANNUAL PHYSICAL EXAM: Primary | ICD-10-CM

## 2022-11-18 DIAGNOSIS — Z13.0 SCREENING, ANEMIA, DEFICIENCY, IRON: ICD-10-CM

## 2022-11-18 NOTE — PROGRESS NOTES
[id number]    NAME: Guido Dos Santos  AGE: 38 y o  SEX: female  : 1980     DATE: 2022     Assessment and Plan:     Problem List Items Addressed This Visit    None  Visit Diagnoses     Annual physical exam    -  Primary    Relevant Orders    Lipid Panel with Direct LDL reflex    Comprehensive metabolic panel    CBC and differential    Screening, lipid        Relevant Orders    Lipid Panel with Direct LDL reflex    Screening for diabetes mellitus        Relevant Orders    Comprehensive metabolic panel    Screening, anemia, deficiency, iron        Relevant Orders    CBC and differential        Patient is overall healthy  We will do baseline lab work  She has gynecologist and is otherwise utd  Will work to get records and update Care Gaps  Immunizations and preventive care screenings were discussed with patient today  Appropriate education was printed on patient's after visit summary  Declined today, will offer in f/u     Counseling:  Alcohol/drug use: discussed moderation in alcohol intake, the recommendations for healthy alcohol use, and avoidance of illicit drug use  Dental Health: discussed importance of regular tooth brushing, flossing, and dental visits  Injury prevention: discussed safety/seat belts, safety helmets, smoke detectors, carbon dioxide detectors, and smoking near bedding or upholstery  Sexual health: discussed sexually transmitted diseases, partner selection, use of condoms, avoidance of unintended pregnancy, and contraceptive alternatives  · Exercise: the importance of regular exercise/physical activity was discussed  Recommend exercise 3-5 times per week for at least 30 minutes  BMI Counseling: Body mass index is 24 23 kg/m²   The BMI is above normal  Nutrition recommendations include decreasing portion sizes, encouraging healthy choices of fruits and vegetables, decreasing fast food intake, consuming healthier snacks, limiting drinks that contain sugar, reducing intake of saturated and trans fat and reducing intake of cholesterol  Exercise recommendations include exercising 3-5 times per week and strength training exercises  Rationale for BMI follow-up plan is due to patient being overweight or obese  Depression Screening and Follow-up Plan: Patient was screened for depression during today's encounter  They screened negative with a PHQ-2 score of 2  Patient/Caretaker verbalized understanding and were in agreement with today's assessment and plan  Time was taken to address any questions patient/caretaker had  Return in about 1 year (around 2023) for Annual physical      Chief Complaint:     Chief Complaint   Patient presents with   • New Patient Visit      History of Present Illness:     Adult Annual Physical   Patient here for a comprehensive physical exam  The patient reports:    She sees gyn --- seen  [facility]  Had Mammo in      She has 2 daughters -- 15 and 10 -- they go to [facility] and they live in [place] - her daughters and her stepson (5)  Employment -  [employer]  - works days  Diet and Physical Activity  · Diet/Nutrition: could be better  · Exercise: no formal exercise   She is very busy with work and also helping to care for her dad in [facility]      Depression Screening  PHQ-2/9 Depression Screening    Little interest or pleasure in doing things: 1 - several days  Feeling down, depressed, or hopeless: 1 - several days  Trouble falling or staying asleep, or sleeping too much: 0 - not at all  Feeling tired or having little energy: 1 - several days  Poor appetite or overeatin - not at all  Feeling bad about yourself - or that you are a failure or have let yourself or your family down: 0 - not at all  Trouble concentrating on things, such as reading the newspaper or watching television: 0 - not at all  Moving or speaking so slowly that other people could have noticed  Or the opposite - being so fidgety or restless that you have been moving around a lot more than usual: 0 - not at all  Thoughts that you would be better off dead, or of hurting yourself in some way: 0 - not at all  PHQ-2 Score: 2  PHQ-2 Interpretation: Negative depression screen  PHQ-9 Score: 3   PHQ-9 Interpretation: No or Minimal depression        General Health  · Sleep: sleeps well  · Hearing: no issues  · Vision: no vision problems  · Dental: cares for her teeth at home  /GYN Health  · Patient is: premenopausal  · Last menstrual period: in october  · Contraceptive method: btl  Review of Systems:     Review of Systems   All other systems reviewed and are negative  Past Medical History:     History reviewed  No pertinent past medical history  Past Surgical History:     Past Surgical History:   Procedure Laterality Date   • AUGMENTATION BREAST     • BUNIONECTOMY Right     around 2020   • CARPAL TUNNEL RELEASE Bilateral     july 12th, 2022 - L; R 10/16/22 - R   • FRACTURE SURGERY      1990 R tib/fib   • KIDNEY STONE SURGERY     • TUBAL LIGATION      2018   • WISDOM TOOTH EXTRACTION Bilateral       Social History:     Social History     Socioeconomic History   • Marital status: /Civil Union     Spouse name: None   • Number of children: None   • Years of education: None   • Highest education level: None   Occupational History   • None   Tobacco Use   • Smoking status: Never   • Smokeless tobacco: Never   Vaping Use   • Vaping Use: Never used   Substance and Sexual Activity   • Alcohol use:  Yes     Alcohol/week: 2 0 standard drinks     Types: 2 Shots of liquor per week     Comment: occasion   • Drug use: No   • Sexual activity: Yes     Partners: Male     Comment: she is    Other Topics Concern   • None   Social History Narrative   • None     Social Determinants of Health     Financial Resource Strain: Not on file   Food Insecurity: Not on file   Transportation Needs: Not on file   Physical Activity: Not on file   Stress: Not on file   Social Connections: Not on file   Intimate Partner Violence: Not on file   Housing Stability: Not on file      Family History:     Family History   Problem Relation Age of Onset   • Hypertension Mother    • Cervical cancer Mother    • Hyperlipidemia Mother    • Diabetes Father    • Kidney failure Father         2/2 Diab   • COPD Father         emphysema   • Parkinsonism Father    • Thyroid disease Father         thyroidectomy - not sure why   • Hypertension Father    • Hyperlipidemia Father       Current Medications:     No current outpatient medications on file  No current facility-administered medications for this visit  Allergies: Allergies   Allergen Reactions   • Vicodin [Hydrocodone-Acetaminophen] GI Intolerance      Physical Exam:     /56 (BP Location: Left arm, Patient Position: Sitting, Cuff Size: Large)   Pulse 72   Temp 98 9 °F (37 2 °C) (Temporal)   Resp 18   Ht 5' 3" (1 6 m)   Wt 62 1 kg (136 lb 12 8 oz)   LMP 10/19/2022 (Exact Date) Comment: Irregular  SpO2 99%   BMI 24 23 kg/m²     Physical Exam  Vitals and nursing note reviewed  Constitutional:       Appearance: Normal appearance  HENT:      Head: Normocephalic and atraumatic  Right Ear: Tympanic membrane and ear canal normal       Left Ear: Tympanic membrane and ear canal normal       Nose: Nose normal       Mouth/Throat:      Mouth: Mucous membranes are moist       Pharynx: Oropharynx is clear  Eyes:      Conjunctiva/sclera: Conjunctivae normal       Pupils: Pupils are equal, round, and reactive to light  Cardiovascular:      Rate and Rhythm: Normal rate and regular rhythm  Heart sounds: Normal heart sounds  Pulmonary:      Effort: Pulmonary effort is normal       Breath sounds: No wheezing, rhonchi or rales     Abdominal:      General: Bowel sounds are normal       Palpations: Abdomen is soft  Musculoskeletal:         General: No deformity  Cervical back: Neck supple  Lymphadenopathy:      Cervical: No cervical adenopathy  Skin:     General: Skin is warm and dry  Neurological:      General: No focal deficit present  Mental Status: She is alert and oriented to person, place, and time  Psychiatric:         Mood and Affect: Mood normal          Behavior: Behavior normal          Thought Content:  Thought content normal          Judgment: Judgment normal           DO Bradley Jose

## 2022-11-18 NOTE — PATIENT INSTRUCTIONS

## 2022-11-21 ENCOUNTER — TELEPHONE (OUTPATIENT)
Dept: ADMINISTRATIVE | Facility: OTHER | Age: 42
End: 2022-11-21

## 2022-11-21 ENCOUNTER — APPOINTMENT (OUTPATIENT)
Dept: LAB | Facility: HOSPITAL | Age: 42
End: 2022-11-21

## 2022-11-21 DIAGNOSIS — Z00.00 ANNUAL PHYSICAL EXAM: ICD-10-CM

## 2022-11-21 DIAGNOSIS — Z13.1 SCREENING FOR DIABETES MELLITUS: ICD-10-CM

## 2022-11-21 DIAGNOSIS — Z13.220 SCREENING, LIPID: ICD-10-CM

## 2022-11-21 DIAGNOSIS — Z13.0 SCREENING, ANEMIA, DEFICIENCY, IRON: ICD-10-CM

## 2022-11-21 LAB
ALBUMIN SERPL BCP-MCNC: 3.6 G/DL (ref 3.5–5)
ALP SERPL-CCNC: 69 U/L (ref 46–116)
ALT SERPL W P-5'-P-CCNC: 19 U/L (ref 12–78)
ANION GAP SERPL CALCULATED.3IONS-SCNC: 3 MMOL/L (ref 4–13)
AST SERPL W P-5'-P-CCNC: 13 U/L (ref 5–45)
BASOPHILS # BLD AUTO: 0.07 THOUSANDS/ÂΜL (ref 0–0.1)
BASOPHILS NFR BLD AUTO: 1 % (ref 0–1)
BILIRUB SERPL-MCNC: 0.58 MG/DL (ref 0.2–1)
BUN SERPL-MCNC: 17 MG/DL (ref 5–25)
CALCIUM SERPL-MCNC: 9 MG/DL (ref 8.3–10.1)
CHLORIDE SERPL-SCNC: 110 MMOL/L (ref 96–108)
CHOLEST SERPL-MCNC: 176 MG/DL
CO2 SERPL-SCNC: 26 MMOL/L (ref 21–32)
CREAT SERPL-MCNC: 0.77 MG/DL (ref 0.6–1.3)
EOSINOPHIL # BLD AUTO: 0.24 THOUSAND/ÂΜL (ref 0–0.61)
EOSINOPHIL NFR BLD AUTO: 4 % (ref 0–6)
ERYTHROCYTE [DISTWIDTH] IN BLOOD BY AUTOMATED COUNT: 12.3 % (ref 11.6–15.1)
GFR SERPL CREATININE-BSD FRML MDRD: 96 ML/MIN/1.73SQ M
GLUCOSE P FAST SERPL-MCNC: 102 MG/DL (ref 65–99)
HCT VFR BLD AUTO: 42.1 % (ref 34.8–46.1)
HDLC SERPL-MCNC: 43 MG/DL
HGB BLD-MCNC: 13.8 G/DL (ref 11.5–15.4)
IMM GRANULOCYTES # BLD AUTO: 0.02 THOUSAND/UL (ref 0–0.2)
IMM GRANULOCYTES NFR BLD AUTO: 0 % (ref 0–2)
LDLC SERPL CALC-MCNC: 102 MG/DL (ref 0–100)
LYMPHOCYTES # BLD AUTO: 1.87 THOUSANDS/ÂΜL (ref 0.6–4.47)
LYMPHOCYTES NFR BLD AUTO: 29 % (ref 14–44)
MCH RBC QN AUTO: 30.1 PG (ref 26.8–34.3)
MCHC RBC AUTO-ENTMCNC: 32.8 G/DL (ref 31.4–37.4)
MCV RBC AUTO: 92 FL (ref 82–98)
MONOCYTES # BLD AUTO: 0.53 THOUSAND/ÂΜL (ref 0.17–1.22)
MONOCYTES NFR BLD AUTO: 8 % (ref 4–12)
NEUTROPHILS # BLD AUTO: 3.62 THOUSANDS/ÂΜL (ref 1.85–7.62)
NEUTS SEG NFR BLD AUTO: 58 % (ref 43–75)
NRBC BLD AUTO-RTO: 0 /100 WBCS
PLATELET # BLD AUTO: 333 THOUSANDS/UL (ref 149–390)
PMV BLD AUTO: 9.4 FL (ref 8.9–12.7)
POTASSIUM SERPL-SCNC: 4.3 MMOL/L (ref 3.5–5.3)
PROT SERPL-MCNC: 7.7 G/DL (ref 6.4–8.4)
RBC # BLD AUTO: 4.59 MILLION/UL (ref 3.81–5.12)
SODIUM SERPL-SCNC: 139 MMOL/L (ref 135–147)
TRIGL SERPL-MCNC: 153 MG/DL
WBC # BLD AUTO: 6.35 THOUSAND/UL (ref 4.31–10.16)

## 2022-11-21 NOTE — TELEPHONE ENCOUNTER
----- Message from Hannah Riley sent at 11/21/2022  7:55 AM EST -----  11/21/22 7:55 AM    Hello, our patient No patient name on file  has had Pap Smear (HPV) aka Cervical Cancer Screening completed/performed  Please assist in updating the patient chart by pulling the Care Everywhere (CE) document  The date of service is 12/2021       Thank you,  Hannah LANDEROS

## 2022-11-21 NOTE — TELEPHONE ENCOUNTER
Upon review of the In Basket request we were able to locate, review, and update the patient chart as requested for Pap Smear (HPV) aka Cervical Cancer Screening  Any additional questions or concerns should be emailed to the Practice Liaisons via the appropriate education email address, please do not reply via In Basket      Thank you  Luda Sylvester

## 2022-11-21 NOTE — TELEPHONE ENCOUNTER
Upon review of the In Basket request and the patient's chart, initial outreach has been made via telephone call to facility  , please see Contacts section for details       Thank you  Josse Linton

## 2023-02-12 ENCOUNTER — OFFICE VISIT (OUTPATIENT)
Dept: URGENT CARE | Facility: CLINIC | Age: 43
End: 2023-02-12

## 2023-02-12 VITALS
RESPIRATION RATE: 18 BRPM | DIASTOLIC BLOOD PRESSURE: 72 MMHG | BODY MASS INDEX: 23.57 KG/M2 | WEIGHT: 133 LBS | HEART RATE: 77 BPM | HEIGHT: 63 IN | TEMPERATURE: 97.4 F | OXYGEN SATURATION: 98 % | SYSTOLIC BLOOD PRESSURE: 116 MMHG

## 2023-02-12 DIAGNOSIS — M65.4 TENOSYNOVITIS, DE QUERVAIN: Primary | ICD-10-CM

## 2023-02-12 NOTE — PROGRESS NOTES
[facility]        NAME: Carlos Cleaning is a 38 y o  female  : 1980    MRN: [id number]  DATE: 2023  TIME: 1:26 PM    Assessment and Plan   Tenosynovitis, de Quervain [M65 [id number]]  1  Tenosynovitis, de Wayzata Magnolia  Ambulatory Referral to Physical Therapy    Orthopedic injury treatment        Orthopedic injury treatment    Date/Time: 2023 1:18 PM  Performed by: LEOBARDO Lopez  Authorized by: Manny Hernandez DO     Patient Location:  [facility], [place] Protocol:  Procedure performed by: Niya Franco RN)  Consent: Verbal consent obtained  Risks and benefits: risks, benefits and alternatives were discussed  Consent given by: patient  Patient understanding: patient states understanding of the procedure being performed  Patient identity confirmed: verbally with patient      Injury location:  Wrist  Location details:  Right wrist  Injury type: Soft tissue  Neurovascular status: Neurovascularly intact    Distal perfusion: normal    Neurological function: normal    Range of motion: normal    Immobilization:  Brace (thumb spica right wrist brace)  Neurovascular status: Neurovascularly intact    Distal perfusion: normal    Neurological function: normal    Range of motion: unchanged    Patient tolerance:  Patient tolerated the procedure well with no immediate complications      Findings and history concerning for de Quervain's tenosynovitis  Right thumb spica brace applied by nursing staff for support  OTC Tylenol or Ibuprofen as needed for pain  Ambulatory referral placed to PT  Follow up with PCP in 3 to 5 days or proceed to emergency department if symptoms worsen  Patient verbalized understanding of instructions given  Patient Instructions     Patient Instructions   Wear brace for support/compression  OTC Tylenol or Ibuprofen as needed for pain  Ice  Referral placed to PT   Follow up with PCP in 3-5 days  Proceed to  ER if symptoms worsen      Jose Martin Bullocks Disease   WHAT YOU NEED TO KNOW: What is de Quervain's disease? De Quervain's disease is inflammation of the tendons on the thumb side of your wrist  Tendons are thick strands of tissue that connect muscles to bones  What causes de Quervain's disease? Winford Rashmi disease is usually caused by frequent, repeated movements of your thumb or wrist  For example, lifting a small child, sewing, typing, or playing the piano can cause inflammation  A direct blow to the thumb may also damage the tendon and form scar tissue  This scar tissue can keep the tendon from working properly  What are the signs and symptoms of de Quervain's disease? · Pain and swelling near the base of your thumb are the most common symptoms  This usually occurs when you move your wrist up and down, grasp an object, or make a fist     · You will hear a grating noise when you move or rub your thumb or wrist     · Your thumb and wrist may be weak and you may have limited movement  How is de Quervain's disease diagnosed? Your healthcare provider will ask about your medical history and examine you  He will ask you to make a fist with your thumb touching the palm of your hand  Then he will ask you to move your hand and wrist in certain directions  He will check to see if you have pain, weakness, or problems with movement  Both hands may need to be checked  You may also need any of the following:  · X-rays: You may need pictures of your wrist and hand to check for a fracture  X-rays of both hands and wrists may be done  · MRI:  This scan uses powerful magnets and a computer to take pictures of your wrist and hand  An MRI may show if you have de Quervain's disease  You may be given dye to help the pictures show up better  Tell healthcare providers if you are allergic to iodine or seafood  You may also be allergic to the dye  Do not enter the MRI room with anything metal  Metal can cause serious injury   Tell healthcare providers if you have any metal in or on your body     How is de Quervain's disease treated? 1  Medicines:      ? NSAIDs:  These medicines decrease swelling, pain, and fever  They are available without a doctor's order  Ask which medicine is right for you, and how much to take  Take as directed  NSAIDs can cause stomach bleeding or kidney problems if not taken correctly  ? Steroid injection: This decreases long-term pain and swelling  It is usually injected into your wrist or hand  2  Surgery: This may be done if other treatments do not work or your pain interferes with your daily activities  During surgery, an incision is made in the tissue that covers the tendon  This helps release pressure and reduce pain so your tendon can move freely  How can I manage my symptoms? · Splint or brace: These devices will help decrease pain, limit movement, and protect your wrist so that it can heal  Make sure your device is comfortable  If it is too tight, your fingers may feel numb or tingly  Do not push or lean on your device because it can break  · Physical or occupational therapy:  You may need to see a physical or occupational therapist to teach you special exercises  These exercises help improve movement and decrease pain  They also help improve strength and decrease your risk for loss of function  Therapists will help you make changes to your daily activities to decrease stress and pressure on the tendons  · Rest:  Rest your injured thumb or wrist  Avoid twisting, grasping, or gripping movements  Ask when you can return to your normal activities  What are the risks of de Quervain's disease? · Your thumb or wrist may not move the way it did before, even after treatment  It may take time and commitment to therapy to regain strength and normal movement of your thumb and wrist      · Without treatment, you may have increased pain and swelling  Over time, your movement and function will decrease   Eventually, you may not be able to move or use your thumb or wrist     When should I contact my healthcare provider? · Your splint or brace is too tight and you cannot loosen it  · You have a fever  · Your pain and swelling get worse or do not go away  · Your cannot grasp objects because of the pain and swelling  · You have questions or concerns about your condition or care  When should I seek immediate care? · You cannot move your thumb or wrist     · Your fingers feel numb, tingly, cool to the touch, or look blue or pale  CARE AGREEMENT:   You have the right to help plan your care  Learn about your health condition and how it may be treated  Discuss treatment options with your healthcare providers to decide what care you want to receive  You always have the right to refuse treatment  The above information is an  only  It is not intended as medical advice for individual conditions or treatments  Talk to your doctor, nurse or pharmacist before following any medical regimen to see if it is safe and effective for you  © Copyright [employer] 2022 Information is for End User's use only and may not be sold, redistributed or otherwise used for commercial purposes  All illustrations and images included in CareNotes® are the copyrighted property of A D A M , Inc  or [address] [place] [address]          Chief Complaint     Chief Complaint   Patient presents with   • Hand Pain     C/o right hand pain that starts in thumb and shoots into wrist, pain started x3 days ago  History of Present Illness       72-year-old female with a past medical history significant for bilateral carpal tunnel with release presents with complaints of right thumb and wrist pain x3 days  She denies any known specific injury or trauma however states that she noticed pain at the base of her right thumb that radiates into her right wrist and increases with movement  Has been taking OTC Ibuprofen for pain  Denies bruising or swelling   Denies numbness/tingling  Reports job duties including typing on a keyboard  Review of Systems   Review of Systems   Constitutional: Negative for chills and fever  Respiratory: Negative for shortness of breath  Cardiovascular: Negative for chest pain  Gastrointestinal: Negative for diarrhea, nausea and vomiting  Musculoskeletal: Positive for arthralgias  Negative for joint swelling  Skin: Negative for color change  Neurological: Negative for weakness and numbness  Current Medications     No current outpatient medications on file  Current Allergies     Allergies as of 02/12/2023 - Reviewed 02/12/2023   Allergen Reaction Noted   • Vicodin [hydrocodone-acetaminophen] GI Intolerance 09/10/2017            The following portions of the patient's history were reviewed and updated as appropriate: allergies, current medications, past family history, past medical history, past social history, past surgical history and problem list      Past Medical History:   Diagnosis Date   • No known health problems        Past Surgical History:   Procedure Laterality Date   • AUGMENTATION BREAST     • BUNIONECTOMY Right     around 2020   • CARPAL TUNNEL RELEASE Bilateral     july 12th, 2022 - L; R 10/16/22 - R   • FRACTURE SURGERY      1990 R tib/fib   • KIDNEY STONE SURGERY     • TUBAL LIGATION      2018   • WISDOM TOOTH EXTRACTION Bilateral        Family History   Problem Relation Age of Onset   • Hypertension Mother    • Cervical cancer Mother    • Hyperlipidemia Mother    • Diabetes Father    • Kidney failure Father         2/2 Diab   • COPD Father         emphysema   • Parkinsonism Father    • Thyroid disease Father         thyroidectomy - not sure why   • Hypertension Father    • Hyperlipidemia Father          Medications have been verified          Objective   /72   Pulse 77   Temp (!) 97 4 °F (36 3 °C)   Resp 18   Ht 5' 3" (1 6 m)   Wt 60 3 kg (133 lb)   SpO2 98%   BMI 23 56 kg/m²   No LMP recorded  Patient is premenopausal        Physical Exam     Physical Exam  Vitals and nursing note reviewed  Constitutional:       General: She is not in acute distress  Appearance: She is not toxic-appearing  HENT:      Head: Normocephalic  Nose: Nose normal       Mouth/Throat:      Mouth: Mucous membranes are moist       Pharynx: Oropharynx is clear  Eyes:      Conjunctiva/sclera: Conjunctivae normal    Cardiovascular:      Pulses: Normal pulses  Pulmonary:      Effort: Pulmonary effort is normal    Musculoskeletal:         General: Tenderness present  No swelling  Right forearm: Normal       Right wrist: Normal  Normal range of motion  Normal pulse  Right hand: Bony tenderness present  No swelling  Normal range of motion  Normal sensation  Comments: Tenderness to palpation diffusely over right thumb near MCP joint that extends downward to right wrist (radial aspect)  + Finkelstein test  Full ROM  NV intact  Skin:     General: Skin is warm and dry  Neurological:      Mental Status: She is alert and oriented to person, place, and time  Sensory: Sensation is intact  Motor: Motor function is intact  Gait: Gait is intact     Psychiatric:         Mood and Affect: Mood normal          Behavior: Behavior normal

## 2023-02-12 NOTE — PATIENT INSTRUCTIONS
Wear brace for support/compression  OTC Tylenol or Ibuprofen as needed for pain  Ice  Referral placed to PT   Follow up with PCP in 3-5 days  Proceed to  ER if symptoms worsen  Jennifer Daft Disease   WHAT YOU NEED TO KNOW:   What is de Quervain's disease? De Quervain's disease is inflammation of the tendons on the thumb side of your wrist  Tendons are thick strands of tissue that connect muscles to bones  What causes de Quervain's disease? [facility] disease is usually caused by frequent, repeated movements of your thumb or wrist  For example, lifting a small child, sewing, typing, or playing the piano can cause inflammation  A direct blow to the thumb may also damage the tendon and form scar tissue  This scar tissue can keep the tendon from working properly  What are the signs and symptoms of de Quervain's disease? Pain and swelling near the base of your thumb are the most common symptoms  This usually occurs when you move your wrist up and down, grasp an object, or make a fist     You will hear a grating noise when you move or rub your thumb or wrist     Your thumb and wrist may be weak and you may have limited movement  How is de Quervain's disease diagnosed? Your healthcare provider will ask about your medical history and examine you  He will ask you to make a fist with your thumb touching the palm of your hand  Then he will ask you to move your hand and wrist in certain directions  He will check to see if you have pain, weakness, or problems with movement  Both hands may need to be checked  You may also need any of the following:  X-rays: You may need pictures of your wrist and hand to check for a fracture  X-rays of both hands and wrists may be done  MRI:  This scan uses powerful magnets and a computer to take pictures of your wrist and hand  An MRI may show if you have de Quervain's disease  You may be given dye to help the pictures show up better   Tell healthcare providers if you are allergic to iodine or seafood  You may also be allergic to the dye  Do not enter the MRI room with anything metal  Metal can cause serious injury  Tell healthcare providers if you have any metal in or on your body  How is de Quervain's disease treated? Medicines:      NSAIDs:  These medicines decrease swelling, pain, and fever  They are available without a doctor's order  Ask which medicine is right for you, and how much to take  Take as directed  NSAIDs can cause stomach bleeding or kidney problems if not taken correctly  Steroid injection: This decreases long-term pain and swelling  It is usually injected into your wrist or hand  Surgery: This may be done if other treatments do not work or your pain interferes with your daily activities  During surgery, an incision is made in the tissue that covers the tendon  This helps release pressure and reduce pain so your tendon can move freely  How can I manage my symptoms? Splint or brace: These devices will help decrease pain, limit movement, and protect your wrist so that it can heal  Make sure your device is comfortable  If it is too tight, your fingers may feel numb or tingly  Do not push or lean on your device because it can break  Physical or occupational therapy:  You may need to see a physical or occupational therapist to teach you special exercises  These exercises help improve movement and decrease pain  They also help improve strength and decrease your risk for loss of function  Therapists will help you make changes to your daily activities to decrease stress and pressure on the tendons  Rest:  Rest your injured thumb or wrist  Avoid twisting, grasping, or gripping movements  Ask when you can return to your normal activities  What are the risks of de Quervain's disease? Your thumb or wrist may not move the way it did before, even after treatment   It may take time and commitment to therapy to regain strength and normal movement of your thumb and wrist      Without treatment, you may have increased pain and swelling  Over time, your movement and function will decrease  Eventually, you may not be able to move or use your thumb or wrist     When should I contact my healthcare provider? Your splint or brace is too tight and you cannot loosen it  You have a fever  Your pain and swelling get worse or do not go away  Your cannot grasp objects because of the pain and swelling  You have questions or concerns about your condition or care  When should I seek immediate care? You cannot move your thumb or wrist     Your fingers feel numb, tingly, cool to the touch, or look blue or pale  CARE AGREEMENT:   You have the right to help plan your care  Learn about your health condition and how it may be treated  Discuss treatment options with your healthcare providers to decide what care you want to receive  You always have the right to refuse treatment  The above information is an  only  It is not intended as medical advice for individual conditions or treatments  Talk to your doctor, nurse or pharmacist before following any medical regimen to see if it is safe and effective for you  © Copyright [employer] 2022 Information is for End User's use only and may not be sold, redistributed or otherwise used for commercial purposes   All illustrations and images included in CareNotes® are the copyrighted property of A [employer] A M , Inc  or [address] [place] [address] [employer]

## 2023-03-29 ENCOUNTER — HOSPITAL ENCOUNTER (OUTPATIENT)
Dept: RADIOLOGY | Facility: HOSPITAL | Age: 43
Discharge: HOME | End: 2023-03-29

## 2023-03-29 DIAGNOSIS — T14.8XXA FRACTURE: ICD-10-CM

## 2023-08-08 ENCOUNTER — APPOINTMENT (EMERGENCY)
Dept: RADIOLOGY | Facility: HOSPITAL | Age: 43
End: 2023-08-08
Payer: COMMERCIAL

## 2023-08-08 ENCOUNTER — HOSPITAL ENCOUNTER (EMERGENCY)
Facility: HOSPITAL | Age: 43
Discharge: HOME | End: 2023-08-08
Attending: EMERGENCY MEDICINE | Admitting: EMERGENCY MEDICINE
Payer: COMMERCIAL

## 2023-08-08 ENCOUNTER — TELEPHONE (OUTPATIENT)
Dept: FAMILY MEDICINE CLINIC | Facility: CLINIC | Age: 43
End: 2023-08-08

## 2023-08-08 ENCOUNTER — APPOINTMENT (EMERGENCY)
Dept: CT IMAGING | Facility: HOSPITAL | Age: 43
End: 2023-08-08
Payer: COMMERCIAL

## 2023-08-08 ENCOUNTER — APPOINTMENT (EMERGENCY)
Dept: ULTRASOUND IMAGING | Facility: HOSPITAL | Age: 43
End: 2023-08-08
Payer: COMMERCIAL

## 2023-08-08 VITALS
DIASTOLIC BLOOD PRESSURE: 62 MMHG | RESPIRATION RATE: 18 BRPM | BODY MASS INDEX: 23.03 KG/M2 | WEIGHT: 138.4 LBS | OXYGEN SATURATION: 96 % | TEMPERATURE: 97 F | SYSTOLIC BLOOD PRESSURE: 112 MMHG | HEART RATE: 71 BPM

## 2023-08-08 DIAGNOSIS — R07.89 ATYPICAL CHEST PAIN: ICD-10-CM

## 2023-08-08 DIAGNOSIS — R79.89 ABNORMAL TSH: Primary | ICD-10-CM

## 2023-08-08 DIAGNOSIS — R10.11 RIGHT UPPER QUADRANT PAIN: Primary | ICD-10-CM

## 2023-08-08 LAB
ALBUMIN SERPL BCP-MCNC: 4.9 G/DL (ref 3.5–5)
ALP SERPL-CCNC: 58 U/L (ref 34–104)
ALT SERPL W P-5'-P-CCNC: 17 U/L (ref 7–52)
ANION GAP SERPL CALCULATED.3IONS-SCNC: 8 MMOL/L
APTT PPP: 25 SECONDS (ref 23–37)
AST SERPL W P-5'-P-CCNC: 15 U/L (ref 13–39)
BASOPHILS # BLD AUTO: 0.07 THOUSANDS/ÂΜL (ref 0–0.1)
BASOPHILS NFR BLD AUTO: 1 % (ref 0–1)
BILIRUB SERPL-MCNC: 0.66 MG/DL (ref 0.2–1)
BILIRUB UR QL STRIP: NEGATIVE
BUN SERPL-MCNC: 11 MG/DL (ref 5–25)
CALCIUM SERPL-MCNC: 9.9 MG/DL (ref 8.4–10.2)
CARDIAC TROPONIN I PNL SERPL HS: 4 NG/L
CARDIAC TROPONIN I PNL SERPL HS: 4 NG/L
CHLORIDE SERPL-SCNC: 103 MMOL/L (ref 96–108)
CLARITY UR: NORMAL
CO2 SERPL-SCNC: 27 MMOL/L (ref 21–32)
COLOR UR: YELLOW
CREAT SERPL-MCNC: 0.8 MG/DL (ref 0.6–1.3)
D DIMER PPP FEU-MCNC: 0.3 UG/ML FEU
EOSINOPHIL # BLD AUTO: 0.24 THOUSAND/ÂΜL (ref 0–0.61)
EOSINOPHIL NFR BLD AUTO: 3 % (ref 0–6)
ERYTHROCYTE [DISTWIDTH] IN BLOOD BY AUTOMATED COUNT: 12.4 % (ref 11.6–15.1)
EXT PREGNANCY TEST URINE: NEGATIVE
EXT. CONTROL: NORMAL
GFR SERPL CREATININE-BSD FRML MDRD: 91 ML/MIN/1.73SQ M
GLUCOSE SERPL-MCNC: 70 MG/DL (ref 65–140)
GLUCOSE UR STRIP-MCNC: NEGATIVE MG/DL
HCT VFR BLD AUTO: 46.1 % (ref 34.8–46.1)
HGB BLD-MCNC: 15.4 G/DL (ref 11.5–15.4)
HGB UR QL STRIP.AUTO: NEGATIVE
IMM GRANULOCYTES # BLD AUTO: 0.05 THOUSAND/UL (ref 0–0.2)
IMM GRANULOCYTES NFR BLD AUTO: 1 % (ref 0–2)
INR PPP: 0.94 (ref 0.84–1.19)
KETONES UR STRIP-MCNC: NEGATIVE MG/DL
LACTATE SERPL-SCNC: 1.9 MMOL/L (ref 0.5–2)
LEUKOCYTE ESTERASE UR QL STRIP: NEGATIVE
LIPASE SERPL-CCNC: 44 U/L (ref 11–82)
LYMPHOCYTES # BLD AUTO: 2.11 THOUSANDS/ÂΜL (ref 0.6–4.47)
LYMPHOCYTES NFR BLD AUTO: 26 % (ref 14–44)
MCH RBC QN AUTO: 30.6 PG (ref 26.8–34.3)
MCHC RBC AUTO-ENTMCNC: 33.4 G/DL (ref 31.4–37.4)
MCV RBC AUTO: 92 FL (ref 82–98)
MONOCYTES # BLD AUTO: 0.67 THOUSAND/ÂΜL (ref 0.17–1.22)
MONOCYTES NFR BLD AUTO: 8 % (ref 4–12)
NEUTROPHILS # BLD AUTO: 4.9 THOUSANDS/ÂΜL (ref 1.85–7.62)
NEUTS SEG NFR BLD AUTO: 61 % (ref 43–75)
NITRITE UR QL STRIP: NEGATIVE
NRBC BLD AUTO-RTO: 0 /100 WBCS
PH UR STRIP.AUTO: 5.5 [PH]
PLATELET # BLD AUTO: 358 THOUSANDS/UL (ref 149–390)
PMV BLD AUTO: 9.2 FL (ref 8.9–12.7)
POTASSIUM SERPL-SCNC: 3.5 MMOL/L (ref 3.5–5.3)
PROT SERPL-MCNC: 8.3 G/DL (ref 6.4–8.4)
PROT UR STRIP-MCNC: NEGATIVE MG/DL
PROTHROMBIN TIME: 12.7 SECONDS (ref 11.6–14.5)
RBC # BLD AUTO: 5.04 MILLION/UL (ref 3.81–5.12)
SODIUM SERPL-SCNC: 138 MMOL/L (ref 135–147)
SP GR UR STRIP.AUTO: 1.01 (ref 1–1.03)
UROBILINOGEN UR QL STRIP.AUTO: 0.2 E.U./DL
WBC # BLD AUTO: 8.04 THOUSAND/UL (ref 4.31–10.16)

## 2023-08-08 PROCEDURE — 85730 THROMBOPLASTIN TIME PARTIAL: CPT | Performed by: EMERGENCY MEDICINE

## 2023-08-08 PROCEDURE — 71045 X-RAY EXAM CHEST 1 VIEW: CPT

## 2023-08-08 PROCEDURE — 85025 COMPLETE CBC W/AUTO DIFF WBC: CPT | Performed by: EMERGENCY MEDICINE

## 2023-08-08 PROCEDURE — 76705 ECHO EXAM OF ABDOMEN: CPT

## 2023-08-08 PROCEDURE — 85379 FIBRIN DEGRADATION QUANT: CPT | Performed by: EMERGENCY MEDICINE

## 2023-08-08 PROCEDURE — 83690 ASSAY OF LIPASE: CPT | Performed by: EMERGENCY MEDICINE

## 2023-08-08 PROCEDURE — 81025 URINE PREGNANCY TEST: CPT | Performed by: EMERGENCY MEDICINE

## 2023-08-08 PROCEDURE — 85610 PROTHROMBIN TIME: CPT | Performed by: EMERGENCY MEDICINE

## 2023-08-08 PROCEDURE — 74177 CT ABD & PELVIS W/CONTRAST: CPT

## 2023-08-08 PROCEDURE — 36415 COLL VENOUS BLD VENIPUNCTURE: CPT | Performed by: EMERGENCY MEDICINE

## 2023-08-08 PROCEDURE — 84484 ASSAY OF TROPONIN QUANT: CPT | Performed by: EMERGENCY MEDICINE

## 2023-08-08 PROCEDURE — 93005 ELECTROCARDIOGRAM TRACING: CPT

## 2023-08-08 PROCEDURE — G1004 CDSM NDSC: HCPCS

## 2023-08-08 PROCEDURE — 81003 URINALYSIS AUTO W/O SCOPE: CPT | Performed by: EMERGENCY MEDICINE

## 2023-08-08 PROCEDURE — 83605 ASSAY OF LACTIC ACID: CPT | Performed by: EMERGENCY MEDICINE

## 2023-08-08 PROCEDURE — 80053 COMPREHEN METABOLIC PANEL: CPT | Performed by: EMERGENCY MEDICINE

## 2023-08-08 RX ORDER — PANTOPRAZOLE SODIUM 20 MG/1
20 TABLET, DELAYED RELEASE ORAL DAILY
Qty: 20 TABLET | Refills: 0 | Status: SHIPPED | OUTPATIENT
Start: 2023-08-08 | End: 2023-08-28

## 2023-08-08 RX ORDER — KETOROLAC TROMETHAMINE 30 MG/ML
15 INJECTION, SOLUTION INTRAMUSCULAR; INTRAVENOUS ONCE
Status: COMPLETED | OUTPATIENT
Start: 2023-08-08 | End: 2023-08-08

## 2023-08-08 RX ADMIN — KETOROLAC TROMETHAMINE 15 MG: 30 INJECTION, SOLUTION INTRAMUSCULAR; INTRAVENOUS at 11:03

## 2023-08-08 RX ADMIN — IOHEXOL 100 ML: 350 INJECTION, SOLUTION INTRAVENOUS at 11:19

## 2023-08-08 NOTE — ED NOTES
Patient transported to [address] [place] [address], [address], [address]  08/08/23 0073

## 2023-08-08 NOTE — ED PROVIDER NOTES
History  Chief Complaint   Patient presents with   • Chest Pain     Started about 15 minutes ago while she was on her way here for right side abdominal pain. Reports chest pain in her left chest and shoulder. Patient complains of right upper quadrant pain that started just prior to arrival.  No radiation of the pain. Had nausea but no vomiting. Now with chest pain going around to her back and shoulder. Worse with deep breath. No trauma. No rash. No recent cough or cold symptoms. No history of diabetes or hypertension. No history of high cholesterol. Family history of heart disease. No history of peptic ulcer disease.   Last bowel was today and normal.      History provided by:  Patient   used: No    Chest Pain  Pain location:  L chest and epigastric  Pain quality: aching    Pain radiates to:  Upper back and L shoulder  Pain radiates to the back: yes    Pain severity:  Mild  Onset quality:  Sudden  Duration:  15 minutes  Timing:  Constant  Progression:  Waxing and waning  Chronicity:  New  Context: at rest    Context: not breathing, not eating and not raising an arm    Relieved by:  Nothing  Worsened by:  Deep breathing  Ineffective treatments:  None tried  Associated symptoms: abdominal pain, nausea and shortness of breath    Associated symptoms: no claudication, no cough, no diaphoresis, no dizziness, no dysphagia, no fever, no headache, no palpitations, not vomiting and no weakness        None       Past Medical History:   Diagnosis Date   • No known health problems        Past Surgical History:   Procedure Laterality Date   • AUGMENTATION BREAST     • BUNIONECTOMY Right     around 2020   • CARPAL TUNNEL RELEASE Bilateral     july 12th, 2022 - L; R 10/16/22 - R   • FRACTURE SURGERY      1990 R tib/fib   • KIDNEY STONE SURGERY     • TUBAL LIGATION      2018   • WISDOM TOOTH EXTRACTION Bilateral        Family History   Problem Relation Age of Onset   • Hypertension Mother    • Cervical cancer Mother    • Hyperlipidemia Mother    • Diabetes Father    • Kidney failure Father         2/2 Diab   • COPD Father         emphysema   • Parkinsonism Father    • Thyroid disease Father         thyroidectomy - not sure why   • Hypertension Father    • Hyperlipidemia Father      I have reviewed and agree with the history as documented. E-Cigarette/Vaping   • E-Cigarette Use Never User      E-Cigarette/Vaping Substances   • Nicotine No    • THC No    • CBD No    • Flavoring No    • Other No    • Unknown No      Social History     Tobacco Use   • Smoking status: Never   • Smokeless tobacco: Never   Vaping Use   • Vaping Use: Never used   Substance Use Topics   • Alcohol use: Yes     Alcohol/week: 2.0 standard drinks of alcohol     Types: 2 Shots of liquor per week     Comment: occasion   • Drug use: No       Review of Systems   Constitutional: Negative for chills, diaphoresis and fever. HENT: Negative for ear pain, hearing loss, sore throat, trouble swallowing and voice change. Eyes: Negative for pain and discharge. Respiratory: Positive for shortness of breath. Negative for cough and wheezing. Cardiovascular: Positive for chest pain. Negative for palpitations and claudication. Gastrointestinal: Positive for abdominal pain and nausea. Negative for blood in stool, constipation, diarrhea and vomiting. Genitourinary: Negative for dysuria, flank pain, frequency and hematuria. Musculoskeletal: Negative for joint swelling, neck pain and neck stiffness. Skin: Negative for rash and wound. Neurological: Negative for dizziness, seizures, syncope, facial asymmetry, weakness and headaches. Psychiatric/Behavioral: Negative for hallucinations, self-injury and suicidal ideas. All other systems reviewed and are negative. Physical Exam  Physical Exam  Vitals and nursing note reviewed. Constitutional:       General: She is not in acute distress. Appearance: She is well-developed. HENT:      Head: Normocephalic and atraumatic. Right Ear: External ear normal.      Left Ear: External ear normal.   Eyes:      General: No scleral icterus. Right eye: No discharge. Left eye: No discharge. Extraocular Movements: Extraocular movements intact. Conjunctiva/sclera: Conjunctivae normal.   Cardiovascular:      Rate and Rhythm: Normal rate and regular rhythm. Heart sounds: Normal heart sounds. No murmur heard. Pulmonary:      Effort: Pulmonary effort is normal.      Breath sounds: Normal breath sounds. No wheezing or rales. Abdominal:      General: Bowel sounds are normal. There is no distension. Palpations: Abdomen is soft. Tenderness: There is abdominal tenderness. There is no guarding or rebound. Comments: Tender in the right upper quadrant. Musculoskeletal:         General: No deformity. Normal range of motion. Cervical back: Normal range of motion and neck supple. Skin:     General: Skin is warm and dry. Findings: No rash. Neurological:      General: No focal deficit present. Mental Status: She is alert and oriented to person, place, and time. Cranial Nerves: No cranial nerve deficit. Psychiatric:         Mood and Affect: Mood normal.         Behavior: Behavior normal.         Thought Content:  Thought content normal.         Judgment: Judgment normal.         Vital Signs  ED Triage Vitals [08/08/23 0939]   Temperature Pulse Respirations Blood Pressure SpO2   (!) 97 °F (36.1 °C) 74 16 121/71 100 %      Temp Source Heart Rate Source Patient Position - Orthostatic VS BP Location FiO2 (%)   Temporal Monitor Sitting Right arm --      Pain Score       4           Vitals:    08/08/23 0939 08/08/23 1045 08/08/23 1130 08/08/23 1200   BP: 121/71 125/75 106/54 110/63   Pulse: 74 82 71 68   Patient Position - Orthostatic VS: Sitting Sitting Lying Lying         Visual Acuity      ED Medications  Medications   ketorolac (TORADOL) injection 15 mg (15 mg Intravenous Given 8/8/23 1103)   iohexol (OMNIPAQUE) 350 MG/ML injection (SINGLE-DOSE) 100 mL (100 mL Intravenous Given 8/8/23 1119)       Diagnostic Studies  Results Reviewed     Procedure Component Value Units Date/Time    HS Troponin I 2hr [[id number]]     Lab Status: No result Specimen: Blood     HS Troponin I 4hr [[id number]]     Lab Status: No result Specimen: Blood     HS Troponin 0hr (reflex protocol) [[id number]]  (Normal) Collected: 08/08/23 1144    Lab Status: Final result Specimen: Blood from Arm, Right Updated: 08/08/23 1218     hs TnI 0hr 4 ng/L     UA w Reflex to Microscopic w Reflex to Culture [[id number]] Collected: 08/08/23 1031    Lab Status: Final result Specimen: Urine, Clean Catch Updated: 08/08/23 1109     Color, UA Yellow     Clarity, UA Slightly Cloudy     Specific Gravity, UA 1.010     pH, UA 5.5     Leukocytes, UA Negative     Nitrite, UA Negative     Protein, UA Negative mg/dl      Glucose, UA Negative mg/dl      Ketones, UA Negative mg/dl      Urobilinogen, UA 0.2 E.U./dl      Bilirubin, UA Negative     Occult Blood, UA Negative    Lipase [[id number]]  (Normal) Collected: 08/08/23 1004    Lab Status: Final result Specimen: Blood Updated: 08/08/23 1046     Lipase 44 u/L     Comprehensive metabolic panel [[id number]] Collected: 08/08/23 1004    Lab Status: Final result Specimen: Blood Updated: 08/08/23 1046     Sodium 138 mmol/L      Potassium 3.5 mmol/L      Chloride 103 mmol/L      CO2 27 mmol/L      ANION GAP 8 mmol/L      BUN 11 mg/dL      Creatinine 0.80 mg/dL      Glucose 70 mg/dL      Calcium 9.9 mg/dL      AST 15 U/L      ALT 17 U/L      Alkaline Phosphatase 58 U/L      Total Protein 8.3 g/dL      Albumin 4.9 g/dL      Total Bilirubin 0.66 mg/dL      eGFR 91 ml/min/1.73sq m     Narrative:      [place] guidelines for Chronic Kidney Disease (CKD):   •  Stage 1 with normal or high GFR (GFR > 90 mL/min/1.73 square meters)  •  Stage 2 Mild CKD (GFR = 60-89 mL/min/1.73 square meters)  •  Stage 3A Moderate CKD (GFR = 45-59 mL/min/1.73 square meters)  •  Stage 3B Moderate CKD (GFR = 30-44 mL/min/1.73 square meters)  •  Stage 4 Severe CKD (GFR = 15-29 mL/min/1.73 square meters)  •  Stage 5 End Stage CKD (GFR <15 mL/min/1.73 square meters)  Note: GFR calculation is accurate only with a steady state creatinine    Lactic acid, plasma (w/reflex if result > 2.0) [[id number]]  (Normal) Collected: 08/08/23 1004    Lab Status: Final result Specimen: Blood Updated: 08/08/23 1045     LACTIC ACID 1.9 mmol/L     Narrative:      Result may be elevated if tourniquet was used during collection.     HS Troponin 0hr (reflex protocol) [[id number]]  (Normal) Collected: 08/08/23 1004    Lab Status: Final result Specimen: Blood Updated: 08/08/23 1042     hs TnI 0hr 4 ng/L     Protime-INR [[id number]]  (Normal) Collected: 08/08/23 1004    Lab Status: Final result Specimen: Blood Updated: 08/08/23 1040     Protime 12.7 seconds      INR 0.94    APTT [[id number]]  (Normal) Collected: 08/08/23 1004    Lab Status: Final result Specimen: Blood Updated: 08/08/23 1040     PTT 25 seconds     D-Dimer [[id number]]  (Normal) Collected: 08/08/23 1004    Lab Status: Final result Specimen: Blood Updated: 08/08/23 1040     D-Dimer, Quant 0.30 ug/ml FEU     POCT pregnancy, urine [[id number]]  (Normal) Resulted: 08/08/23 1031    Lab Status: Final result Updated: 08/08/23 1032     EXT Preg Test, Ur Negative     Control Valid    CBC and differential [[id number]] Collected: 08/08/23 1004    Lab Status: Final result Specimen: Blood Updated: 08/08/23 1019     WBC 8.04 Thousand/uL      RBC 5.04 Million/uL      Hemoglobin 15.4 g/dL      Hematocrit 46.1 %      MCV 92 fL      MCH 30.6 pg      MCHC 33.4 g/dL      RDW 12.4 %      MPV 9.2 fL      Platelets 147 Thousands/uL      nRBC 0 /100 WBCs      Neutrophils Relative 61 %      Immat GRANS % 1 %      Lymphocytes Relative 26 %      Monocytes Relative 8 % Eosinophils Relative 3 %      Basophils Relative 1 %      Neutrophils Absolute 4.90 Thousands/µL      Immature Grans Absolute 0.05 Thousand/uL      Lymphocytes Absolute 2.11 Thousands/µL      Monocytes Absolute 0.67 Thousand/µL      Eosinophils Absolute 0.24 Thousand/µL      Basophils Absolute 0.07 Thousands/µL                  CT abdomen pelvis with contrast   Final Result by Gennaro Kennedy MD (08/08 1253)         1. No acute abnormality identified in the abdomen or pelvis. Workstation performed: [id number]         US right upper quadrant   Final Result by Laury Skinner MD (08/08 1057)      Normal.      Workstation performed: [id number]         XR chest 1 view portable   ED Interpretation by John Mcgee MD (08/08 1001)   NAD      Final Result by Monae Taveras MD (08/08 1128)      No acute cardiopulmonary disease. Workstation performed: [id number]                    Procedures  ECG 12 Lead Documentation Only    Date/Time: 8/8/2023 9:49 AM    Performed by: John Mcgee MD  Authorized by: John Mcgee MD    ECG reviewed by me, the ED Provider: yes    Patient location:  ED  Rate:     ECG rate:  80  Rhythm:     Rhythm: sinus rhythm    Ectopy:     Ectopy: none    QRS:     QRS axis:  Normal             ED Course  ED Course as of 08/08/23 1259   Tue Aug 08, 2023   1043 hs TnI 0hr: 4   1043 D-Dimer, Quant: 0.30             HEART Risk Score    Flowsheet Row Most Recent Value   Heart Score Risk Calculator    History 0 Filed at: 08/08/2023 1047   ECG 0 Filed at: 08/08/2023 1047   Age 0 Filed at: 08/08/2023 1047   Risk Factors 1 Filed at: 08/08/2023 1047   Troponin 0 Filed at: 08/08/2023 1047   HEART Score 1 Filed at: 08/08/2023 1047                        SBIRT 22yo+    Flowsheet Row Most Recent Value   Initial Alcohol Screen: US AUDIT-C     1. How often do you have a drink containing alcohol? 0 Filed at: 08/08/2023 0941   2.  How many drinks containing alcohol do you have on a typical day you are drinking? 0 Filed at: 08/08/2023 0941   3b. FEMALE Any Age, or MALE 65+: How often do you have 4 or more drinks on one occassion? 0 Filed at: 08/08/2023 0941   Audit-C Score 0 Filed at: 08/08/2023 3134   KATHI: How many times in the past year have you. .. Used an illegal drug or used a prescription medication for non-medical reasons? Never Filed at: 08/08/2023 3098                    Medical Decision Making  Amount and/or Complexity of Data Reviewed  Labs: ordered. Decision-making details documented in ED Course. Radiology: ordered and independent interpretation performed. Decision-making details documented in ED Course. ECG/medicine tests: ordered and independent interpretation performed. Decision-making details documented in ED Course. Discussion of management or test interpretation with external provider(s): Differential diagnosis includes but not limited to pneumothorax, pneumonia, STEMI, NSTEMI, acute coronary syndrome, gastritis, pancreatitis, cholelithiasis, cholecystitis, PE. Disposition  Final diagnoses:   Right upper quadrant pain   Atypical chest pain     Time reflects when diagnosis was documented in both MDM as applicable and the Disposition within this note     Time User Action Codes Description Comment    8/8/2023 12:58 PM Lesley Lyon Add [R10.11] Right upper quadrant pain     8/8/2023 12:58 PM Charis Herrera Add [R07.89] Atypical chest pain       ED Disposition     ED Disposition   Discharge    Condition   Stable    Date/Time   Tue Aug 8, 2023 12:58 PM    Comment   Timothy Mattson Ems discharge to home/self care.                Follow-up Information     Follow up With Specialties Details Why Contact Info    Lizzie Maza, DO Family Medicine Call in 1 day  [address]  [phone]            Patient's Medications   Discharge Prescriptions    PANTOPRAZOLE (PROTONIX) 20 MG TABLET    Take 1 tablet (20 mg total) by mouth daily for 20 days       Start Date: 8/8/2023  End Date: 8/28/2023       Order Dose: 20 mg       Quantity: 20 tablet    Refills: 0       No discharge procedures on file.     PDMP Review     None          ED Provider  Electronically Signed by           Loyda Patel MD  08/08/23 4596

## 2023-08-08 NOTE — Clinical Note
Dru Chen was seen and treated in our emergency department on 8/8/2023. Diagnosis:     Mercer Duane  may return to work on return date. She may return on this date: 08/09/2023         If you have any questions or concerns, please don't hesitate to call.       Rosanna Michele MD    ______________________________           _______________          _______________  Hospital Representative                              Date                                Time

## 2023-08-08 NOTE — DISCHARGE INSTRUCTIONS
[id number] to get [facility] full discharge instructions. Unfortunately, due to system errors the full instructions cannot be printed out at this time.

## 2023-08-09 ENCOUNTER — APPOINTMENT (OUTPATIENT)
Dept: LAB | Facility: CLINIC | Age: 43
End: 2023-08-09
Payer: COMMERCIAL

## 2023-08-09 DIAGNOSIS — R79.89 ABNORMAL TSH: ICD-10-CM

## 2023-08-09 LAB
ATRIAL RATE: 75 BPM
P AXIS: 30 DEGREES
PR INTERVAL: 138 MS
QRS AXIS: 78 DEGREES
QRSD INTERVAL: 76 MS
QT INTERVAL: 358 MS
QTC INTERVAL: 399 MS
T WAVE AXIS: 58 DEGREES
TSH SERPL DL<=0.05 MIU/L-ACNC: 1.55 UIU/ML (ref 0.45–4.5)
VENTRICULAR RATE: 75 BPM

## 2023-08-09 PROCEDURE — 36415 COLL VENOUS BLD VENIPUNCTURE: CPT

## 2023-08-09 PROCEDURE — 84443 ASSAY THYROID STIM HORMONE: CPT

## 2023-08-10 DIAGNOSIS — F32.A ANXIETY AND DEPRESSION: Primary | ICD-10-CM

## 2023-08-10 DIAGNOSIS — F41.9 ANXIETY AND DEPRESSION: Primary | ICD-10-CM

## 2023-08-17 ENCOUNTER — OPTICAL OFFICE (OUTPATIENT)
Dept: URBAN - NONMETROPOLITAN AREA CLINIC 4 | Facility: CLINIC | Age: 43
Setting detail: OPHTHALMOLOGY
End: 2023-08-17

## 2023-08-17 DIAGNOSIS — H52.223: ICD-10-CM

## 2023-08-17 PROCEDURE — V2020 VISION SVCS FRAMES PURCHASES: HCPCS | Performed by: OPTOMETRIST

## 2023-08-17 PROCEDURE — V2750 ANTI-REFLECTIVE COATING: HCPCS | Performed by: OPTOMETRIST

## 2023-08-17 PROCEDURE — V2781 PROGRESSIVE LENS PER LENS: HCPCS | Performed by: OPTOMETRIST

## 2023-09-19 ENCOUNTER — HOSPITAL ENCOUNTER (EMERGENCY)
Facility: HOSPITAL | Age: 43
Discharge: HOME | End: 2023-09-19
Attending: EMERGENCY MEDICINE
Payer: COMMERCIAL

## 2023-09-19 ENCOUNTER — APPOINTMENT (EMERGENCY)
Dept: ULTRASOUND IMAGING | Facility: HOSPITAL | Age: 43
End: 2023-09-19
Payer: COMMERCIAL

## 2023-09-19 VITALS
BODY MASS INDEX: 23.74 KG/M2 | RESPIRATION RATE: 18 BRPM | WEIGHT: 142.64 LBS | SYSTOLIC BLOOD PRESSURE: 106 MMHG | DIASTOLIC BLOOD PRESSURE: 58 MMHG | TEMPERATURE: 98.7 F | HEART RATE: 80 BPM | OXYGEN SATURATION: 98 %

## 2023-09-19 DIAGNOSIS — N93.8 DYSFUNCTIONAL UTERINE BLEEDING: ICD-10-CM

## 2023-09-19 DIAGNOSIS — D25.9 UTERINE FIBROID: ICD-10-CM

## 2023-09-19 DIAGNOSIS — R10.30 LOWER ABDOMINAL PAIN: Primary | ICD-10-CM

## 2023-09-19 LAB
ALBUMIN SERPL BCP-MCNC: 4.2 G/DL (ref 3.5–5)
ALP SERPL-CCNC: 53 U/L (ref 34–104)
ALT SERPL W P-5'-P-CCNC: 11 U/L (ref 7–52)
ANION GAP SERPL CALCULATED.3IONS-SCNC: 5 MMOL/L
APTT PPP: 24 SECONDS (ref 23–37)
AST SERPL W P-5'-P-CCNC: 12 U/L (ref 13–39)
B-HCG SERPL-ACNC: <1 MIU/ML (ref 0–5)
BACTERIA UR QL AUTO: ABNORMAL /HPF
BASOPHILS # BLD AUTO: 0.06 THOUSANDS/ÂΜL (ref 0–0.1)
BASOPHILS NFR BLD AUTO: 1 % (ref 0–1)
BILIRUB SERPL-MCNC: 0.64 MG/DL (ref 0.2–1)
BILIRUB UR QL STRIP: NEGATIVE
BUN SERPL-MCNC: 12 MG/DL (ref 5–25)
CALCIUM SERPL-MCNC: 8.5 MG/DL (ref 8.4–10.2)
CHLORIDE SERPL-SCNC: 107 MMOL/L (ref 96–108)
CLARITY UR: ABNORMAL
CO2 SERPL-SCNC: 27 MMOL/L (ref 21–32)
COLOR UR: ABNORMAL
CREAT SERPL-MCNC: 0.7 MG/DL (ref 0.6–1.3)
EOSINOPHIL # BLD AUTO: 0.14 THOUSAND/ÂΜL (ref 0–0.61)
EOSINOPHIL NFR BLD AUTO: 2 % (ref 0–6)
ERYTHROCYTE [DISTWIDTH] IN BLOOD BY AUTOMATED COUNT: 12.4 % (ref 11.6–15.1)
EXT PREGNANCY TEST URINE: NEGATIVE
EXT. CONTROL: NORMAL
GFR SERPL CREATININE-BSD FRML MDRD: 107 ML/MIN/1.73SQ M
GLUCOSE SERPL-MCNC: 89 MG/DL (ref 65–140)
GLUCOSE UR STRIP-MCNC: NEGATIVE MG/DL
HCT VFR BLD AUTO: 42.8 % (ref 34.8–46.1)
HGB BLD-MCNC: 14.3 G/DL (ref 11.5–15.4)
HGB UR QL STRIP.AUTO: ABNORMAL
IMM GRANULOCYTES # BLD AUTO: 0.02 THOUSAND/UL (ref 0–0.2)
IMM GRANULOCYTES NFR BLD AUTO: 0 % (ref 0–2)
INR PPP: 0.93 (ref 0.84–1.19)
KETONES UR STRIP-MCNC: NEGATIVE MG/DL
LACTATE SERPL-SCNC: 1.1 MMOL/L (ref 0.5–2)
LEUKOCYTE ESTERASE UR QL STRIP: NEGATIVE
LYMPHOCYTES # BLD AUTO: 1.52 THOUSANDS/ÂΜL (ref 0.6–4.47)
LYMPHOCYTES NFR BLD AUTO: 22 % (ref 14–44)
MCH RBC QN AUTO: 30.8 PG (ref 26.8–34.3)
MCHC RBC AUTO-ENTMCNC: 33.4 G/DL (ref 31.4–37.4)
MCV RBC AUTO: 92 FL (ref 82–98)
MONOCYTES # BLD AUTO: 0.62 THOUSAND/ÂΜL (ref 0.17–1.22)
MONOCYTES NFR BLD AUTO: 9 % (ref 4–12)
NEUTROPHILS # BLD AUTO: 4.71 THOUSANDS/ÂΜL (ref 1.85–7.62)
NEUTS SEG NFR BLD AUTO: 66 % (ref 43–75)
NITRITE UR QL STRIP: NEGATIVE
NON-SQ EPI CELLS URNS QL MICRO: ABNORMAL /HPF
NRBC BLD AUTO-RTO: 0 /100 WBCS
PH UR STRIP.AUTO: 5 [PH]
PLATELET # BLD AUTO: 336 THOUSANDS/UL (ref 149–390)
PMV BLD AUTO: 8.8 FL (ref 8.9–12.7)
POTASSIUM SERPL-SCNC: 3.7 MMOL/L (ref 3.5–5.3)
PROT SERPL-MCNC: 7.2 G/DL (ref 6.4–8.4)
PROT UR STRIP-MCNC: NEGATIVE MG/DL
PROTHROMBIN TIME: 12.7 SECONDS (ref 11.6–14.5)
RBC # BLD AUTO: 4.64 MILLION/UL (ref 3.81–5.12)
RBC #/AREA URNS AUTO: ABNORMAL /HPF
SODIUM SERPL-SCNC: 139 MMOL/L (ref 135–147)
SP GR UR STRIP.AUTO: 1.02 (ref 1–1.03)
UROBILINOGEN UR QL STRIP.AUTO: 1 E.U./DL
WBC # BLD AUTO: 7.07 THOUSAND/UL (ref 4.31–10.16)
WBC #/AREA URNS AUTO: ABNORMAL /HPF

## 2023-09-19 PROCEDURE — 83605 ASSAY OF LACTIC ACID: CPT | Performed by: EMERGENCY MEDICINE

## 2023-09-19 PROCEDURE — 76830 TRANSVAGINAL US NON-OB: CPT

## 2023-09-19 PROCEDURE — 76856 US EXAM PELVIC COMPLETE: CPT

## 2023-09-19 PROCEDURE — 84702 CHORIONIC GONADOTROPIN TEST: CPT | Performed by: EMERGENCY MEDICINE

## 2023-09-19 PROCEDURE — 85610 PROTHROMBIN TIME: CPT | Performed by: EMERGENCY MEDICINE

## 2023-09-19 PROCEDURE — 96361 HYDRATE IV INFUSION ADD-ON: CPT

## 2023-09-19 PROCEDURE — 85730 THROMBOPLASTIN TIME PARTIAL: CPT | Performed by: EMERGENCY MEDICINE

## 2023-09-19 PROCEDURE — 36415 COLL VENOUS BLD VENIPUNCTURE: CPT | Performed by: EMERGENCY MEDICINE

## 2023-09-19 PROCEDURE — 96374 THER/PROPH/DIAG INJ IV PUSH: CPT

## 2023-09-19 PROCEDURE — 81025 URINE PREGNANCY TEST: CPT | Performed by: EMERGENCY MEDICINE

## 2023-09-19 PROCEDURE — 96375 TX/PRO/DX INJ NEW DRUG ADDON: CPT

## 2023-09-19 PROCEDURE — 80053 COMPREHEN METABOLIC PANEL: CPT | Performed by: EMERGENCY MEDICINE

## 2023-09-19 PROCEDURE — 81001 URINALYSIS AUTO W/SCOPE: CPT | Performed by: EMERGENCY MEDICINE

## 2023-09-19 PROCEDURE — 99284 EMERGENCY DEPT VISIT MOD MDM: CPT

## 2023-09-19 PROCEDURE — 99284 EMERGENCY DEPT VISIT MOD MDM: CPT | Performed by: EMERGENCY MEDICINE

## 2023-09-19 PROCEDURE — 85025 COMPLETE CBC W/AUTO DIFF WBC: CPT | Performed by: EMERGENCY MEDICINE

## 2023-09-19 RX ORDER — NAPROXEN 500 MG/1
500 TABLET ORAL 2 TIMES DAILY WITH MEALS
Qty: 30 TABLET | Refills: 0 | Status: SHIPPED | OUTPATIENT
Start: 2023-09-19

## 2023-09-19 RX ORDER — KETOROLAC TROMETHAMINE 30 MG/ML
15 INJECTION, SOLUTION INTRAMUSCULAR; INTRAVENOUS ONCE
Status: COMPLETED | OUTPATIENT
Start: 2023-09-19 | End: 2023-09-19

## 2023-09-19 RX ORDER — ONDANSETRON 2 MG/ML
4 INJECTION INTRAMUSCULAR; INTRAVENOUS ONCE
Status: COMPLETED | OUTPATIENT
Start: 2023-09-19 | End: 2023-09-19

## 2023-09-19 RX ADMIN — ONDANSETRON 4 MG: 2 INJECTION INTRAMUSCULAR; INTRAVENOUS at 07:46

## 2023-09-19 RX ADMIN — SODIUM CHLORIDE 1000 ML: 0.9 INJECTION, SOLUTION INTRAVENOUS at 07:41

## 2023-09-19 RX ADMIN — KETOROLAC TROMETHAMINE 15 MG: 30 INJECTION, SOLUTION INTRAMUSCULAR at 07:45

## 2023-09-19 NOTE — Clinical Note
Monica Louis was seen and treated in our emergency department on 9/19/2023. Diagnosis:     Yfn Spaulding  may return to work on return date. She may return on this date: 09/21/2023         If you have any questions or concerns, please don't hesitate to call.       Mary Husain MD    ______________________________           _______________          _______________  Hospital Representative                              Date                                Time

## 2023-09-19 NOTE — ED PROVIDER NOTES
History  Chief Complaint   Patient presents with   • Abdominal Pain     Patient c/o worsening mid lower abdominal pain. Patient c/o heavy vaginal bleeding with nausea. Patient states she had not had her period since June. Had a small amount of spotting 2 weeks ago. Now having light bleeding starting yesterday and then very heavy bleeding since last night. Having lower abdominal cramping. No radiation. Has nausea but no vomiting. Advil without any relief. No urinary frequency but slight dysuria. Patient was seen here for similar complaints last year with a negative work-up. Patient did have a uterine fibroid noted on ultrasound then. History provided by:  Patient   used: No    Abdominal Pain  Pain location:  Suprapubic and LLQ  Pain quality: aching    Pain radiates to:  Does not radiate  Pain severity:  Mild  Onset quality:  Gradual  Duration:  1 day  Timing:  Constant  Progression:  Worsening  Chronicity:  New  Context: not diet changes, not recent sexual activity and not sick contacts    Relieved by:  Nothing  Worsened by:  Nothing  Ineffective treatments:  NSAIDs  Associated symptoms: anorexia, dysuria, nausea and vaginal bleeding    Associated symptoms: no chest pain, no chills, no constipation, no cough, no diarrhea, no fever, no hematuria, no shortness of breath, no sore throat and no vomiting        Prior to Admission Medications   Prescriptions Last Dose Informant Patient Reported? Taking?    pantoprazole (PROTONIX) 20 mg tablet   No No   Sig: Take 1 tablet (20 mg total) by mouth daily for 20 days      Facility-Administered Medications: None       Past Medical History:   Diagnosis Date   • No known health problems        Past Surgical History:   Procedure Laterality Date   • AUGMENTATION BREAST     • BUNIONECTOMY Right     around 2020   • CARPAL TUNNEL RELEASE Bilateral     july 12th, 2022 - L; R 10/16/22 - R   • FRACTURE SURGERY      1990 R tib/fib   • KIDNEY STONE SURGERY     • TUBAL LIGATION      2018   • WISDOM TOOTH EXTRACTION Bilateral        Family History   Problem Relation Age of Onset   • Hypertension Mother    • Cervical cancer Mother    • Hyperlipidemia Mother    • Diabetes Father    • Kidney failure Father         2/2 Diab   • COPD Father         emphysema   • Parkinsonism Father    • Thyroid disease Father         thyroidectomy - not sure why   • Hypertension Father    • Hyperlipidemia Father      I have reviewed and agree with the history as documented. E-Cigarette/Vaping   • E-Cigarette Use Never User      E-Cigarette/Vaping Substances   • Nicotine No    • THC No    • CBD No    • Flavoring No    • Other No    • Unknown No      Social History     Tobacco Use   • Smoking status: Never   • Smokeless tobacco: Never   Vaping Use   • Vaping Use: Never used   Substance Use Topics   • Alcohol use: Yes     Alcohol/week: 2.0 standard drinks of alcohol     Types: 2 Shots of liquor per week     Comment: occasion   • Drug use: No       Review of Systems   Constitutional: Negative for chills and fever. HENT: Negative for ear pain, hearing loss, sore throat, trouble swallowing and voice change. Eyes: Negative for pain and discharge. Respiratory: Negative for cough, shortness of breath and wheezing. Cardiovascular: Negative for chest pain and palpitations. Gastrointestinal: Positive for abdominal pain, anorexia and nausea. Negative for blood in stool, constipation, diarrhea and vomiting. Genitourinary: Positive for dysuria and vaginal bleeding. Negative for flank pain, frequency and hematuria. Musculoskeletal: Negative for joint swelling, neck pain and neck stiffness. Skin: Negative for rash and wound. Neurological: Negative for dizziness, seizures, syncope, facial asymmetry and headaches. Psychiatric/Behavioral: Negative for hallucinations, self-injury and suicidal ideas. All other systems reviewed and are negative.       Physical Exam  Physical Exam  Vitals and nursing note reviewed. Constitutional:       General: She is not in acute distress. Appearance: She is well-developed. HENT:      Head: Normocephalic and atraumatic. Right Ear: External ear normal.      Left Ear: External ear normal.   Eyes:      General: No scleral icterus. Right eye: No discharge. Left eye: No discharge. Extraocular Movements: Extraocular movements intact. Conjunctiva/sclera: Conjunctivae normal.   Cardiovascular:      Rate and Rhythm: Normal rate and regular rhythm. Heart sounds: Normal heart sounds. No murmur heard. Pulmonary:      Effort: Pulmonary effort is normal.      Breath sounds: Normal breath sounds. No wheezing or rales. Abdominal:      General: Bowel sounds are normal. There is no distension. Palpations: Abdomen is soft. Tenderness: There is abdominal tenderness in the suprapubic area and left lower quadrant. There is no guarding or rebound. Musculoskeletal:         General: No deformity. Normal range of motion. Cervical back: Normal range of motion and neck supple. Skin:     General: Skin is warm and dry. Findings: No rash. Neurological:      General: No focal deficit present. Mental Status: She is alert and oriented to person, place, and time. Cranial Nerves: No cranial nerve deficit. Psychiatric:         Mood and Affect: Mood normal.         Behavior: Behavior normal.         Thought Content:  Thought content normal.         Judgment: Judgment normal.         Vital Signs  ED Triage Vitals   Temperature Pulse Respirations Blood Pressure SpO2   09/19/23 0730 09/19/23 0730 09/19/23 0730 09/19/23 0730 09/19/23 0730   98.7 °F (37.1 °C) 80 18 106/58 98 %      Temp Source Heart Rate Source Patient Position - Orthostatic VS BP Location FiO2 (%)   09/19/23 0730 09/19/23 0730 09/19/23 0730 09/19/23 0730 --   Temporal Monitor Lying Right arm       Pain Score       09/19/23 0745       7 Vitals:    09/19/23 0730   BP: 106/58   Pulse: 80   Patient Position - Orthostatic VS: Lying         Visual Acuity      ED Medications  Medications   sodium chloride 0.9 % bolus 1,000 mL (0 mL Intravenous Stopped 9/19/23 0823)   ondansetron (ZOFRAN) injection 4 mg (4 mg Intravenous Given 9/19/23 0746)   ketorolac (TORADOL) injection 15 mg (15 mg Intravenous Given 9/19/23 0745)       Diagnostic Studies  Results Reviewed     Procedure Component Value Units Date/Time    Comprehensive metabolic panel [[id number]]  (Abnormal) Collected: 09/19/23 0742    Lab Status: Final result Specimen: Blood from Arm, Right Updated: 09/19/23 0815     Sodium 139 mmol/L      Potassium 3.7 mmol/L      Chloride 107 mmol/L      CO2 27 mmol/L      ANION GAP 5 mmol/L      BUN 12 mg/dL      Creatinine 0.70 mg/dL      Glucose 89 mg/dL      Calcium 8.5 mg/dL      AST 12 U/L      ALT 11 U/L      Alkaline Phosphatase 53 U/L      Total Protein 7.2 g/dL      Albumin 4.2 g/dL      Total Bilirubin 0.64 mg/dL      eGFR 107 ml/min/1.73sq m     Narrative:      [place] guidelines for Chronic Kidney Disease (CKD):   •  Stage 1 with normal or high GFR (GFR > 90 mL/min/1.73 square meters)  •  Stage 2 Mild CKD (GFR = 60-89 mL/min/1.73 square meters)  •  Stage 3A Moderate CKD (GFR = 45-59 mL/min/1.73 square meters)  •  Stage 3B Moderate CKD (GFR = 30-44 mL/min/1.73 square meters)  •  Stage 4 Severe CKD (GFR = 15-29 mL/min/1.73 square meters)  •  Stage 5 End Stage CKD (GFR <15 mL/min/1.73 square meters)  Note: GFR calculation is accurate only with a steady state creatinine    Urine Microscopic [[id number]]  (Abnormal) Collected: 09/19/23 0745    Lab Status: Final result Specimen: Urine, Clean Catch Updated: 09/19/23 0815     RBC, UA 30-50 /hpf      WBC, UA 0-1 /hpf      Epithelial Cells Occasional /hpf      Bacteria, UA None Seen /hpf     POCT pregnancy, urine [[id number]]  (Normal) Resulted: 09/19/23 0807    Lab Status: Final result Updated: 09/19/23 0812     EXT Preg Test, Ur Negative     Control Valid    Lactic acid, plasma (w/reflex if result > 2.0) [[id number]]  (Normal) Collected: 09/19/23 0742    Lab Status: Final result Specimen: Blood from Arm, Right Updated: 09/19/23 0810     LACTIC ACID 1.1 mmol/L     Narrative:      Result may be elevated if tourniquet was used during collection.     UA w Reflex to Microscopic w Reflex to Culture [[id number]]  (Abnormal) Collected: 09/19/23 0745    Lab Status: Final result Specimen: Urine, Clean Catch Updated: 09/19/23 0807     Color, UA Pink     Clarity, UA Slightly Cloudy     Specific Gravity, UA 1.025     pH, UA 5.0     Leukocytes, UA Negative     Nitrite, UA Negative     Protein, UA Negative mg/dl      Glucose, UA Negative mg/dl      Ketones, UA Negative mg/dl      Urobilinogen, UA 1.0 E.U./dl      Bilirubin, UA Negative     Occult Blood, UA Large    Protime-INR [[id number]]  (Normal) Collected: 09/19/23 0742    Lab Status: Final result Specimen: Blood from Arm, Right Updated: 09/19/23 0800     Protime 12.7 seconds      INR 0.93    APTT [[id number]]  (Normal) Collected: 09/19/23 0742    Lab Status: Final result Specimen: Blood from Arm, Right Updated: 09/19/23 0800     PTT 24 seconds     CBC and differential [[id number]]  (Abnormal) Collected: 09/19/23 0742    Lab Status: Final result Specimen: Blood from Arm, Right Updated: 09/19/23 0748     WBC 7.07 Thousand/uL      RBC 4.64 Million/uL      Hemoglobin 14.3 g/dL      Hematocrit 42.8 %      MCV 92 fL      MCH 30.8 pg      MCHC 33.4 g/dL      RDW 12.4 %      MPV 8.8 fL      Platelets 975 Thousands/uL      nRBC 0 /100 WBCs      Neutrophils Relative 66 %      Immat GRANS % 0 %      Lymphocytes Relative 22 %      Monocytes Relative 9 %      Eosinophils Relative 2 %      Basophils Relative 1 %      Neutrophils Absolute 4.71 Thousands/µL      Immature Grans Absolute 0.02 Thousand/uL      Lymphocytes Absolute 1.52 Thousands/µL      Monocytes Absolute 0.62 Thousand/µL      Eosinophils Absolute 0.14 Thousand/µL      Basophils Absolute 0.06 Thousands/µL     hCG, quantitative [[id number]] Collected: 09/19/23 0742    Lab Status: In process Specimen: Blood from Arm, Right Updated: 09/19/23 0744                 US pelvis complete w transvaginal   Final Result by Cindi Shaikh MD (09/19 0825)      Stable right anterior mid uterine body submucosal nodule. This may represent small fibroid or adenomyoma. Remainder of the examination is normal.                              Workstation performed: [id number]                    Procedures  Procedures         ED Course  ED Course as of 09/19/23 0830   Tue Sep 19, 2023   0827 Symptoms coincided with vaginal bleeding. Balance have been normal.  White blood cell count is normal.  Afebrile. Doubt acute infectious etiology. Doubt diverticulitis. SBIRT 20yo+    Flowsheet Row Most Recent Value   Initial Alcohol Screen: US AUDIT-C     1. How often do you have a drink containing alcohol? 0 Filed at: 09/19/2023 0802   2. How many drinks containing alcohol do you have on a typical day you are drinking? 0 Filed at: 09/19/2023 0802   3a. Male UNDER 65: How often do you have five or more drinks on one occasion? 0 Filed at: 09/19/2023 0802   3b. FEMALE Any Age, or MALE 65+: How often do you have 4 or more drinks on one occassion? 0 Filed at: 09/19/2023 0802   Audit-C Score 0 Filed at: 09/19/2023 2661   KATHI: How many times in the past year have you. .. Used an illegal drug or used a prescription medication for non-medical reasons? Never Filed at: 09/19/2023 5628                    Medical Decision Making  Amount and/or Complexity of Data Reviewed  Labs: ordered. Decision-making details documented in ED Course. Radiology: ordered. Decision-making details documented in ED Course.   Discussion of management or test interpretation with external provider(s): Differential diagnosis includes but not limited  diverticulitis, colitis, bowel obstruction, appendicitis, UTI, ureteral stone, kidney stone, inflammatory bowel disease. Also included in the differential is pregnancy, uterine fibroid, dysfunctional uterine bleeding. Risk  Prescription drug management. Disposition  Final diagnoses:   Lower abdominal pain   Uterine fibroid   Dysfunctional uterine bleeding     Time reflects when diagnosis was documented in both MDM as applicable and the Disposition within this note     Time User Action Codes Description Comment    9/19/2023  8:17 AM Francois Lyon [R10.30] Lower abdominal pain     9/19/2023  8:17 AM Francois Lyon [D25.9] Uterine fibroid     9/19/2023  8:17 AM Francois Lyon [N93.8] Dysfunctional uterine bleeding       ED Disposition     ED Disposition   Discharge    Condition   Stable    Date/Time   Tue Sep 19, 2023  8:28 AM    Comment   Achilles More Ems discharge to home/self care. Follow-up Information    None         Patient's Medications   Discharge Prescriptions    NAPROXEN (NAPROSYN) 500 MG TABLET    Take 1 tablet (500 mg total) by mouth 2 (two) times a day with meals       Start Date: 9/19/2023 End Date: --       Order Dose: 500 mg       Quantity: 30 tablet    Refills: 0       No discharge procedures on file.     PDMP Review     None          ED Provider  Electronically Signed by           Dexter Wren MD  09/19/23 1206 CHASE Laura MD  09/19/23 [address] Zenaida Laura MD  09/19/23 1088

## 2023-09-26 ENCOUNTER — TELEPHONE (OUTPATIENT)
Dept: GYNECOLOGY | Facility: CLINIC | Age: 43
End: 2023-09-26

## 2023-09-26 ENCOUNTER — OFFICE VISIT (OUTPATIENT)
Dept: GYNECOLOGY | Facility: CLINIC | Age: 43
End: 2023-09-26
Payer: COMMERCIAL

## 2023-09-26 DIAGNOSIS — R10.2 PELVIC PAIN: Primary | ICD-10-CM

## 2023-09-26 PROCEDURE — 99204 OFFICE O/P NEW MOD 45 MIN: CPT | Performed by: OBSTETRICS & GYNECOLOGY

## 2023-09-26 NOTE — PROGRESS NOTES
Assessment/Plan:         Diagnoses and all orders for this visit:    Pelvic pain ; suspect ruptured ovarian cyst.  Patient will return to the office for TVS SIS secondary to pain and abnormal uterine bleeding, and suggestion of submucosal fibroid        Subjective:      Patient ID: Cyndee Merlos is a 41 y.o. female.  [phone],  X2, prior tubal, new patient, presents as a follow-up from an emergency room visit. Patient presented complaining of worsening mid lower abdominal pain associated with heavy vaginal bleeding. Her last normal menstrual period was in . She had an episode of spotting 2 weeks prior to her visit to the emergency room. When she presented to the emergency room spotting had again occurred followed by heavy vaginal bleeding with passage of large clots and lower abdominal cramping. She did have some nausea but no vomiting. Denied any dysuria, hematuria urgency or urinary incontinence. No GI complaints. Ultrasound revealed;  US pelvis complete w transvaginal   Final Result by Clarita Bentley MD ([id number])       Stable right anterior mid uterine body submucosal nodule. This may represent small fibroid or adenomyoma.       Remainder of the examination is normal.     Since visit to the emergency room pain is nearly completely resolved. The following portions of the patient's history were reviewed and updated as appropriate:   She  has a past medical history of No known health problems. She   Patient Active Problem List    Diagnosis Date Noted   • Dysfunctional uterine bleeding 2022     She  has a past surgical history that includes Kidney stone surgery; AUGMENTATION BREAST; Fracture surgery; Carpal tunnel release (Bilateral); Bunionectomy (Right); [place] tooth extraction (Bilateral); and Tubal ligation.   Her family history includes COPD in her father; Cervical cancer in her mother; Diabetes in her father; Hyperlipidemia in her father and mother; Hypertension in her father and mother; Kidney failure in her father; Parkinsonism in her father; Thyroid disease in her father. She  reports that she has never smoked. She has never used smokeless tobacco. She reports current alcohol use of about 2.0 standard drinks of alcohol per week. She reports that she does not use drugs. Current Outpatient Medications   Medication Sig Dispense Refill   • naproxen (Naprosyn) 500 mg tablet Take 1 tablet (500 mg total) by mouth 2 (two) times a day with meals 30 tablet 0   • pantoprazole (PROTONIX) 20 mg tablet Take 1 tablet (20 mg total) by mouth daily for 20 days 20 tablet 0     No current facility-administered medications for this visit. Current Outpatient Medications on File Prior to Visit   Medication Sig   • naproxen (Naprosyn) 500 mg tablet Take 1 tablet (500 mg total) by mouth 2 (two) times a day with meals   • pantoprazole (PROTONIX) 20 mg tablet Take 1 tablet (20 mg total) by mouth daily for 20 days     No current facility-administered medications on file prior to visit. She is allergic to vicodin [hydrocodone-acetaminophen]. .    Review of Systems   Constitutional: Negative. Gastrointestinal: Positive for abdominal pain (tenderness LLQ). Genitourinary: Negative. Objective:      LMP 09/18/2023 Comment: last period prior to this one was june 2023         Physical Exam  Vitals reviewed. Constitutional:       Appearance: Normal appearance. Cardiovascular:      Rate and Rhythm: Normal rate and regular rhythm. Pulses: Normal pulses. Heart sounds: Normal heart sounds. No murmur heard. Pulmonary:      Effort: Pulmonary effort is normal. No respiratory distress. Breath sounds: Normal breath sounds. Abdominal:      General: There is no distension. Palpations: Abdomen is soft. There is no mass. Tenderness: There is abdominal tenderness (Minimal tenderness left lower quadrant). There is no guarding or rebound.       Hernia: No hernia is present. There is no hernia in the left inguinal area or right inguinal area. Genitourinary:     General: Normal vulva. Labia:         Right: No rash, tenderness or lesion. Left: No rash, tenderness or lesion. Vagina: Normal.      Cervix: Normal.      Uterus: Normal.       Adnexa:         Right: No mass, tenderness or fullness. Left: Tenderness present. No mass or fullness. Musculoskeletal:      Cervical back: Normal range of motion and neck supple. No tenderness. Lymphadenopathy:      Cervical: No cervical adenopathy. Lower Body: No right inguinal adenopathy. No left inguinal adenopathy. Neurological:      Mental Status: She is alert.

## 2023-09-26 NOTE — TELEPHONE ENCOUNTER
Intermittent FMLA for 09/19/23, 09/20/23, 09/26/23 &09/27/23 was filled out and scanned into chart, faxed to [facility] and a copy at desk for patient who is coming in tomorrow.

## 2023-09-27 ENCOUNTER — ULTRASOUND (OUTPATIENT)
Dept: GYNECOLOGY | Facility: CLINIC | Age: 43
End: 2023-09-27
Payer: COMMERCIAL

## 2023-09-27 ENCOUNTER — OFFICE VISIT (OUTPATIENT)
Dept: GYNECOLOGY | Facility: CLINIC | Age: 43
End: 2023-09-27
Payer: COMMERCIAL

## 2023-09-27 DIAGNOSIS — Z01.818 PRE-OP TESTING: ICD-10-CM

## 2023-09-27 DIAGNOSIS — N84.0 ENDOMETRIAL POLYP: Primary | ICD-10-CM

## 2023-09-27 DIAGNOSIS — N93.9 ABNORMAL UTERINE BLEEDING (AUB): ICD-10-CM

## 2023-09-27 DIAGNOSIS — N93.9 ABNORMAL UTERINE BLEEDING (AUB): Primary | ICD-10-CM

## 2023-09-27 PROCEDURE — 88305 TISSUE EXAM BY PATHOLOGIST: CPT | Performed by: PATHOLOGY

## 2023-09-27 PROCEDURE — 76831 ECHO EXAM UTERUS: CPT | Performed by: OBSTETRICS & GYNECOLOGY

## 2023-09-27 PROCEDURE — 99214 OFFICE O/P EST MOD 30 MIN: CPT | Performed by: OBSTETRICS & GYNECOLOGY

## 2023-09-27 PROCEDURE — 58100 BIOPSY OF UTERUS LINING: CPT | Performed by: OBSTETRICS & GYNECOLOGY

## 2023-09-27 PROCEDURE — 58340 CATHETER FOR HYSTEROGRAPHY: CPT | Performed by: OBSTETRICS & GYNECOLOGY

## 2023-09-27 PROCEDURE — 76830 TRANSVAGINAL US NON-OB: CPT | Performed by: OBSTETRICS & GYNECOLOGY

## 2023-09-27 NOTE — PROGRESS NOTES
Assessment/Plan:         Diagnoses and all orders for this visit:    Endometrial polyp; patient is admitted for hysteroscopy D&C polypectomy. Procedure and risks reviewed. Abnormal uterine bleeding (AUB)        Subjective:      Patient ID: Susette Ganser is a 41 y.o. female. HPI -0-1-2, spontaneous vaginal delivery x2, prior tubal seen in the office as a new patient on  as a follow-up from emergency room visit. Patient presented to the emergency room complaining of worsening mid lower abdominal pain associated with heavy vaginal bleeding. Her last normal menstrual period prior to this was in . She had an episode of spotting 2 weeks prior to her visit to the emergency room. When she presented to the emergency room spotting had again occurred followed by heavy vaginal bleeding with passage of large clots. Since that visit the pain has subsided.   She was advised to return the office for TVS SIS possible biopsy  Needed                                                           AMB US Pelvic Non OB     Date/Time: 2023 2:00 PM     Performed by: Thad Becker  Authorized by: Cuca Hidalgo DO  [place] Protocol:  Consent given by: patient  Patient identity confirmed: verbally with patient        Procedure details:     Technique:  Transvaginal US, Non-OB    Position: lithotomy exam    Uterine findings:     Length (cm): 9.06    Height (cm):  4.76    Width (cm):  5.62    Endometrial stripe: identified      Endometrium thickness (mm):  9.09  Left ovary findings:     Left ovary:  Visualized    Length (cm): 4.31    Height (cm): 2.31    Width (cm): 2.83  Right ovary findings:     Right ovary:  Visualized    Length (cm): 3.99    Height (cm): 2.27    Width (cm): 2.72  Other findings:     Free pelvic fluid: not identified      Free peritoneal fluid: not identified    Post-Procedure Details:     Impression:  Anteverted uterus demonstrates an anterior intramural fibroid, 1cm, likely submucosal. The endometrium is echogenic. Otherwise, the uterus an bilateral ovaries appear within normal limits. No free fluid. Tolerance: Tolerated well, no immediate complications    Complications: no complications    Additional Procedure Comments:      GE Voluson P8 transvaginal transducer RIC5-RA with Serial Number [id number] was used during procedure and subsequently cleaned with high level disinfection utilizing the [employer].      Ultrasound performed at:   [address] [facility] [address] Nilton Robbins, [address] [place] [address]  Phone: [phone]  Fax:  [phone]     Sonohysterogram     Date/Time: 9/27/2023 2:00 PM     Performed by: Meredith Mar DO  Authorized by: Meredith Mar DO  Universal Protocol:  Consent given by: patient  Patient identity confirmed: verbally with patient        Pre-procedure:     Prepped with: Betadine    Procedure:     Cervix cleaned and prepped: yes      Uterus sounded: yes      Catheter inserted: yes      Uterine cavity distended with saline: yes    Post-procedure:     Patient observed: yes      Post procedure instructions given to patient: yes      Patient tolerated procedure well with no complications: yes    Comments:      Sonohysterogram demonstrates a polyp within the endometrium.  The possible submucosal fibroid noted on TVS is intramural.   Endometrial biopsy     Date/Time: 9/27/2023 2:00 PM     Performed by: Damaris Lantigua  Authorized by: Meredith Mar DO  Universal Protocol:  Consent given by: patient  Patient understanding: patient states understanding of the procedure being performed  Patient identity confirmed: verbally with patient        Procedure:     Procedure: endometrial biopsy with Pipelle      A bivalve speculum was placed in the vagina: yes      Cervix cleaned and prepped: yes      Specimen collected: specimen collected and sent to pathology      Patient tolerated procedure well with no complications: yes                    The following portions of the patient's history were reviewed and updated as appropriate:   She  has a past medical history of No known health problems. She   Patient Active Problem List    Diagnosis Date Noted   • Dysfunctional uterine bleeding 07/26/2022     She  has a past surgical history that includes Kidney stone surgery; AUGMENTATION BREAST; Fracture surgery; Carpal tunnel release (Bilateral); Bunionectomy (Right); [place] tooth extraction (Bilateral); and Tubal ligation. Her family history includes COPD in her father; Cervical cancer in her mother; Diabetes in her father; Hyperlipidemia in her father and mother; Hypertension in her father and mother; Kidney failure in her father; Parkinsonism in her father; Thyroid disease in her father. She  reports that she has never smoked. She has never used smokeless tobacco. She reports current alcohol use of about 2.0 standard drinks of alcohol per week. She reports that she does not use drugs. Current Outpatient Medications   Medication Sig Dispense Refill   • naproxen (Naprosyn) 500 mg tablet Take 1 tablet (500 mg total) by mouth 2 (two) times a day with meals 30 tablet 0   • pantoprazole (PROTONIX) 20 mg tablet Take 1 tablet (20 mg total) by mouth daily for 20 days 20 tablet 0     No current facility-administered medications for this visit. Current Outpatient Medications on File Prior to Visit   Medication Sig   • naproxen (Naprosyn) 500 mg tablet Take 1 tablet (500 mg total) by mouth 2 (two) times a day with meals   • pantoprazole (PROTONIX) 20 mg tablet Take 1 tablet (20 mg total) by mouth daily for 20 days     No current facility-administered medications on file prior to visit. She is allergic to vicodin [hydrocodone-acetaminophen]. .    Review of Systems      Objective:      LMP 09/18/2023 Comment: last period prior to this one was june 2023         Physical Exam  Vitals reviewed.    Constitutional: Appearance: Normal appearance. She is normal weight. Cardiovascular:      Rate and Rhythm: Normal rate and regular rhythm. Pulses: Normal pulses. Heart sounds: Normal heart sounds. Pulmonary:      Effort: Pulmonary effort is normal.      Breath sounds: Normal breath sounds. Abdominal:      General: There is no distension. Palpations: Abdomen is soft. There is no mass. Tenderness: There is no abdominal tenderness. There is no guarding or rebound. Hernia: No hernia is present. There is no hernia in the left inguinal area or right inguinal area. Genitourinary:     General: Normal vulva. Labia:         Right: No rash, tenderness or lesion. Left: No rash, tenderness or lesion. Vagina: Normal.      Cervix: Normal.      Uterus: Normal.       Adnexa:         Right: No mass, tenderness or fullness. Left: No mass, tenderness or fullness. Lymphadenopathy:      Lower Body: No right inguinal adenopathy. No left inguinal adenopathy. Neurological:      Mental Status: She is alert.

## 2023-09-27 NOTE — PROGRESS NOTES
AMB US Pelvic Non OB    Date/Time: 9/27/2023 2:00 PM    Performed by: Grace Alvarez  Authorized by: Mimi Maza DO  Universal Protocol:  Consent given by: patient  Patient identity confirmed: verbally with patient      Procedure details:     Technique:  Transvaginal US, Non-OB    Position: lithotomy exam    Uterine findings:     Length (cm): 9.06    Height (cm):  4.76    Width (cm):  5.62    Endometrial stripe: identified      Endometrium thickness (mm):  9.09  Left ovary findings:     Left ovary:  Visualized    Length (cm): 4.31    Height (cm): 2.31    Width (cm): 2.83  Right ovary findings:     Right ovary:  Visualized    Length (cm): 3.99    Height (cm): 2.27    Width (cm): 2.72  Other findings:     Free pelvic fluid: not identified      Free peritoneal fluid: not identified    Post-Procedure Details:     Impression:  Anteverted uterus demonstrates an anterior intramural fibroid, 1cm, likely submucosal. The endometrium is echogenic. Otherwise, the uterus an bilateral ovaries appear within normal limits. No free fluid. Tolerance: Tolerated well, no immediate complications    Complications: no complications    Additional Procedure Comments:      GE Voluson P8 transvaginal transducer RIC5-RA with Serial Number [id number] was used during procedure and subsequently cleaned with high level disinfection utilizing the [employer].      Ultrasound performed at:     [facility] [address], [address] [place] [address]  Phone: [phone]  Fax:  [phone]    Sonohysterogram    Date/Time: 9/27/2023 2:00 PM    Performed by: Mimi Maza DO  Authorized by: Mimi Maza DO  Universal Protocol:  Consent given by: patient  Patient identity confirmed: verbally with patient      Pre-procedure:     Prepped with: Betadine    Procedure:     Cervix cleaned and prepped: yes      Uterus sounded: yes      Catheter inserted: yes      Uterine cavity distended with saline: yes    Post-procedure:     Patient observed: yes      Post procedure instructions given to patient: yes      Patient tolerated procedure well with no complications: yes    Comments:      Sonohysterogram demonstrates a polyp within the endometrium.  The possible submucosal fibroid noted on TVS is intramural.   Endometrial biopsy    Date/Time: 9/27/2023 2:00 PM    Performed by: Chucho Steele  Authorized by: Sammie Brittle, DO  Universal Protocol:  Consent given by: patient  Patient understanding: patient states understanding of the procedure being performed  Patient identity confirmed: verbally with patient      Procedure:     Procedure: endometrial biopsy with Pipelle      A bivalve speculum was placed in the vagina: yes      Cervix cleaned and prepped: yes      Specimen collected: specimen collected and sent to pathology      Patient tolerated procedure well with no complications: yes

## 2023-09-27 NOTE — H&P (VIEW-ONLY)
Assessment/Plan:         Diagnoses and all orders for this visit:    Endometrial polyp; patient is admitted for hysteroscopy D&C polypectomy. Procedure and risks reviewed. Abnormal uterine bleeding (AUB)        Subjective:      Patient ID: Grady Foreman is a 41 y.o. female. HPI -0-1-2, spontaneous vaginal delivery x2, prior tubal seen in the office as a new patient on  as a follow-up from emergency room visit. Patient presented to the emergency room complaining of worsening mid lower abdominal pain associated with heavy vaginal bleeding. Her last normal menstrual period prior to this was in . She had an episode of spotting 2 weeks prior to her visit to the emergency room. When she presented to the emergency room spotting had again occurred followed by heavy vaginal bleeding with passage of large clots. Since that visit the pain has subsided.   She was advised to return the office for TVS SIS possible biopsy  Needed                                                           AMB US Pelvic Non OB     Date/Time: 2023 2:00 PM     Performed by: Miguelito Hernandez  Authorized by: Barnabas Lefort, DO  Universal Protocol:  Consent given by: patient  Patient identity confirmed: verbally with patient        Procedure details:     Technique:  Transvaginal US, Non-OB    Position: lithotomy exam    Uterine findings:     Length (cm): 9.06    Height (cm):  4.76    Width (cm):  5.62    Endometrial stripe: identified      Endometrium thickness (mm):  9.09  Left ovary findings:     Left ovary:  Visualized    Length (cm): 4.31    Height (cm): 2.31    Width (cm): 2.83  Right ovary findings:     Right ovary:  Visualized    Length (cm): 3.99    Height (cm): 2.27    Width (cm): 2.72  Other findings:     Free pelvic fluid: not identified      Free peritoneal fluid: not identified    Post-Procedure Details:     Impression:  Anteverted uterus demonstrates an anterior intramural fibroid, 1cm, likely submucosal. The endometrium is echogenic. Otherwise, the uterus an bilateral ovaries appear within normal limits. No free fluid. Tolerance: Tolerated well, no immediate complications    Complications: no complications    Additional Procedure Comments:      GE Voluson P8 transvaginal transducer RIC5-RA with Serial Number [id number] was used during procedure and subsequently cleaned with high level disinfection utilizing the [employer].      Ultrasound performed at:   [address] [facility] [address] [place] Robbins, [address] [place] [address]  Phone: [phone]  Fax:  [phone]     Sonohysterogram     Date/Time: 9/27/2023 2:00 PM     Performed by: Adrián Albert DO  Authorized by: Adrián Albert DO  Universal Protocol:  Consent given by: patient  Patient identity confirmed: verbally with patient        Pre-procedure:     Prepped with: Betadine    Procedure:     Cervix cleaned and prepped: yes      Uterus sounded: yes      Catheter inserted: yes      Uterine cavity distended with saline: yes    Post-procedure:     Patient observed: yes      Post procedure instructions given to patient: yes      Patient tolerated procedure well with no complications: yes    Comments:      Sonohysterogram demonstrates a polyp within the endometrium.  The possible submucosal fibroid noted on TVS is intramural.   Endometrial biopsy     Date/Time: 9/27/2023 2:00 PM     Performed by: Lleia Lizama  Authorized by: Adrián Albert DO  Universal Protocol:  Consent given by: patient  Patient understanding: patient states understanding of the procedure being performed  Patient identity confirmed: verbally with patient        Procedure:     Procedure: endometrial biopsy with Pipelle      A bivalve speculum was placed in the vagina: yes      Cervix cleaned and prepped: yes      Specimen collected: specimen collected and sent to pathology      Patient tolerated procedure well with no complications: yes                    The following portions of the patient's history were reviewed and updated as appropriate:   She  has a past medical history of No known health problems. She   Patient Active Problem List    Diagnosis Date Noted   • Dysfunctional uterine bleeding 07/26/2022     She  has a past surgical history that includes Kidney stone surgery; AUGMENTATION BREAST; Fracture surgery; Carpal tunnel release (Bilateral); Bunionectomy (Right); [place] tooth extraction (Bilateral); and Tubal ligation. Her family history includes COPD in her father; Cervical cancer in her mother; Diabetes in her father; Hyperlipidemia in her father and mother; Hypertension in her father and mother; Kidney failure in her father; Parkinsonism in her father; Thyroid disease in her father. She  reports that she has never smoked. She has never used smokeless tobacco. She reports current alcohol use of about 2.0 standard drinks of alcohol per week. She reports that she does not use drugs. Current Outpatient Medications   Medication Sig Dispense Refill   • naproxen (Naprosyn) 500 mg tablet Take 1 tablet (500 mg total) by mouth 2 (two) times a day with meals 30 tablet 0   • pantoprazole (PROTONIX) 20 mg tablet Take 1 tablet (20 mg total) by mouth daily for 20 days 20 tablet 0     No current facility-administered medications for this visit. Current Outpatient Medications on File Prior to Visit   Medication Sig   • naproxen (Naprosyn) 500 mg tablet Take 1 tablet (500 mg total) by mouth 2 (two) times a day with meals   • pantoprazole (PROTONIX) 20 mg tablet Take 1 tablet (20 mg total) by mouth daily for 20 days     No current facility-administered medications on file prior to visit. She is allergic to vicodin [hydrocodone-acetaminophen]. .    Review of Systems      Objective:      LMP 09/18/2023 Comment: last period prior to this one was june 2023         Physical Exam  Vitals reviewed.    Constitutional: Appearance: Normal appearance. She is normal weight. Cardiovascular:      Rate and Rhythm: Normal rate and regular rhythm. Pulses: Normal pulses. Heart sounds: Normal heart sounds. Pulmonary:      Effort: Pulmonary effort is normal.      Breath sounds: Normal breath sounds. Abdominal:      General: There is no distension. Palpations: Abdomen is soft. There is no mass. Tenderness: There is no abdominal tenderness. There is no guarding or rebound. Hernia: No hernia is present. There is no hernia in the left inguinal area or right inguinal area. Genitourinary:     General: Normal vulva. Labia:         Right: No rash, tenderness or lesion. Left: No rash, tenderness or lesion. Vagina: Normal.      Cervix: Normal.      Uterus: Normal.       Adnexa:         Right: No mass, tenderness or fullness. Left: No mass, tenderness or fullness. Lymphadenopathy:      Lower Body: No right inguinal adenopathy. No left inguinal adenopathy. Neurological:      Mental Status: She is alert.

## 2023-10-04 ENCOUNTER — OFFICE VISIT (OUTPATIENT)
Dept: URGENT CARE | Facility: CLINIC | Age: 43
End: 2023-10-04
Payer: COMMERCIAL

## 2023-10-04 VITALS
BODY MASS INDEX: 23.66 KG/M2 | OXYGEN SATURATION: 99 % | RESPIRATION RATE: 16 BRPM | HEIGHT: 65 IN | WEIGHT: 142 LBS | HEART RATE: 88 BPM | TEMPERATURE: 98.1 F

## 2023-10-04 DIAGNOSIS — H65.02 NON-RECURRENT ACUTE SEROUS OTITIS MEDIA OF LEFT EAR: Primary | ICD-10-CM

## 2023-10-04 LAB — S PYO AG THROAT QL: NEGATIVE

## 2023-10-04 PROCEDURE — 87147 CULTURE TYPE IMMUNOLOGIC: CPT

## 2023-10-04 PROCEDURE — 99213 OFFICE O/P EST LOW 20 MIN: CPT

## 2023-10-04 PROCEDURE — 88305 TISSUE EXAM BY PATHOLOGIST: CPT | Performed by: PATHOLOGY

## 2023-10-04 PROCEDURE — 87070 CULTURE OTHR SPECIMN AEROBIC: CPT

## 2023-10-04 RX ORDER — AMOXICILLIN 500 MG/1
500 TABLET, FILM COATED ORAL 2 TIMES DAILY
Qty: 14 TABLET | Refills: 0 | Status: SHIPPED | OUTPATIENT
Start: 2023-10-04 | End: 2023-10-11

## 2023-10-04 NOTE — PROGRESS NOTES
[facility]        NAME: Chucho Ji is a 41 y.o. female  : 1980    MRN: [id number]  DATE: 2023  TIME: 7:00 PM    Assessment and Plan   Non-recurrent acute serous otitis media of left ear [H65.02]  1. Non-recurrent acute serous otitis media of left ear  POCT rapid strepA    Throat culture    amoxicillin (AMOXIL) 500 MG tablet        Discussed problem with patient. Strep negative but sending for throat culture. Suspicious of left otitis media with congruent sinusitis. Prescribing amoxicillin for this issue. Tylenol ibuprofen for pain. Continue Mucinex. Push fluids. Follow-up with PCP if symptoms not improving report to the ER symptoms worsen. Patient Instructions       Follow up with PCP in 3-5 days. Proceed to  ER if symptoms worsen. Chief Complaint     Chief Complaint   Patient presents with   • Earache     Sore throat x 1 week and left ear pain x 1 day. Covid swab negative x 2          History of Present Illness       Sore throat x 1 week and left ear pain x 1 day. Covid swab negative x 2. Also reports nasal congestion, cough, runny nose. Denies any fevers or chills and has been using Mucinex DM for symptoms. Denies any shortness of breath, wheezing, abdominal complaints, body aches. Earache   Associated symptoms include coughing and a sore throat. Pertinent negatives include no abdominal pain, diarrhea, headaches, rhinorrhea or vomiting. Review of Systems   Review of Systems   Constitutional: Negative for appetite change, chills, fatigue and fever (no tylenol or motrin). HENT: Positive for congestion, ear pain (left ear pain) and sore throat. Negative for postnasal drip, rhinorrhea, sinus pressure, sinus pain and trouble swallowing. Respiratory: Positive for cough. Negative for shortness of breath, wheezing and stridor. Cardiovascular: Negative for chest pain and palpitations.    Gastrointestinal: Negative for abdominal pain, constipation, diarrhea, nausea and vomiting. Musculoskeletal: Negative for myalgias. Neurological: Negative for dizziness, syncope, light-headedness and headaches. Current Medications       Current Outpatient Medications:   •  amoxicillin (AMOXIL) 500 MG tablet, Take 1 tablet (500 mg total) by mouth 2 (two) times a day for 7 days, Disp: 14 tablet, Rfl: 0  •  naproxen (Naprosyn) 500 mg tablet, Take 1 tablet (500 mg total) by mouth 2 (two) times a day with meals, Disp: 30 tablet, Rfl: 0  •  pantoprazole (PROTONIX) 20 mg tablet, Take 1 tablet (20 mg total) by mouth daily for 20 days, Disp: 20 tablet, Rfl: 0    Current Allergies     Allergies as of 10/04/2023 - Reviewed 10/04/2023   Allergen Reaction Noted   • Vicodin [hydrocodone-acetaminophen] GI Intolerance 09/10/2017            The following portions of the patient's history were reviewed and updated as appropriate: allergies, current medications, past family history, past medical history, past social history, past surgical history and problem list.     Past Medical History:   Diagnosis Date   • No known health problems        Past Surgical History:   Procedure Laterality Date   • AUGMENTATION BREAST     • BUNIONECTOMY Right     around 2020   • CARPAL TUNNEL RELEASE Bilateral     july 12th, 2022 - L; R 10/16/22 - R   • FRACTURE SURGERY      1990 R tib/fib   • KIDNEY STONE SURGERY     • TUBAL LIGATION      2018   • WISDOM TOOTH EXTRACTION Bilateral        Family History   Problem Relation Age of Onset   • Thyroid disease Mother    • Hypertension Mother    • Cervical cancer Mother    • Hyperlipidemia Mother    • Diabetes Father    • Kidney failure Father         2/2 Diab   • COPD Father         emphysema   • Parkinsonism Father    • Thyroid disease Father         thyroidectomy - not sure why   • Hypertension Father    • Hyperlipidemia Father          Medications have been verified.         Objective   Pulse 88   Temp 98.1 °F (36.7 °C)   Resp 16   Ht 5' 5" (1.651 m)   Wt 64.4 kg (142 lb)   LMP 09/18/2023 Comment: last period prior to this one was june 2023  SpO2 99%   BMI 23.63 kg/m²        Physical Exam     Physical Exam  Vitals and nursing note reviewed. Constitutional:       General: She is not in acute distress. Appearance: She is normal weight. She is not ill-appearing, toxic-appearing or diaphoretic. HENT:      Head: Normocephalic. Right Ear: Tympanic membrane, ear canal and external ear normal. No middle ear effusion. There is no impacted cerumen. Tympanic membrane is not erythematous or bulging. Left Ear: Ear canal and external ear normal. A middle ear effusion is present. There is no impacted cerumen. Tympanic membrane is erythematous and bulging. Nose: Congestion present. No rhinorrhea. Mouth/Throat:      Mouth: Mucous membranes are moist.      Pharynx: Oropharynx is clear. Posterior oropharyngeal erythema present. No oropharyngeal exudate. Eyes:      General:         Right eye: No discharge. Left eye: No discharge. Extraocular Movements: Extraocular movements intact. Conjunctiva/sclera: Conjunctivae normal.      Pupils: Pupils are equal, round, and reactive to light. Neck:      Vascular: No carotid bruit. Cardiovascular:      Rate and Rhythm: Normal rate and regular rhythm. Pulses: Normal pulses. Heart sounds: Normal heart sounds. No murmur heard. No friction rub. No gallop. Pulmonary:      Effort: Pulmonary effort is normal. No respiratory distress. Breath sounds: Normal breath sounds. No stridor. No wheezing, rhonchi or rales. Chest:      Chest wall: No tenderness. Musculoskeletal:      Cervical back: Normal range of motion and neck supple. No rigidity or tenderness. Lymphadenopathy:      Cervical: No cervical adenopathy. Neurological:      Mental Status: She is alert.

## 2023-10-07 LAB — BACTERIA THROAT CULT: NORMAL

## 2023-10-09 ENCOUNTER — TELEPHONE (OUTPATIENT)
Dept: GYNECOLOGY | Facility: CLINIC | Age: 43
End: 2023-10-09

## 2023-10-09 NOTE — TELEPHONE ENCOUNTER
Intermittent FMLA was scanned and faxed to [address] [place] [address]    AND    Full FMLA was scanned and faxed to [facility]. Same phoneo #.

## 2023-10-10 ENCOUNTER — TELEPHONE (OUTPATIENT)
Dept: GYNECOLOGY | Facility: CLINIC | Age: 43
End: 2023-10-10

## 2023-10-10 NOTE — TELEPHONE ENCOUNTER
Patient called and has surgery this Monday 10/16 and would like to speak with someone regarding questions she has.   Please call her [employer] Pregnancy And Lactation Text: This medication is Pregnancy Category C and isn't considered safe during pregnancy. It is excreted in breast milk.

## 2023-10-11 ENCOUNTER — OFFICE VISIT (OUTPATIENT)
Dept: URGENT CARE | Facility: CLINIC | Age: 43
End: 2023-10-11
Payer: COMMERCIAL

## 2023-10-11 ENCOUNTER — APPOINTMENT (OUTPATIENT)
Dept: LAB | Facility: CLINIC | Age: 43
End: 2023-10-11
Payer: COMMERCIAL

## 2023-10-11 VITALS
OXYGEN SATURATION: 100 % | SYSTOLIC BLOOD PRESSURE: 102 MMHG | WEIGHT: 142 LBS | DIASTOLIC BLOOD PRESSURE: 62 MMHG | RESPIRATION RATE: 16 BRPM | HEART RATE: 80 BPM | HEIGHT: 63 IN | BODY MASS INDEX: 25.16 KG/M2 | TEMPERATURE: 98.1 F

## 2023-10-11 DIAGNOSIS — H69.92 DYSFUNCTION OF LEFT EUSTACHIAN TUBE: Primary | ICD-10-CM

## 2023-10-11 DIAGNOSIS — Z01.818 PRE-OP TESTING: ICD-10-CM

## 2023-10-11 LAB
BASOPHILS # BLD AUTO: 0.06 THOUSANDS/ÂΜL (ref 0–0.1)
BASOPHILS NFR BLD AUTO: 1 % (ref 0–1)
EOSINOPHIL # BLD AUTO: 0.18 THOUSAND/ÂΜL (ref 0–0.61)
EOSINOPHIL NFR BLD AUTO: 2 % (ref 0–6)
ERYTHROCYTE [DISTWIDTH] IN BLOOD BY AUTOMATED COUNT: 12.1 % (ref 11.6–15.1)
HCT VFR BLD AUTO: 42.2 % (ref 34.8–46.1)
HGB BLD-MCNC: 14.1 G/DL (ref 11.5–15.4)
IMM GRANULOCYTES # BLD AUTO: 0.05 THOUSAND/UL (ref 0–0.2)
IMM GRANULOCYTES NFR BLD AUTO: 1 % (ref 0–2)
LYMPHOCYTES # BLD AUTO: 2.09 THOUSANDS/ÂΜL (ref 0.6–4.47)
LYMPHOCYTES NFR BLD AUTO: 21 % (ref 14–44)
MCH RBC QN AUTO: 31 PG (ref 26.8–34.3)
MCHC RBC AUTO-ENTMCNC: 33.4 G/DL (ref 31.4–37.4)
MCV RBC AUTO: 93 FL (ref 82–98)
MONOCYTES # BLD AUTO: 0.64 THOUSAND/ÂΜL (ref 0.17–1.22)
MONOCYTES NFR BLD AUTO: 6 % (ref 4–12)
NEUTROPHILS # BLD AUTO: 7.17 THOUSANDS/ÂΜL (ref 1.85–7.62)
NEUTS SEG NFR BLD AUTO: 69 % (ref 43–75)
NRBC BLD AUTO-RTO: 0 /100 WBCS
PLATELET # BLD AUTO: 378 THOUSANDS/UL (ref 149–390)
PMV BLD AUTO: 9.6 FL (ref 8.9–12.7)
RBC # BLD AUTO: 4.55 MILLION/UL (ref 3.81–5.12)
WBC # BLD AUTO: 10.19 THOUSAND/UL (ref 4.31–10.16)

## 2023-10-11 PROCEDURE — 99213 OFFICE O/P EST LOW 20 MIN: CPT | Performed by: PHYSICIAN ASSISTANT

## 2023-10-11 PROCEDURE — 36415 COLL VENOUS BLD VENIPUNCTURE: CPT

## 2023-10-11 PROCEDURE — 85025 COMPLETE CBC W/AUTO DIFF WBC: CPT

## 2023-10-11 RX ORDER — FLUTICASONE PROPIONATE 50 MCG
1 SPRAY, SUSPENSION (ML) NASAL DAILY
Qty: 16 G | Refills: 0 | Status: SHIPPED | OUTPATIENT
Start: 2023-10-11 | End: 2023-10-19

## 2023-10-11 NOTE — PROGRESS NOTES
[facility]        NAME: Jeremiah Elias is a 41 y.o. female  : 1980    MRN: [id number]  DATE: 2023  TIME: 11:47 AM    Assessment and Plan   Dysfunction of left eustachian tube [H69.92]  1. Dysfunction of left eustachian tube  fluticasone (FLONASE) 50 mcg/act nasal spray            Patient Instructions     Flonase as prescribed  Follow up with PCP in 3-5 days. Proceed to  ER if symptoms worsen. Chief Complaint     Chief Complaint   Patient presents with    Earache     Left ear hurts and feels blocked. Had strep b last week and tx with amoxcillin. History of Present Illness       Earache   There is pain in the left ear. This is a new problem. The current episode started in the past 7 days. The problem has been unchanged. There has been no fever. The pain is mild. Pertinent negatives include no abdominal pain, coughing, diarrhea, ear discharge, headaches, rash, rhinorrhea, sore throat or vomiting. Treatments tried: sudafed, warm/cool compresses, mucinex. Review of Systems   Review of Systems   Constitutional:  Negative for activity change, appetite change, chills, fatigue and fever. HENT:  Positive for ear pain and postnasal drip. Negative for congestion, ear discharge, rhinorrhea, sinus pressure, sinus pain, sneezing, sore throat and trouble swallowing. Respiratory:  Negative for cough, chest tightness, shortness of breath and wheezing. Gastrointestinal:  Negative for abdominal pain, diarrhea, nausea and vomiting. Musculoskeletal:  Negative for myalgias. Skin:  Negative for rash. Neurological:  Negative for light-headedness and headaches.          Current Medications       Current Outpatient Medications:     fluticasone (FLONASE) 50 mcg/act nasal spray, 1 spray into each nostril daily, Disp: 16 g, Rfl: 0    amoxicillin (AMOXIL) 500 MG tablet, Take 1 tablet (500 mg total) by mouth 2 (two) times a day for 7 days, Disp: 14 tablet, Rfl: 0    naproxen (Naprosyn) 500 mg tablet, Take 1 tablet (500 mg total) by mouth 2 (two) times a day with meals, Disp: 30 tablet, Rfl: 0    pantoprazole (PROTONIX) 20 mg tablet, Take 1 tablet (20 mg total) by mouth daily for 20 days, Disp: 20 tablet, Rfl: 0    Current Allergies     Allergies as of 10/11/2023 - Reviewed 10/11/2023   Allergen Reaction Noted    Vicodin [hydrocodone-acetaminophen] GI Intolerance 09/10/2017            The following portions of the patient's history were reviewed and updated as appropriate: allergies, current medications, past family history, past medical history, past social history, past surgical history and problem list.     Past Medical History:   Diagnosis Date    No known health problems        Past Surgical History:   Procedure Laterality Date    AUGMENTATION BREAST      BUNIONECTOMY Right     around [address] [facility] [address] Bilateral     july 12th, 2022 - L; R 10/16/22 - R    FRACTURE SURGERY      1990 R tib/fib    KIDNEY STONE SURGERY      TUBAL LIGATION      2018    WISDOM TOOTH EXTRACTION Bilateral        Family History   Problem Relation Age of Onset    Thyroid disease Mother     Hypertension Mother     Cervical cancer Mother     Hyperlipidemia Mother     Diabetes Father     Kidney failure Father         2/2 Diab    COPD Father         emphysema    Parkinsonism Father     Thyroid disease Father         thyroidectomy - not sure why    Hypertension Father     Hyperlipidemia Father          Medications have been verified. Objective   /62   Pulse 80   Temp 98.1 °F (36.7 °C)   Resp 16   Ht 5' 3" (1.6 m)   Wt 64.4 kg (142 lb)   LMP 09/18/2023 Comment: last period prior to this one was june 2023  SpO2 100%   BMI 25.15 kg/m²   Patient's last menstrual period was 09/18/2023. Physical Exam     Physical Exam  Vitals reviewed. Constitutional:       General: She is not in acute distress. Appearance: She is well-developed. She is not diaphoretic. HENT:      Head: Normocephalic. Right Ear: Tympanic membrane, ear canal and external ear normal.      Left Ear: Ear canal and external ear normal.      Ears:      Comments: L ear effusion      Nose: Nose normal.   Eyes:      Conjunctiva/sclera: Conjunctivae normal.   Cardiovascular:      Rate and Rhythm: Normal rate and regular rhythm. Heart sounds: Normal heart sounds. No murmur heard. No friction rub. No gallop. Pulmonary:      Effort: Pulmonary effort is normal. No respiratory distress. Breath sounds: Normal breath sounds. No wheezing, rhonchi or rales. Skin:     General: Skin is warm. Neurological:      Mental Status: She is alert and oriented to person, place, and time. Psychiatric:         Behavior: Behavior normal.         Thought Content:  Thought content normal.         Judgment: Judgment normal.

## 2023-10-12 ENCOUNTER — ANESTHESIA EVENT (OUTPATIENT)
Dept: PERIOP | Facility: HOSPITAL | Age: 43
End: 2023-10-12
Payer: COMMERCIAL

## 2023-10-12 NOTE — PRE-PROCEDURE INSTRUCTIONS
Pre-Surgery Instructions:   Medication Instructions    fluticasone (FLONASE) 50 mcg/act nasal spray Take night before surgery   See above    . Medication instructions for day surgery reviewed. Please use only a sip of water to take your instructed medications. Avoid all over the counter vitamins, supplements and NSAIDS for one week prior to surgery per anesthesia guidelines. Tylenol is ok to take as needed. You will receive a call one business day prior to surgery with an arrival time and hospital directions. If your surgery is scheduled on a Monday, the hospital will be calling you on the Friday prior to your surgery. If you have not heard from anyone by 8pm, please call the hospital supervisor through the hospital  at [phone]. Jeevan Duffy [phone]). Do not eat or drink anything after midnight the night before your surgery, including candy, mints, lifesavers, or chewing gum. Do not drink alcohol 24hrs before your surgery. Try not to smoke at least 24hrs before your surgery. Follow the pre surgery showering instructions as listed in the [facility] Experience Booklet” or otherwise provided by your surgeon's office. Do not shave the surgical area 24 hours before surgery. Do not apply any lotions, creams, including makeup, cologne, deodorant, or perfumes after showering on the day of your surgery. No contact lenses, eye make-up, or artificial eyelashes. Remove nail polish, including gel polish, and any artificial, gel, or acrylic nails if possible. Remove all jewelry including rings and body piercing jewelry. Wear causal clothing that is easy to take on and off. Consider your type of surgery. Keep any valuables, jewelry, piercings at home. Please bring any specially ordered equipment (sling, braces) if indicated. Arrange for a responsible person to drive you to and from the hospital on the day of your surgery. Visitor Guidelines discussed.      Call the surgeon's office with any new illnesses, exposures, or additional questions prior to surgery. Please reference your [facility] Surgical Experience Booklet” for additional information to prepare for your upcoming surgery.

## 2023-10-16 ENCOUNTER — HOSPITAL ENCOUNTER (OUTPATIENT)
Facility: HOSPITAL | Age: 43
Setting detail: OUTPATIENT SURGERY
Discharge: HOME | End: 2023-10-16
Attending: OBSTETRICS & GYNECOLOGY | Admitting: OBSTETRICS & GYNECOLOGY
Payer: COMMERCIAL

## 2023-10-16 ENCOUNTER — ANESTHESIA (OUTPATIENT)
Dept: PERIOP | Facility: HOSPITAL | Age: 43
End: 2023-10-16
Payer: COMMERCIAL

## 2023-10-16 VITALS
BODY MASS INDEX: 23.91 KG/M2 | HEIGHT: 63 IN | OXYGEN SATURATION: 100 % | SYSTOLIC BLOOD PRESSURE: 106 MMHG | WEIGHT: 134.92 LBS | TEMPERATURE: 98.2 F | RESPIRATION RATE: 16 BRPM | HEART RATE: 70 BPM | DIASTOLIC BLOOD PRESSURE: 57 MMHG

## 2023-10-16 DIAGNOSIS — N93.9 ABNORMAL UTERINE BLEEDING: ICD-10-CM

## 2023-10-16 DIAGNOSIS — N84.0 ENDOMETRIAL POLYP: ICD-10-CM

## 2023-10-16 PROBLEM — Z98.890 STATUS POST HYSTEROSCOPY: Status: ACTIVE | Noted: 2023-10-16

## 2023-10-16 PROBLEM — F41.9 ANXIETY: Status: ACTIVE | Noted: 2023-10-16

## 2023-10-16 PROBLEM — R12 HEARTBURN: Status: ACTIVE | Noted: 2023-10-16

## 2023-10-16 LAB
EXT PREGNANCY TEST URINE: NEGATIVE
EXT. CONTROL: ABNORMAL

## 2023-10-16 PROCEDURE — 88305 TISSUE EXAM BY PATHOLOGIST: CPT | Performed by: PATHOLOGY

## 2023-10-16 PROCEDURE — 81025 URINE PREGNANCY TEST: CPT | Performed by: OBSTETRICS & GYNECOLOGY

## 2023-10-16 RX ORDER — SODIUM CHLORIDE, SODIUM LACTATE, POTASSIUM CHLORIDE, CALCIUM CHLORIDE 600; 310; 30; 20 MG/100ML; MG/100ML; MG/100ML; MG/100ML
125 INJECTION, SOLUTION INTRAVENOUS CONTINUOUS
Status: DISCONTINUED | OUTPATIENT
Start: 2023-10-16 | End: 2023-10-16

## 2023-10-16 RX ORDER — LIDOCAINE HYDROCHLORIDE 20 MG/ML
INJECTION, SOLUTION EPIDURAL; INFILTRATION; INTRACAUDAL; PERINEURAL AS NEEDED
Status: DISCONTINUED | OUTPATIENT
Start: 2023-10-16 | End: 2023-10-16

## 2023-10-16 RX ORDER — SODIUM CHLORIDE 9 MG/ML
INJECTION, SOLUTION INTRAVENOUS AS NEEDED
Status: DISCONTINUED | OUTPATIENT
Start: 2023-10-16 | End: 2023-10-16 | Stop reason: HOSPADM

## 2023-10-16 RX ORDER — PROPOFOL 10 MG/ML
INJECTION, EMULSION INTRAVENOUS AS NEEDED
Status: DISCONTINUED | OUTPATIENT
Start: 2023-10-16 | End: 2023-10-16

## 2023-10-16 RX ORDER — DEXAMETHASONE SODIUM PHOSPHATE 10 MG/ML
INJECTION, SOLUTION INTRAMUSCULAR; INTRAVENOUS AS NEEDED
Status: DISCONTINUED | OUTPATIENT
Start: 2023-10-16 | End: 2023-10-16

## 2023-10-16 RX ORDER — FENTANYL CITRATE 50 UG/ML
INJECTION, SOLUTION INTRAMUSCULAR; INTRAVENOUS AS NEEDED
Status: DISCONTINUED | OUTPATIENT
Start: 2023-10-16 | End: 2023-10-16

## 2023-10-16 RX ORDER — ACETAMINOPHEN 325 MG/1
975 TABLET ORAL EVERY 6 HOURS PRN
Status: DISCONTINUED | OUTPATIENT
Start: 2023-10-16 | End: 2023-10-16 | Stop reason: HOSPADM

## 2023-10-16 RX ORDER — FENTANYL CITRATE/PF 50 MCG/ML
25 SYRINGE (ML) INJECTION
Status: DISCONTINUED | OUTPATIENT
Start: 2023-10-16 | End: 2023-10-16 | Stop reason: HOSPADM

## 2023-10-16 RX ORDER — IBUPROFEN 600 MG/1
600 TABLET ORAL EVERY 6 HOURS PRN
Status: DISCONTINUED | OUTPATIENT
Start: 2023-10-16 | End: 2023-10-16 | Stop reason: HOSPADM

## 2023-10-16 RX ORDER — MIDAZOLAM HYDROCHLORIDE 2 MG/2ML
INJECTION, SOLUTION INTRAMUSCULAR; INTRAVENOUS AS NEEDED
Status: DISCONTINUED | OUTPATIENT
Start: 2023-10-16 | End: 2023-10-16

## 2023-10-16 RX ORDER — ONDANSETRON 2 MG/ML
INJECTION INTRAMUSCULAR; INTRAVENOUS AS NEEDED
Status: DISCONTINUED | OUTPATIENT
Start: 2023-10-16 | End: 2023-10-16

## 2023-10-16 RX ORDER — MEPERIDINE HYDROCHLORIDE 25 MG/ML
12.5 INJECTION INTRAMUSCULAR; INTRAVENOUS; SUBCUTANEOUS
Status: DISCONTINUED | OUTPATIENT
Start: 2023-10-16 | End: 2023-10-16 | Stop reason: HOSPADM

## 2023-10-16 RX ORDER — ONDANSETRON 2 MG/ML
4 INJECTION INTRAMUSCULAR; INTRAVENOUS ONCE AS NEEDED
Status: DISCONTINUED | OUTPATIENT
Start: 2023-10-16 | End: 2023-10-16 | Stop reason: HOSPADM

## 2023-10-16 RX ADMIN — FENTANYL CITRATE 50 MCG: 50 INJECTION INTRAMUSCULAR; INTRAVENOUS at 13:12

## 2023-10-16 RX ADMIN — DEXAMETHASONE SODIUM PHOSPHATE 10 MG: 10 INJECTION INTRAMUSCULAR; INTRAVENOUS at 13:16

## 2023-10-16 RX ADMIN — SODIUM CHLORIDE, SODIUM LACTATE, POTASSIUM CHLORIDE, AND CALCIUM CHLORIDE: .6; .31; .03; .02 INJECTION, SOLUTION INTRAVENOUS at 12:57

## 2023-10-16 RX ADMIN — MIDAZOLAM 2 MG: 1 INJECTION INTRAMUSCULAR; INTRAVENOUS at 12:53

## 2023-10-16 RX ADMIN — PROPOFOL 200 MG: 10 INJECTION, EMULSION INTRAVENOUS at 13:04

## 2023-10-16 RX ADMIN — LIDOCAINE HYDROCHLORIDE 100 MG: 20 INJECTION, SOLUTION EPIDURAL; INFILTRATION; INTRACAUDAL at 13:04

## 2023-10-16 RX ADMIN — ONDANSETRON 4 MG: 2 INJECTION INTRAMUSCULAR; INTRAVENOUS at 13:22

## 2023-10-16 RX ADMIN — IBUPROFEN 600 MG: 600 TABLET ORAL at 14:37

## 2023-10-16 RX ADMIN — SODIUM CHLORIDE, SODIUM LACTATE, POTASSIUM CHLORIDE, AND CALCIUM CHLORIDE 125 ML/HR: .6; .31; .03; .02 INJECTION, SOLUTION INTRAVENOUS at 09:07

## 2023-10-16 RX ADMIN — FENTANYL CITRATE 50 MCG: 50 INJECTION INTRAMUSCULAR; INTRAVENOUS at 13:04

## 2023-10-16 NOTE — ANESTHESIA PREPROCEDURE EVALUATION
Procedure:  D&C; HYSTEROSCOPY; POLYPECTOMY (Uterus)    Relevant Problems   NEURO/PSYCH   (+) Anxiety      Genitourinary   (+) Dysfunctional uterine bleeding      Other   (+) Heartburn        Physical Exam    Airway    Mallampati score: II  TM Distance: >3 FB  Neck ROM: full     Dental   No notable dental hx     Cardiovascular  Rhythm: regular, Rate: normal, Cardiovascular exam normal    Pulmonary  Pulmonary exam normal Breath sounds clear to auscultation    Other Findings      Anesthesia Plan  ASA Score- 2     Anesthesia Type- general with ASA Monitors. Additional Monitors:     Airway Plan: LMA. Plan Factors-    Chart reviewed. Existing labs reviewed. Patient summary reviewed. Patient is not a current smoker. Patient not instructed to abstain from smoking on day of procedure. Patient did not smoke on day of surgery. There is medical exclusion for perioperative obstructive sleep apnea risk education. Induction- intravenous. Postoperative Plan-     Informed Consent- Anesthetic plan and risks discussed with patient and spouse Jodi Aviles).

## 2023-10-16 NOTE — ANESTHESIA POSTPROCEDURE EVALUATION
Post-Op Assessment Note    CV Status:  Stable  Pain Score: 1    Pain management: adequate     Mental Status:  Alert and awake   Hydration Status:  Euvolemic   PONV Controlled:  Controlled   Airway Patency:  Patent      Post Op Vitals Reviewed: Yes      Staff: Anesthesiologist         No notable events documented.     BP      Temp      Pulse     Resp      SpO2

## 2023-10-16 NOTE — OP NOTE
OPERATIVE REPORT  PATIENT NAME: Hui Manzanares    :  1980  MRN: [id number]  Pt Location: [facility] OR ROOM 01    SURGERY DATE: 10/16/2023      Surgeon(s) and Role:     Julianne Swann DO - Primary     * Agata Rivera MD - Assisting    Preop Diagnosis:  Endometrial polyp [N84.0]  Abnormal uterine bleeding [N93.9]    Post-Op Diagnosis Codes:     * Endometrial polyp [N84.0]     * Abnormal uterine bleeding [N93.9]    Procedure(s):  D&C; HYSTEROSCOPY; POLYPECTOMY    Specimen(s):  ID Type Source Tests Collected by Time Destination   1 : Endometrial Curretings and polyp Tissue Endometrium TISSUE EXAM Julianne Swann DO 10/16/2023 1323        Estimated Blood Loss:   Minimal    Drains:  None    Anesthesia Type:   General LMA    Operative Indications:  Endometrial polyp [N84.0]  Abnormal uterine bleeding [N93.9]    Operative Findings:  1. External genitalia grossly normal in appearance. No ulcerations, no lacerations, no lesions. Bimanual exam revealed anteverted uterus with normal contours and freely mobile. No adnexal masses palpated bilaterally. 2.  Vagina and cervix were grossly normal in appearance without any lacerations or lesions. 3. Uterus sounded to 9 cm. 4. Polyp was noted at internal os at 4 o'clock. Complications:   None    Procedure and Technique:  The patient was taken to the operating room where a time out was performed to confirm correct patient and correct procedure. General LMA anesthesia (LMA) was administered and the patient was positioned on the OR table in the dorsal lithotomy position. All pressure points were padded and a delmy hugger was placed to maintain control of core body temperature. A bimanual exam was performed and the uterus was noted to be anteverted, normal in size and consistency with no palpable adnexal masses or fullness. The patient was prepped and draped in the usual sterile fashion.     A straight catheter was introduced into the bladder, which was drained of 10cc of clear yellow urine. A weighted speculum was inserted into the vagina and a right-angle was used to visualize the anterior lip of the cervix, which was then grasped with a single toothed tenaculum. The uterus was sounded to 9cm. The cervix was serially dilated to 14F using Hanks dilators for introduction of the hysteroscope. Hysteroscope was introduced under direct visualization using normal saline solution as the distention media. Hysteroscope was advanced to the uterine fundus and the entire uterine cavity was inspected in a systematic manner. There was noted to be the above findings. Hysteroscope was withdrawn and sharp curetting was performed, starting at the 12'oclock position and rotating a total of 360 degrees to cover all surfaces. Endometrial tissue and polypoid tissue was obtained and sent for pathology. The single toothed tenaculum was removed from the anterior lip of the cervix. Good hemostasis was confirmed at the tenaculum puncture sites. Weighted speculum was then removed from the vagina. At the conclusion of the procedure, all needle, sponge, and instrument counts were noted to be correct x2. Dr. Roxana Fernández was present and participated in all key portions of the case.     Patient Disposition:  PACU         SIGNATURE: Iesha Sutherland MD  DATE: October 16, 2023  TIME: 1:35 PM

## 2023-10-18 PROCEDURE — 88305 TISSUE EXAM BY PATHOLOGIST: CPT | Performed by: PATHOLOGY

## 2023-10-19 ENCOUNTER — HOSPITAL ENCOUNTER (EMERGENCY)
Facility: HOSPITAL | Age: 43
Discharge: HOME | End: 2023-10-19
Attending: STUDENT IN AN ORGANIZED HEALTH CARE EDUCATION/TRAINING PROGRAM
Payer: COMMERCIAL

## 2023-10-19 ENCOUNTER — APPOINTMENT (EMERGENCY)
Dept: ULTRASOUND IMAGING | Facility: HOSPITAL | Age: 43
End: 2023-10-19
Payer: COMMERCIAL

## 2023-10-19 ENCOUNTER — APPOINTMENT (EMERGENCY)
Dept: CT IMAGING | Facility: HOSPITAL | Age: 43
End: 2023-10-19
Payer: COMMERCIAL

## 2023-10-19 VITALS
TEMPERATURE: 99.1 F | SYSTOLIC BLOOD PRESSURE: 106 MMHG | OXYGEN SATURATION: 100 % | HEIGHT: 63 IN | DIASTOLIC BLOOD PRESSURE: 56 MMHG | HEART RATE: 84 BPM | RESPIRATION RATE: 16 BRPM | WEIGHT: 139.33 LBS | BODY MASS INDEX: 24.69 KG/M2

## 2023-10-19 DIAGNOSIS — E87.6 HYPOKALEMIA: ICD-10-CM

## 2023-10-19 DIAGNOSIS — R10.30 LOWER ABDOMINAL PAIN: Primary | ICD-10-CM

## 2023-10-19 DIAGNOSIS — N71.9 ENDOMETRITIS: ICD-10-CM

## 2023-10-19 LAB
ALBUMIN SERPL BCP-MCNC: 4.5 G/DL (ref 3.5–5)
ALP SERPL-CCNC: 70 U/L (ref 34–104)
ALT SERPL W P-5'-P-CCNC: 15 U/L (ref 7–52)
ANION GAP SERPL CALCULATED.3IONS-SCNC: 6 MMOL/L
AST SERPL W P-5'-P-CCNC: 11 U/L (ref 13–39)
BACTERIA UR QL AUTO: NORMAL /HPF
BASOPHILS # BLD AUTO: 0.05 THOUSANDS/ÂΜL (ref 0–0.1)
BASOPHILS NFR BLD AUTO: 1 % (ref 0–1)
BILIRUB SERPL-MCNC: 0.56 MG/DL (ref 0.2–1)
BILIRUB UR QL STRIP: NEGATIVE
BUN SERPL-MCNC: 11 MG/DL (ref 5–25)
CALCIUM SERPL-MCNC: 8.9 MG/DL (ref 8.4–10.2)
CHLORIDE SERPL-SCNC: 103 MMOL/L (ref 96–108)
CLARITY UR: CLEAR
CO2 SERPL-SCNC: 29 MMOL/L (ref 21–32)
COLOR UR: YELLOW
CREAT SERPL-MCNC: 0.74 MG/DL (ref 0.6–1.3)
EOSINOPHIL # BLD AUTO: 0.2 THOUSAND/ÂΜL (ref 0–0.61)
EOSINOPHIL NFR BLD AUTO: 2 % (ref 0–6)
ERYTHROCYTE [DISTWIDTH] IN BLOOD BY AUTOMATED COUNT: 12.1 % (ref 11.6–15.1)
EXT PREGNANCY TEST URINE: NEGATIVE
EXT. CONTROL: NORMAL
GFR SERPL CREATININE-BSD FRML MDRD: 100 ML/MIN/1.73SQ M
GLUCOSE SERPL-MCNC: 108 MG/DL (ref 65–140)
GLUCOSE UR STRIP-MCNC: NEGATIVE MG/DL
HCT VFR BLD AUTO: 41.4 % (ref 34.8–46.1)
HGB BLD-MCNC: 13.6 G/DL (ref 11.5–15.4)
HGB UR QL STRIP.AUTO: ABNORMAL
IMM GRANULOCYTES # BLD AUTO: 0.07 THOUSAND/UL (ref 0–0.2)
IMM GRANULOCYTES NFR BLD AUTO: 1 % (ref 0–2)
KETONES UR STRIP-MCNC: NEGATIVE MG/DL
LACTATE SERPL-SCNC: 1.2 MMOL/L (ref 0.5–2)
LEUKOCYTE ESTERASE UR QL STRIP: NEGATIVE
LYMPHOCYTES # BLD AUTO: 2.03 THOUSANDS/ÂΜL (ref 0.6–4.47)
LYMPHOCYTES NFR BLD AUTO: 19 % (ref 14–44)
MCH RBC QN AUTO: 30.1 PG (ref 26.8–34.3)
MCHC RBC AUTO-ENTMCNC: 32.9 G/DL (ref 31.4–37.4)
MCV RBC AUTO: 92 FL (ref 82–98)
MONOCYTES # BLD AUTO: 0.7 THOUSAND/ÂΜL (ref 0.17–1.22)
MONOCYTES NFR BLD AUTO: 6 % (ref 4–12)
NEUTROPHILS # BLD AUTO: 7.89 THOUSANDS/ÂΜL (ref 1.85–7.62)
NEUTS SEG NFR BLD AUTO: 71 % (ref 43–75)
NITRITE UR QL STRIP: NEGATIVE
NON-SQ EPI CELLS URNS QL MICRO: NORMAL /HPF
NRBC BLD AUTO-RTO: 0 /100 WBCS
PH UR STRIP.AUTO: 7.5 [PH]
PLATELET # BLD AUTO: 314 THOUSANDS/UL (ref 149–390)
PMV BLD AUTO: 8.8 FL (ref 8.9–12.7)
POTASSIUM SERPL-SCNC: 3.3 MMOL/L (ref 3.5–5.3)
PROT SERPL-MCNC: 7.7 G/DL (ref 6.4–8.4)
PROT UR STRIP-MCNC: NEGATIVE MG/DL
RBC # BLD AUTO: 4.52 MILLION/UL (ref 3.81–5.12)
RBC #/AREA URNS AUTO: NORMAL /HPF
SODIUM SERPL-SCNC: 138 MMOL/L (ref 135–147)
SP GR UR STRIP.AUTO: 1.01 (ref 1–1.03)
UROBILINOGEN UR QL STRIP.AUTO: 0.2 E.U./DL
WBC # BLD AUTO: 10.94 THOUSAND/UL (ref 4.31–10.16)
WBC #/AREA URNS AUTO: NORMAL /HPF

## 2023-10-19 PROCEDURE — 36415 COLL VENOUS BLD VENIPUNCTURE: CPT | Performed by: PHYSICIAN ASSISTANT

## 2023-10-19 PROCEDURE — 83605 ASSAY OF LACTIC ACID: CPT | Performed by: PHYSICIAN ASSISTANT

## 2023-10-19 PROCEDURE — 76856 US EXAM PELVIC COMPLETE: CPT

## 2023-10-19 PROCEDURE — 85025 COMPLETE CBC W/AUTO DIFF WBC: CPT | Performed by: PHYSICIAN ASSISTANT

## 2023-10-19 PROCEDURE — 74177 CT ABD & PELVIS W/CONTRAST: CPT

## 2023-10-19 PROCEDURE — 81001 URINALYSIS AUTO W/SCOPE: CPT | Performed by: PHYSICIAN ASSISTANT

## 2023-10-19 PROCEDURE — G1004 CDSM NDSC: HCPCS

## 2023-10-19 PROCEDURE — 80053 COMPREHEN METABOLIC PANEL: CPT | Performed by: PHYSICIAN ASSISTANT

## 2023-10-19 PROCEDURE — 81025 URINE PREGNANCY TEST: CPT | Performed by: PHYSICIAN ASSISTANT

## 2023-10-19 RX ORDER — CLINDAMYCIN HYDROCHLORIDE 300 MG/1
300 CAPSULE ORAL 4 TIMES DAILY
Qty: 28 CAPSULE | Refills: 0 | Status: SHIPPED | OUTPATIENT
Start: 2023-10-19 | End: 2023-10-19 | Stop reason: CLARIF

## 2023-10-19 RX ORDER — MORPHINE SULFATE 4 MG/ML
4 INJECTION, SOLUTION INTRAMUSCULAR; INTRAVENOUS ONCE
Status: DISCONTINUED | OUTPATIENT
Start: 2023-10-19 | End: 2023-10-19

## 2023-10-19 RX ORDER — POTASSIUM CHLORIDE 750 MG/1
20 TABLET, EXTENDED RELEASE ORAL 2 TIMES DAILY
Qty: 20 TABLET | Refills: 0 | Status: SHIPPED | OUTPATIENT
Start: 2023-10-19 | End: 2023-10-24

## 2023-10-19 RX ORDER — CLINDAMYCIN PHOSPHATE 600 MG/50ML
600 INJECTION INTRAVENOUS ONCE
Status: DISCONTINUED | OUTPATIENT
Start: 2023-10-19 | End: 2023-10-19

## 2023-10-19 RX ORDER — NAPROXEN 500 MG/1
500 TABLET ORAL 2 TIMES DAILY WITH MEALS
Qty: 14 TABLET | Refills: 0 | Status: SHIPPED | OUTPATIENT
Start: 2023-10-19 | End: 2023-10-26

## 2023-10-19 RX ORDER — AMOXICILLIN AND CLAVULANATE POTASSIUM 875; 125 MG/1; MG/1
1 TABLET, FILM COATED ORAL EVERY 12 HOURS
Qty: 14 TABLET | Refills: 0 | Status: SHIPPED | OUTPATIENT
Start: 2023-10-19 | End: 2023-10-26

## 2023-10-19 RX ORDER — AMOXICILLIN AND CLAVULANATE POTASSIUM 875; 125 MG/1; MG/1
1 TABLET, FILM COATED ORAL ONCE
Status: COMPLETED | OUTPATIENT
Start: 2023-10-19 | End: 2023-10-19

## 2023-10-19 RX ORDER — POTASSIUM CHLORIDE 20 MEQ/1
40 TABLET, EXTENDED RELEASE ORAL ONCE
Status: COMPLETED | OUTPATIENT
Start: 2023-10-19 | End: 2023-10-19

## 2023-10-19 RX ORDER — MORPHINE SULFATE 4 MG/ML
4 INJECTION, SOLUTION INTRAMUSCULAR; INTRAVENOUS ONCE
Status: COMPLETED | OUTPATIENT
Start: 2023-10-19 | End: 2023-10-19

## 2023-10-19 RX ORDER — KETOROLAC TROMETHAMINE 30 MG/ML
15 INJECTION, SOLUTION INTRAMUSCULAR; INTRAVENOUS ONCE
Status: COMPLETED | OUTPATIENT
Start: 2023-10-19 | End: 2023-10-19

## 2023-10-19 RX ADMIN — AMOXICILLIN AND CLAVULANATE POTASSIUM 1 TABLET: 875; 125 TABLET, FILM COATED ORAL at 21:16

## 2023-10-19 RX ADMIN — KETOROLAC TROMETHAMINE 15 MG: 30 INJECTION, SOLUTION INTRAMUSCULAR; INTRAVENOUS at 20:57

## 2023-10-19 RX ADMIN — MORPHINE SULFATE 4 MG: 4 INJECTION INTRAVENOUS at 18:13

## 2023-10-19 RX ADMIN — SODIUM CHLORIDE 1000 ML: 0.9 INJECTION, SOLUTION INTRAVENOUS at 18:12

## 2023-10-19 RX ADMIN — IOHEXOL 100 ML: 350 INJECTION, SOLUTION INTRAVENOUS at 18:58

## 2023-10-19 RX ADMIN — AMPICILLIN SODIUM AND SULBACTAM SODIUM 3 G: 2; 1 INJECTION, POWDER, FOR SOLUTION INTRAMUSCULAR; INTRAVENOUS at 21:35

## 2023-10-19 RX ADMIN — POTASSIUM CHLORIDE 40 MEQ: 1500 TABLET, EXTENDED RELEASE ORAL at 21:11
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
## 2023-10-20 NOTE — ED ATTENDING ATTESTATION
10/19/2023  IAditya DO, saw and evaluated the patient. I have discussed the patient with the resident/non-physician practitioner and agree with the resident's/non-physician practitioner's findings, Plan of Care, and MDM as documented in the resident's/non-physician practitioner's note, except where noted. All available labs and Radiology studies were reviewed. I was present for key portions of any procedure(s) performed by the resident/non-physician practitioner and I was immediately available to provide assistance. At this point I agree with the current assessment done in the Emergency Department. I have conducted an independent evaluation of this patient a history and physical is as follows:    29-year-old female. Status post D&C on Monday. Worsening abdominal pain, low-grade fever. CT imaging equivocal for endometritis. Case discussed with gynecology. Transabdominal ultrasound interpretation below. IV Unasyn administered. The patient was prescribed a course of Augmentin. Will follow up with gynecology in the AM. See Ms. Lyman's note for further details. Return precautions discussed. Stable for discharge. ED Course  ED Course as of 10/20/23 0009   Thu Oct 19, 2023   4837 Transabdominal US: Small amount of complex fluid with internal debris noted within the endometrial cavity. Endometrial thickness within normal limits.          Critical Care Time  Procedures

## 2023-10-20 NOTE — DISCHARGE INSTRUCTIONS
Please call and follow up with your ob/gyn as discussed    Please continue medications as discussed.

## 2023-10-24 ENCOUNTER — OFFICE VISIT (OUTPATIENT)
Dept: GYNECOLOGY | Facility: CLINIC | Age: 43
End: 2023-10-24

## 2023-10-24 VITALS — DIASTOLIC BLOOD PRESSURE: 60 MMHG | SYSTOLIC BLOOD PRESSURE: 98 MMHG | WEIGHT: 139 LBS | BODY MASS INDEX: 24.62 KG/M2

## 2023-10-24 DIAGNOSIS — Z48.89 POSTOPERATIVE VISIT: Primary | ICD-10-CM

## 2023-10-24 PROCEDURE — 99024 POSTOP FOLLOW-UP VISIT: CPT | Performed by: OBSTETRICS & GYNECOLOGY

## 2023-10-24 NOTE — PROGRESS NOTES
Patient presents for postoperative check. She status post hysteroscopy D&C polypectomy. She presented to the emergency room a few days following the procedure with a fever and lower abdominal cramping. She was diagnosed with endometritis. She was discharged home on Augmentin and naproxen. She is doing well since the emergency room visit with minimal discomfort. Physical exam: Uterus is anteverted normal size and contour freely mobile slightly tender. No palpable adnexal masses or tenderness. Cervix is closed and thick. Path report endometrial tissue benign.     Impression: Normal postoperative check    Plan: Return to the office as needed/annual

## 2023-10-27 ENCOUNTER — TELEPHONE (OUTPATIENT)
Dept: GYNECOLOGY | Facility: CLINIC | Age: 43
End: 2023-10-27

## 2023-10-27 DIAGNOSIS — N93.8 DYSFUNCTIONAL UTERINE BLEEDING: Primary | ICD-10-CM

## 2023-10-27 RX ORDER — DOXYCYCLINE HYCLATE 100 MG/1
100 CAPSULE ORAL EVERY 12 HOURS SCHEDULED
Qty: 14 CAPSULE | Refills: 0 | Status: SHIPPED | OUTPATIENT
Start: 2023-10-27 | End: 2023-11-03

## 2023-10-27 RX ORDER — NAPROXEN SODIUM 550 MG/1
550 TABLET ORAL 2 TIMES DAILY WITH MEALS
Qty: 30 TABLET | Refills: 0 | Status: SHIPPED | OUTPATIENT
Start: 2023-10-27 | End: 2023-11-11

## 2023-10-27 RX ORDER — MEDROXYPROGESTERONE ACETATE 10 MG/1
10 TABLET ORAL DAILY
Qty: 10 TABLET | Refills: 0 | Status: SHIPPED | OUTPATIENT
Start: 2023-10-27 | End: 2023-11-06

## 2023-10-27 NOTE — TELEPHONE ENCOUNTER
PT called - she had surgery on 10/17 and her post op appt on 10/24. Pt states that she is still bleeding heavily, filling a pad every hour, and now having lower abdominal pain, nausea. Her temperature is 99.4. Please advise your recommendations. Thank you.

## 2024-01-12 ENCOUNTER — TELEPHONE (OUTPATIENT)
Dept: GYNECOLOGY | Facility: CLINIC | Age: 44
End: 2024-01-12

## 2024-01-12 NOTE — TELEPHONE ENCOUNTER
Pt was able to get in sooner with her PCP and will have her do a breast check.  Pt has an appt with her this Tuesday.       ----- Message from LEOBARDO Mcintosh sent at 1/12/2024 12:15 PM EST -----  Regarding: FW: Breast Pain  Contact: [phone]  Pls have pt come in for breast exam      ----- Message -----  From: Lizzie Neff  Sent: 1/12/2024  11:38 AM EST  To: LEOBARDO Mcintosh  Subject: FW: Breast Pain                                    ----- Message -----  From: Chelo Boykin  Sent: 1/12/2024  11:11 AM EST  To: [facility]  Subject: Breast Pain                                      Hi, I am not sure if I should reach out to your or my family doctor. I started last month with some pain in my left breast it feels like a pulling and pinching sensation and pain into my armpit at times. On my last mammogram there was a small nodule in that same area, and I was sent for an ultrasound of the breast.     Kindly,  Chelo

## 2024-01-16 ENCOUNTER — OFFICE VISIT (OUTPATIENT)
Dept: FAMILY MEDICINE CLINIC | Facility: CLINIC | Age: 44
End: 2024-01-16
Payer: COMMERCIAL

## 2024-01-16 VITALS
SYSTOLIC BLOOD PRESSURE: 104 MMHG | BODY MASS INDEX: 23.91 KG/M2 | DIASTOLIC BLOOD PRESSURE: 68 MMHG | TEMPERATURE: 98.7 F | HEART RATE: 83 BPM | OXYGEN SATURATION: 98 % | WEIGHT: 135 LBS

## 2024-01-16 DIAGNOSIS — E78.00 ELEVATED LDL CHOLESTEROL LEVEL: ICD-10-CM

## 2024-01-16 DIAGNOSIS — F41.9 ANXIETY: ICD-10-CM

## 2024-01-16 DIAGNOSIS — Z00.00 ANNUAL PHYSICAL EXAM: Primary | ICD-10-CM

## 2024-01-16 DIAGNOSIS — Z13.6 SCREENING FOR CARDIOVASCULAR CONDITION: ICD-10-CM

## 2024-01-16 DIAGNOSIS — Z13.1 SCREENING FOR DIABETES MELLITUS: ICD-10-CM

## 2024-01-16 PROCEDURE — 99396 PREV VISIT EST AGE 40-64: CPT | Performed by: FAMILY MEDICINE
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
## 2024-01-16 NOTE — ASSESSMENT & PLAN NOTE
Recently worsening. Not interested in pharmacologic mgmt at this time. We discussed importance of establishing with regular therapy and I recommended daily meditation and relaxation exercises. I provided her a handout on this as well. Patient will call or reach out if symptoms worsen or do not improve.

## 2024-01-22 ENCOUNTER — APPOINTMENT (OUTPATIENT)
Dept: LAB | Facility: CLINIC | Age: 44
End: 2024-01-22
Payer: COMMERCIAL

## 2024-01-22 DIAGNOSIS — E78.00 ELEVATED LDL CHOLESTEROL LEVEL: ICD-10-CM

## 2024-01-22 DIAGNOSIS — Z13.1 SCREENING FOR DIABETES MELLITUS: ICD-10-CM

## 2024-01-22 DIAGNOSIS — Z13.6 SCREENING FOR CARDIOVASCULAR CONDITION: ICD-10-CM

## 2024-01-22 LAB
ALBUMIN SERPL BCP-MCNC: 4.3 G/DL (ref 3.5–5)
ALP SERPL-CCNC: 52 U/L (ref 34–104)
ALT SERPL W P-5'-P-CCNC: 14 U/L (ref 7–52)
ANION GAP SERPL CALCULATED.3IONS-SCNC: 6 MMOL/L
AST SERPL W P-5'-P-CCNC: 25 U/L (ref 13–39)
BASOPHILS # BLD AUTO: 0.06 THOUSANDS/ÂΜL (ref 0–0.1)
BASOPHILS NFR BLD AUTO: 1 % (ref 0–1)
BILIRUB SERPL-MCNC: 0.78 MG/DL (ref 0.2–1)
BUN SERPL-MCNC: 13 MG/DL (ref 5–25)
CALCIUM SERPL-MCNC: 8.9 MG/DL (ref 8.4–10.2)
CHLORIDE SERPL-SCNC: 106 MMOL/L (ref 96–108)
CHOLEST SERPL-MCNC: 172 MG/DL
CO2 SERPL-SCNC: 28 MMOL/L (ref 21–32)
CREAT SERPL-MCNC: 0.73 MG/DL (ref 0.6–1.3)
EOSINOPHIL # BLD AUTO: 0.17 THOUSAND/ÂΜL (ref 0–0.61)
EOSINOPHIL NFR BLD AUTO: 3 % (ref 0–6)
ERYTHROCYTE [DISTWIDTH] IN BLOOD BY AUTOMATED COUNT: 12.3 % (ref 11.6–15.1)
GFR SERPL CREATININE-BSD FRML MDRD: 102 ML/MIN/1.73SQ M
GLUCOSE P FAST SERPL-MCNC: 97 MG/DL (ref 65–99)
HCT VFR BLD AUTO: 44 % (ref 34.8–46.1)
HDLC SERPL-MCNC: 40 MG/DL
HGB BLD-MCNC: 14.2 G/DL (ref 11.5–15.4)
IMM GRANULOCYTES # BLD AUTO: 0.01 THOUSAND/UL (ref 0–0.2)
IMM GRANULOCYTES NFR BLD AUTO: 0 % (ref 0–2)
LDLC SERPL CALC-MCNC: 109 MG/DL (ref 0–100)
LYMPHOCYTES # BLD AUTO: 1.69 THOUSANDS/ÂΜL (ref 0.6–4.47)
LYMPHOCYTES NFR BLD AUTO: 34 % (ref 14–44)
MCH RBC QN AUTO: 30.1 PG (ref 26.8–34.3)
MCHC RBC AUTO-ENTMCNC: 32.3 G/DL (ref 31.4–37.4)
MCV RBC AUTO: 93 FL (ref 82–98)
MONOCYTES # BLD AUTO: 0.39 THOUSAND/ÂΜL (ref 0.17–1.22)
MONOCYTES NFR BLD AUTO: 8 % (ref 4–12)
NEUTROPHILS # BLD AUTO: 2.66 THOUSANDS/ÂΜL (ref 1.85–7.62)
NEUTS SEG NFR BLD AUTO: 54 % (ref 43–75)
NONHDLC SERPL-MCNC: 132 MG/DL
NRBC BLD AUTO-RTO: 0 /100 WBCS
PLATELET # BLD AUTO: 308 THOUSANDS/UL (ref 149–390)
PMV BLD AUTO: 9.7 FL (ref 8.9–12.7)
POTASSIUM SERPL-SCNC: 3.9 MMOL/L (ref 3.5–5.3)
PROT SERPL-MCNC: 7.1 G/DL (ref 6.4–8.4)
RBC # BLD AUTO: 4.72 MILLION/UL (ref 3.81–5.12)
SODIUM SERPL-SCNC: 140 MMOL/L (ref 135–147)
TRIGL SERPL-MCNC: 115 MG/DL
WBC # BLD AUTO: 4.98 THOUSAND/UL (ref 4.31–10.16)

## 2024-01-22 PROCEDURE — 80053 COMPREHEN METABOLIC PANEL: CPT

## 2024-01-22 PROCEDURE — 80061 LIPID PANEL: CPT

## 2024-01-22 PROCEDURE — 85025 COMPLETE CBC W/AUTO DIFF WBC: CPT

## 2024-01-22 PROCEDURE — 36415 COLL VENOUS BLD VENIPUNCTURE: CPT

## 2024-02-07 ENCOUNTER — OFFICE VISIT (OUTPATIENT)
Dept: GYNECOLOGY | Facility: CLINIC | Age: 44
End: 2024-02-07
Payer: COMMERCIAL

## 2024-02-07 VITALS
DIASTOLIC BLOOD PRESSURE: 62 MMHG | HEIGHT: 63 IN | SYSTOLIC BLOOD PRESSURE: 118 MMHG | WEIGHT: 135.8 LBS | BODY MASS INDEX: 24.06 KG/M2

## 2024-02-07 DIAGNOSIS — Z01.419 ENCOUNTER FOR GYNECOLOGICAL EXAMINATION WITH PAPANICOLAOU SMEAR OF CERVIX: ICD-10-CM

## 2024-02-07 DIAGNOSIS — Z01.419 ENCOUNTER FOR GYNECOLOGICAL EXAMINATION WITHOUT ABNORMAL FINDING: Primary | ICD-10-CM

## 2024-02-07 PROCEDURE — S0612 ANNUAL GYNECOLOGICAL EXAMINA: HCPCS | Performed by: OBSTETRICS & GYNECOLOGY

## 2024-02-07 PROCEDURE — G0145 SCR C/V CYTO,THINLAYER,RESCR: HCPCS | Performed by: OBSTETRICS & GYNECOLOGY

## 2024-02-07 NOTE — PROGRESS NOTES
Assessment/Plan:       Diagnoses and all orders for this visit:    Encounter for gynecological examination without abnormal finding      Subjective:      Patient ID: Chelo Boykin is a 43 y.o. female.    HPI patient presents for annual examination.  Offers no complaints.  She is status post hysteroscopy D&C polypectomy in October 2023.  She has had a prior tubal.  Menses have been regular since then.  She denies any vaginal irritation burning.  Denies any dysuria, hematuria or urgency.  She does have mild tolerable stress urinary incontinence.  No GI complaints.    The following portions of the patient's history were reviewed and updated as appropriate: She  has a past medical history of Abnormal uterine bleeding (AUB), Anxiety, Blocked Eustachian tube, left, History of transfusion, and Strep sore throat (10/04/2023).  She   Patient Active Problem List    Diagnosis Date Noted    Anxiety 10/16/2023    Heartburn 10/16/2023    Status post hysteroscopy 10/16/2023    Dysfunctional uterine bleeding 07/26/2022     She  has a past surgical history that includes Kidney stone surgery; AUGMENTATION BREAST; Fracture surgery; Carpal tunnel release (Bilateral); Bunionectomy (Right); [place] tooth extraction (Bilateral); Tubal ligation; and pr hysteroscopy bx endometrium&/polypc w/wo d&c (N/A, 10/16/2023).  Her family history includes Alpha-1 antitrypsin deficiency in her paternal uncle; COPD in her father; Cervical cancer in her mother; Diabetes in her father; Graves' disease in her mother; Hyperlipidemia in her father and mother; Hypertension in her father and mother; Kidney failure in her father; Parkinsonism in her father; Thyroid disease in her father and mother.  She  reports that she has never smoked. She has never used smokeless tobacco. She reports current alcohol use of about 2.0 standard drinks of alcohol per week. She reports that she does not use drugs.  No current outpatient medications on file.     No current  "facility-administered medications for this visit.     No current outpatient medications on file prior to visit.     No current facility-administered medications on file prior to visit.     She is allergic to vicodin [hydrocodone-acetaminophen]..    Review of Systems   Constitutional: Negative.    HENT:  Negative for sore throat and trouble swallowing.    Gastrointestinal: Negative.    Genitourinary: Negative.          Objective:      /62 (BP Location: Left arm, Patient Position: Sitting, Cuff Size: Standard)   Ht 5' 3\" (1.6 m)   Wt 61.6 kg (135 lb 12.8 oz)   LMP 01/07/2024 (Approximate)   BMI 24.06 kg/m²          Physical Exam  Vitals reviewed.   Cardiovascular:      Rate and Rhythm: Normal rate and regular rhythm.      Pulses: Normal pulses.      Heart sounds: Normal heart sounds. No murmur heard.  Pulmonary:      Effort: Pulmonary effort is normal. No respiratory distress.      Breath sounds: Normal breath sounds.   Chest:   Breasts:     Right: No swelling, bleeding, inverted nipple, mass, nipple discharge, skin change or tenderness.      Left: No swelling, bleeding, inverted nipple, mass, nipple discharge, skin change or tenderness.      Comments: Bilateral implants  Abdominal:      General: There is no distension.      Palpations: Abdomen is soft. There is no mass.      Tenderness: There is no abdominal tenderness. There is no guarding or rebound.      Hernia: No hernia is present. There is no hernia in the left inguinal area or right inguinal area.   Genitourinary:     General: Normal vulva.      Labia:         Right: No rash, tenderness or lesion.         Left: No rash, tenderness or lesion.       Vagina: Normal.      Cervix: Normal.      Uterus: Normal.       Adnexa:         Right: No mass, tenderness or fullness.          Left: No mass, tenderness or fullness.     Musculoskeletal:      Cervical back: Normal range of motion and neck supple. No tenderness.   Lymphadenopathy:      Cervical: No " cervical adenopathy.      Upper Body:      Right upper body: No supraclavicular, axillary or pectoral adenopathy.      Left upper body: No supraclavicular, axillary or pectoral adenopathy.      Lower Body: No right inguinal adenopathy. No left inguinal adenopathy.   Neurological:      Mental Status: She is alert.

## 2024-02-14 LAB
LAB AP GYN PRIMARY INTERPRETATION: NORMAL
Lab: NORMAL

## 2024-02-29 ENCOUNTER — TELEPHONE (OUTPATIENT)
Dept: FAMILY MEDICINE CLINIC | Facility: CLINIC | Age: 44
End: 2024-02-29

## 2024-02-29 ENCOUNTER — TELEPHONE (OUTPATIENT)
Age: 44
End: 2024-02-29

## 2024-02-29 DIAGNOSIS — N63.20 MASS OF LEFT BREAST, UNSPECIFIED QUADRANT: Primary | ICD-10-CM

## 2024-02-29 NOTE — TELEPHONE ENCOUNTER
Pt needs a mammo placed as 6 mo nodule check up. Pt at  now. I am still working on getting fax number to send it.

## 2024-02-29 NOTE — TELEPHONE ENCOUNTER
Call from patient transferred over to the office by Eusebia.  Patient stated she was at  for her mammogram appointment, 6 month follow up from her US, which showed nodules.  After looking through the chart, I was unable to locate an order for a mammogram.  Spoke to Avel and he put in an order for a mammogram of the L breast.  Faxed to [phone] as requested.

## 2024-03-08 DIAGNOSIS — Z00.6 ENCOUNTER FOR EXAMINATION FOR NORMAL COMPARISON OR CONTROL IN CLINICAL RESEARCH PROGRAM: ICD-10-CM

## 2024-03-11 ENCOUNTER — APPOINTMENT (OUTPATIENT)
Dept: LAB | Facility: CLINIC | Age: 44
End: 2024-03-11

## 2024-03-11 DIAGNOSIS — Z00.6 ENCOUNTER FOR EXAMINATION FOR NORMAL COMPARISON OR CONTROL IN CLINICAL RESEARCH PROGRAM: ICD-10-CM

## 2024-03-11 PROCEDURE — 36415 COLL VENOUS BLD VENIPUNCTURE: CPT

## 2024-03-20 ENCOUNTER — TELEPHONE (OUTPATIENT)
Dept: PSYCHIATRY | Facility: CLINIC | Age: 44
End: 2024-03-20

## 2024-03-20 NOTE — TELEPHONE ENCOUNTER
Contacted patient off of Medication Management  to verify needs of services in attempts to offer patient an appointment at [facility] . LVM for patient to contact intake dept  in regards to wait list.

## 2024-03-28 LAB
APOB+LDLR+PCSK9 GENE MUT ANL BLD/T: NOT DETECTED
BRCA1+BRCA2 DEL+DUP + FULL MUT ANL BLD/T: NOT DETECTED
MLH1+MSH2+MSH6+PMS2 GN DEL+DUP+FUL M: NOT DETECTED

## 2024-04-05 NOTE — TELEPHONE ENCOUNTER
Contacted patient off of Medication Management  to verify needs of services in attempts to offer patient an appointment at [facility] . LVM for patient to contact intake dept  in regards to wait list      PATIENT REMOVED FROM LIST DUE TO UNABLE TO CONTACT PATIENT

## 2024-05-29 ENCOUNTER — APPOINTMENT (EMERGENCY)
Dept: CT IMAGING | Facility: HOSPITAL | Age: 44
End: 2024-05-29
Payer: COMMERCIAL

## 2024-05-29 ENCOUNTER — HOSPITAL ENCOUNTER (EMERGENCY)
Facility: HOSPITAL | Age: 44
Discharge: HOME | End: 2024-05-29
Attending: STUDENT IN AN ORGANIZED HEALTH CARE EDUCATION/TRAINING PROGRAM
Payer: COMMERCIAL

## 2024-05-29 ENCOUNTER — APPOINTMENT (EMERGENCY)
Dept: ULTRASOUND IMAGING | Facility: HOSPITAL | Age: 44
End: 2024-05-29
Payer: COMMERCIAL

## 2024-05-29 VITALS
SYSTOLIC BLOOD PRESSURE: 110 MMHG | HEART RATE: 80 BPM | RESPIRATION RATE: 16 BRPM | OXYGEN SATURATION: 98 % | WEIGHT: 136 LBS | HEIGHT: 63 IN | TEMPERATURE: 97.6 F | BODY MASS INDEX: 24.1 KG/M2 | DIASTOLIC BLOOD PRESSURE: 68 MMHG

## 2024-05-29 DIAGNOSIS — N12 PYELONEPHRITIS: Primary | ICD-10-CM

## 2024-05-29 DIAGNOSIS — R10.11 RIGHT UPPER QUADRANT PAIN: ICD-10-CM

## 2024-05-29 DIAGNOSIS — R10.9 RIGHT FLANK PAIN: ICD-10-CM

## 2024-05-29 LAB
ALBUMIN SERPL BCP-MCNC: 4.2 G/DL (ref 3.5–5)
ALP SERPL-CCNC: 51 U/L (ref 34–104)
ALT SERPL W P-5'-P-CCNC: 15 U/L (ref 7–52)
ANION GAP SERPL CALCULATED.3IONS-SCNC: 3 MMOL/L (ref 4–13)
AST SERPL W P-5'-P-CCNC: 14 U/L (ref 13–39)
BACTERIA UR QL AUTO: ABNORMAL /HPF
BASOPHILS # BLD AUTO: 0.06 THOUSANDS/ÂΜL (ref 0–0.1)
BASOPHILS NFR BLD AUTO: 1 % (ref 0–1)
BILIRUB SERPL-MCNC: 0.58 MG/DL (ref 0.2–1)
BILIRUB UR QL STRIP: NEGATIVE
BUN SERPL-MCNC: 12 MG/DL (ref 5–25)
CALCIUM SERPL-MCNC: 8.9 MG/DL (ref 8.4–10.2)
CHLORIDE SERPL-SCNC: 106 MMOL/L (ref 96–108)
CLARITY UR: ABNORMAL
CO2 SERPL-SCNC: 29 MMOL/L (ref 21–32)
COLOR UR: YELLOW
CREAT SERPL-MCNC: 0.73 MG/DL (ref 0.6–1.3)
EOSINOPHIL # BLD AUTO: 0.27 THOUSAND/ÂΜL (ref 0–0.61)
EOSINOPHIL NFR BLD AUTO: 3 % (ref 0–6)
ERYTHROCYTE [DISTWIDTH] IN BLOOD BY AUTOMATED COUNT: 12.5 % (ref 11.6–15.1)
EXT PREGNANCY TEST URINE: NEGATIVE
EXT. CONTROL: NORMAL
GFR SERPL CREATININE-BSD FRML MDRD: 101 ML/MIN/1.73SQ M
GLUCOSE SERPL-MCNC: 92 MG/DL (ref 65–140)
GLUCOSE UR STRIP-MCNC: NEGATIVE MG/DL
HCT VFR BLD AUTO: 41.3 % (ref 34.8–46.1)
HGB BLD-MCNC: 13.7 G/DL (ref 11.5–15.4)
HGB UR QL STRIP.AUTO: NEGATIVE
IMM GRANULOCYTES # BLD AUTO: 0.03 THOUSAND/UL (ref 0–0.2)
IMM GRANULOCYTES NFR BLD AUTO: 0 % (ref 0–2)
KETONES UR STRIP-MCNC: NEGATIVE MG/DL
LEUKOCYTE ESTERASE UR QL STRIP: ABNORMAL
LIPASE SERPL-CCNC: 36 U/L (ref 11–82)
LYMPHOCYTES # BLD AUTO: 2.31 THOUSANDS/ÂΜL (ref 0.6–4.47)
LYMPHOCYTES NFR BLD AUTO: 27 % (ref 14–44)
MCH RBC QN AUTO: 30.2 PG (ref 26.8–34.3)
MCHC RBC AUTO-ENTMCNC: 33.2 G/DL (ref 31.4–37.4)
MCV RBC AUTO: 91 FL (ref 82–98)
MONOCYTES # BLD AUTO: 0.59 THOUSAND/ÂΜL (ref 0.17–1.22)
MONOCYTES NFR BLD AUTO: 7 % (ref 4–12)
NEUTROPHILS # BLD AUTO: 5.17 THOUSANDS/ÂΜL (ref 1.85–7.62)
NEUTS SEG NFR BLD AUTO: 62 % (ref 43–75)
NITRITE UR QL STRIP: NEGATIVE
NON-SQ EPI CELLS URNS QL MICRO: ABNORMAL /HPF
NRBC BLD AUTO-RTO: 0 /100 WBCS
PH UR STRIP.AUTO: 6 [PH]
PLATELET # BLD AUTO: 272 THOUSANDS/UL (ref 149–390)
PMV BLD AUTO: 9.1 FL (ref 8.9–12.7)
POTASSIUM SERPL-SCNC: 3.9 MMOL/L (ref 3.5–5.3)
PROT SERPL-MCNC: 7 G/DL (ref 6.4–8.4)
PROT UR STRIP-MCNC: NEGATIVE MG/DL
RBC # BLD AUTO: 4.53 MILLION/UL (ref 3.81–5.12)
RBC #/AREA URNS AUTO: ABNORMAL /HPF
SODIUM SERPL-SCNC: 138 MMOL/L (ref 135–147)
SP GR UR STRIP.AUTO: 1.02 (ref 1–1.03)
UROBILINOGEN UR QL STRIP.AUTO: 0.2 E.U./DL
WBC # BLD AUTO: 8.43 THOUSAND/UL (ref 4.31–10.16)
WBC #/AREA URNS AUTO: ABNORMAL /HPF

## 2024-05-29 PROCEDURE — 85025 COMPLETE CBC W/AUTO DIFF WBC: CPT | Performed by: STUDENT IN AN ORGANIZED HEALTH CARE EDUCATION/TRAINING PROGRAM

## 2024-05-29 PROCEDURE — 81001 URINALYSIS AUTO W/SCOPE: CPT | Performed by: STUDENT IN AN ORGANIZED HEALTH CARE EDUCATION/TRAINING PROGRAM

## 2024-05-29 PROCEDURE — 74177 CT ABD & PELVIS W/CONTRAST: CPT

## 2024-05-29 PROCEDURE — 99284 EMERGENCY DEPT VISIT MOD MDM: CPT

## 2024-05-29 PROCEDURE — 96365 THER/PROPH/DIAG IV INF INIT: CPT

## 2024-05-29 PROCEDURE — 96367 TX/PROPH/DG ADDL SEQ IV INF: CPT

## 2024-05-29 PROCEDURE — 76705 ECHO EXAM OF ABDOMEN: CPT

## 2024-05-29 PROCEDURE — 83690 ASSAY OF LIPASE: CPT | Performed by: STUDENT IN AN ORGANIZED HEALTH CARE EDUCATION/TRAINING PROGRAM

## 2024-05-29 PROCEDURE — 99284 EMERGENCY DEPT VISIT MOD MDM: CPT | Performed by: STUDENT IN AN ORGANIZED HEALTH CARE EDUCATION/TRAINING PROGRAM

## 2024-05-29 PROCEDURE — 81025 URINE PREGNANCY TEST: CPT | Performed by: STUDENT IN AN ORGANIZED HEALTH CARE EDUCATION/TRAINING PROGRAM

## 2024-05-29 PROCEDURE — 96376 TX/PRO/DX INJ SAME DRUG ADON: CPT

## 2024-05-29 PROCEDURE — 36415 COLL VENOUS BLD VENIPUNCTURE: CPT | Performed by: STUDENT IN AN ORGANIZED HEALTH CARE EDUCATION/TRAINING PROGRAM

## 2024-05-29 PROCEDURE — 96375 TX/PRO/DX INJ NEW DRUG ADDON: CPT

## 2024-05-29 PROCEDURE — 87086 URINE CULTURE/COLONY COUNT: CPT | Performed by: STUDENT IN AN ORGANIZED HEALTH CARE EDUCATION/TRAINING PROGRAM

## 2024-05-29 PROCEDURE — 80053 COMPREHEN METABOLIC PANEL: CPT | Performed by: STUDENT IN AN ORGANIZED HEALTH CARE EDUCATION/TRAINING PROGRAM

## 2024-05-29 RX ORDER — KETOROLAC TROMETHAMINE 30 MG/ML
15 INJECTION, SOLUTION INTRAMUSCULAR; INTRAVENOUS ONCE
Status: COMPLETED | OUTPATIENT
Start: 2024-05-29 | End: 2024-05-29

## 2024-05-29 RX ORDER — CEFDINIR 300 MG/1
300 CAPSULE ORAL EVERY 12 HOURS SCHEDULED
Qty: 14 CAPSULE | Refills: 0 | Status: SHIPPED | OUTPATIENT
Start: 2024-05-29 | End: 2024-06-05

## 2024-05-29 RX ORDER — ACETAMINOPHEN 325 MG/1
975 TABLET ORAL ONCE
Status: COMPLETED | OUTPATIENT
Start: 2024-05-29 | End: 2024-05-29

## 2024-05-29 RX ORDER — CEFDINIR 300 MG/1
300 CAPSULE ORAL EVERY 12 HOURS SCHEDULED
Qty: 20 CAPSULE | Refills: 0 | Status: SHIPPED | OUTPATIENT
Start: 2024-05-29 | End: 2024-05-29 | Stop reason: CLARIF

## 2024-05-29 RX ORDER — CEFTRIAXONE 1 G/50ML
1000 INJECTION, SOLUTION INTRAVENOUS ONCE
Status: COMPLETED | OUTPATIENT
Start: 2024-05-29 | End: 2024-05-29

## 2024-05-29 RX ORDER — SODIUM CHLORIDE, SODIUM GLUCONATE, SODIUM ACETATE, POTASSIUM CHLORIDE, MAGNESIUM CHLORIDE, SODIUM PHOSPHATE, DIBASIC, AND POTASSIUM PHOSPHATE .53; .5; .37; .037; .03; .012; .00082 G/100ML; G/100ML; G/100ML; G/100ML; G/100ML; G/100ML; G/100ML
1000 INJECTION, SOLUTION INTRAVENOUS ONCE
Status: COMPLETED | OUTPATIENT
Start: 2024-05-29 | End: 2024-05-29

## 2024-05-29 RX ORDER — ONDANSETRON 2 MG/ML
4 INJECTION INTRAMUSCULAR; INTRAVENOUS ONCE
Status: COMPLETED | OUTPATIENT
Start: 2024-05-29 | End: 2024-05-29

## 2024-05-29 RX ADMIN — IOHEXOL 100 ML: 350 INJECTION, SOLUTION INTRAVENOUS at 07:20

## 2024-05-29 RX ADMIN — ACETAMINOPHEN 325MG 975 MG: 325 TABLET ORAL at 07:49

## 2024-05-29 RX ADMIN — SODIUM CHLORIDE, SODIUM GLUCONATE, SODIUM ACETATE, POTASSIUM CHLORIDE, MAGNESIUM CHLORIDE, SODIUM PHOSPHATE, DIBASIC, AND POTASSIUM PHOSPHATE 1000 ML: .53; .5; .37; .037; .03; .012; .00082 INJECTION, SOLUTION INTRAVENOUS at 06:43

## 2024-05-29 RX ADMIN — KETOROLAC TROMETHAMINE 15 MG: 30 INJECTION, SOLUTION INTRAMUSCULAR; INTRAVENOUS at 06:44

## 2024-05-29 RX ADMIN — KETOROLAC TROMETHAMINE 15 MG: 30 INJECTION, SOLUTION INTRAMUSCULAR; INTRAVENOUS at 07:49

## 2024-05-29 RX ADMIN — ONDANSETRON 4 MG: 2 INJECTION INTRAMUSCULAR; INTRAVENOUS at 06:44

## 2024-05-29 RX ADMIN — CEFTRIAXONE 1000 MG: 1 INJECTION, SOLUTION INTRAVENOUS at 07:49

## 2024-05-29 NOTE — Clinical Note
Chelo Ems was seen and treated in our emergency department on 5/29/2024.                Diagnosis:     Chelo  .    She may return on this date:     Please excuse Chelo Boykin from work on 5/29/24     If you have any questions or concerns, please don't hesitate to call.      You Boswell, DO    ______________________________           _______________          _______________  Hospital Representative                              Date                                Time

## 2024-05-29 NOTE — DISCHARGE INSTRUCTIONS
You are being prescribed a course of antibiotics for treatment of a urinary tract infection, possible kidney infection.  Please take your antibiotics as directed.    For pain, you can take ibuprofen 600 mg every 6 hours and Tylenol 1000 mg every 6 hours.    If your symptoms remain persistent despite antibiotic use or you develop worsening pain/fever/chills/any other concerning signs and symptoms, seek re-evaluation in the ED.
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
## 2024-05-30 LAB — BACTERIA UR CULT: NORMAL

## 2024-06-07 ENCOUNTER — DOCTOR'S OFFICE (OUTPATIENT)
Dept: URBAN - NONMETROPOLITAN AREA CLINIC 1 | Facility: CLINIC | Age: 44
Setting detail: OPHTHALMOLOGY
End: 2024-06-07
Payer: COMMERCIAL

## 2024-06-07 ENCOUNTER — RX ONLY (RX ONLY)
Age: 44
End: 2024-06-07

## 2024-06-07 DIAGNOSIS — H52.4: ICD-10-CM

## 2024-06-07 DIAGNOSIS — H52.223: ICD-10-CM

## 2024-06-07 PROCEDURE — 92014 COMPRE OPH EXAM EST PT 1/>: CPT | Performed by: OPTOMETRIST

## 2024-06-07 ASSESSMENT — CONFRONTATIONAL VISUAL FIELD TEST (CVF)
OS_FINDINGS: FULL
OD_FINDINGS: FULL

## 2024-06-26 ENCOUNTER — HOSPITAL ENCOUNTER (EMERGENCY)
Facility: HOSPITAL | Age: 44
Discharge: HOME | End: 2024-06-26
Attending: EMERGENCY MEDICINE
Payer: COMMERCIAL

## 2024-06-26 ENCOUNTER — APPOINTMENT (EMERGENCY)
Dept: RADIOLOGY | Facility: HOSPITAL | Age: 44
End: 2024-06-26
Payer: COMMERCIAL

## 2024-06-26 VITALS
SYSTOLIC BLOOD PRESSURE: 116 MMHG | WEIGHT: 137 LBS | OXYGEN SATURATION: 97 % | HEART RATE: 74 BPM | TEMPERATURE: 97.9 F | BODY MASS INDEX: 24.27 KG/M2 | DIASTOLIC BLOOD PRESSURE: 59 MMHG | RESPIRATION RATE: 14 BRPM | HEIGHT: 63 IN

## 2024-06-26 DIAGNOSIS — R07.89 ATYPICAL CHEST PAIN: Primary | ICD-10-CM

## 2024-06-26 LAB
2HR DELTA HS TROPONIN: <-1 NG/L
ALBUMIN SERPL BCG-MCNC: 4.3 G/DL (ref 3.5–5)
ALP SERPL-CCNC: 54 U/L (ref 34–104)
ALT SERPL W P-5'-P-CCNC: 13 U/L (ref 7–52)
ANION GAP SERPL CALCULATED.3IONS-SCNC: 4 MMOL/L (ref 4–13)
AST SERPL W P-5'-P-CCNC: 14 U/L (ref 13–39)
BASOPHILS # BLD AUTO: 0.07 THOUSANDS/ÂΜL (ref 0–0.1)
BASOPHILS NFR BLD AUTO: 1 % (ref 0–1)
BILIRUB SERPL-MCNC: 0.45 MG/DL (ref 0.2–1)
BUN SERPL-MCNC: 15 MG/DL (ref 5–25)
CALCIUM SERPL-MCNC: 9.3 MG/DL (ref 8.4–10.2)
CARDIAC TROPONIN I PNL SERPL HS: 3 NG/L
CARDIAC TROPONIN I PNL SERPL HS: <2 NG/L
CHLORIDE SERPL-SCNC: 107 MMOL/L (ref 96–108)
CO2 SERPL-SCNC: 27 MMOL/L (ref 21–32)
CREAT SERPL-MCNC: 0.75 MG/DL (ref 0.6–1.3)
EOSINOPHIL # BLD AUTO: 0.27 THOUSAND/ÂΜL (ref 0–0.61)
EOSINOPHIL NFR BLD AUTO: 3 % (ref 0–6)
ERYTHROCYTE [DISTWIDTH] IN BLOOD BY AUTOMATED COUNT: 12.6 % (ref 11.6–15.1)
GFR SERPL CREATININE-BSD FRML MDRD: 98 ML/MIN/1.73SQ M
GLUCOSE SERPL-MCNC: 119 MG/DL (ref 65–140)
HCT VFR BLD AUTO: 41.3 % (ref 34.8–46.1)
HGB BLD-MCNC: 13.9 G/DL (ref 11.5–15.4)
IMM GRANULOCYTES # BLD AUTO: 0.04 THOUSAND/UL (ref 0–0.2)
IMM GRANULOCYTES NFR BLD AUTO: 1 % (ref 0–2)
LYMPHOCYTES # BLD AUTO: 2.26 THOUSANDS/ÂΜL (ref 0.6–4.47)
LYMPHOCYTES NFR BLD AUTO: 28 % (ref 14–44)
MCH RBC QN AUTO: 30.2 PG (ref 26.8–34.3)
MCHC RBC AUTO-ENTMCNC: 33.7 G/DL (ref 31.4–37.4)
MCV RBC AUTO: 90 FL (ref 82–98)
MONOCYTES # BLD AUTO: 0.74 THOUSAND/ÂΜL (ref 0.17–1.22)
MONOCYTES NFR BLD AUTO: 9 % (ref 4–12)
NEUTROPHILS # BLD AUTO: 4.85 THOUSANDS/ÂΜL (ref 1.85–7.62)
NEUTS SEG NFR BLD AUTO: 58 % (ref 43–75)
NRBC BLD AUTO-RTO: 0 /100 WBCS
PLATELET # BLD AUTO: 285 THOUSANDS/UL (ref 149–390)
PMV BLD AUTO: 9 FL (ref 8.9–12.7)
POTASSIUM SERPL-SCNC: 3.4 MMOL/L (ref 3.5–5.3)
PROT SERPL-MCNC: 7.3 G/DL (ref 6.4–8.4)
RBC # BLD AUTO: 4.6 MILLION/UL (ref 3.81–5.12)
SODIUM SERPL-SCNC: 138 MMOL/L (ref 135–147)
WBC # BLD AUTO: 8.23 THOUSAND/UL (ref 4.31–10.16)

## 2024-06-26 PROCEDURE — 71045 X-RAY EXAM CHEST 1 VIEW: CPT

## 2024-06-26 PROCEDURE — 80053 COMPREHEN METABOLIC PANEL: CPT | Performed by: EMERGENCY MEDICINE

## 2024-06-26 PROCEDURE — 85025 COMPLETE CBC W/AUTO DIFF WBC: CPT | Performed by: EMERGENCY MEDICINE

## 2024-06-26 PROCEDURE — 93005 ELECTROCARDIOGRAM TRACING: CPT

## 2024-06-26 PROCEDURE — 36415 COLL VENOUS BLD VENIPUNCTURE: CPT | Performed by: EMERGENCY MEDICINE

## 2024-06-26 PROCEDURE — 84484 ASSAY OF TROPONIN QUANT: CPT | Performed by: EMERGENCY MEDICINE

## 2024-06-26 PROCEDURE — 99285 EMERGENCY DEPT VISIT HI MDM: CPT | Performed by: EMERGENCY MEDICINE

## 2024-06-26 RX ORDER — KETOROLAC TROMETHAMINE 30 MG/ML
15 INJECTION, SOLUTION INTRAMUSCULAR; INTRAVENOUS ONCE
Status: COMPLETED | OUTPATIENT
Start: 2024-06-26 | End: 2024-06-26

## 2024-06-26 RX ADMIN — KETOROLAC TROMETHAMINE 15 MG: 30 INJECTION, SOLUTION INTRAMUSCULAR at 16:07

## 2024-06-26 NOTE — DISCHARGE INSTRUCTIONS
There were no acute findings related to your heart.  We recommend that you follow-up with your primary care physician in the next week or so in case they wish to perform additional testing.    Your imaging studies have been preliminarily reviewed by the emergency department.  Further review by Radiology is pending at this time.  If there is a discrepancy or a finding of additional concern identified, we will attempt to contact you at the number you have provided us.  If you do not hear from us, follow-up with your primary care provider within 1-2 weeks is always recommended to ensure that all findings were normal or as initially reported.  Your results may also be available on [email or web address]    Please also note that sometimes there are subtle abnormalities in your lab values that you may observe when you access your record online.  These are frequently not worrisome and if they are of concern we will have discussed them with you.  However, we always encourage that you discuss any concerns you may have or observe on your record with your primary care provider.  Please also note that while your visit documentation was reviewed prior to completion, voice transcription will occasionally recognize words or grammar differently than what was spoken.
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
## 2024-06-27 LAB
ATRIAL RATE: 79 BPM
P AXIS: 47 DEGREES
PR INTERVAL: 144 MS
QRS AXIS: 78 DEGREES
QRSD INTERVAL: 78 MS
QT INTERVAL: 366 MS
QTC INTERVAL: 419 MS
T WAVE AXIS: 59 DEGREES
VENTRICULAR RATE: 79 BPM

## 2024-06-28 ENCOUNTER — TELEPHONE (OUTPATIENT)
Dept: ADMINISTRATIVE | Facility: OTHER | Age: 44
End: 2024-06-28

## 2024-06-28 NOTE — TELEPHONE ENCOUNTER
----- Message from Alicia HOOKS sent at 6/28/2024  8:43 AM EDT -----  Regarding: quality metric update  06/28/24 8:43 AM    Hello, our patient above has had Hepatitis C completed/performed. Please assist in updating the patient chart by pulling the Care Everywhere (CE) document. The date of service is 08/08/2019.     Thank you,  Alicia Gautam  [facility] PRIMARY [facility]

## 2024-06-28 NOTE — TELEPHONE ENCOUNTER
Upon review of the In Basket request we were able to locate, review, and update the patient chart as requested for Hepatitis C .    Any additional questions or concerns should be emailed to the Practice Liaisons via the appropriate education email address, please do not reply via In Basket.    Thank you  Lorna Purdy   [facility]

## 2024-07-01 ENCOUNTER — OFFICE VISIT (OUTPATIENT)
Dept: FAMILY MEDICINE CLINIC | Facility: CLINIC | Age: 44
End: 2024-07-01
Payer: COMMERCIAL

## 2024-07-01 VITALS
OXYGEN SATURATION: 100 % | BODY MASS INDEX: 24.17 KG/M2 | SYSTOLIC BLOOD PRESSURE: 114 MMHG | WEIGHT: 136.47 LBS | DIASTOLIC BLOOD PRESSURE: 56 MMHG | TEMPERATURE: 98.4 F | HEART RATE: 56 BPM

## 2024-07-01 DIAGNOSIS — F41.1 GENERALIZED ANXIETY DISORDER WITH PANIC ATTACKS: Primary | ICD-10-CM

## 2024-07-01 DIAGNOSIS — F41.0 GENERALIZED ANXIETY DISORDER WITH PANIC ATTACKS: Primary | ICD-10-CM

## 2024-07-01 PROCEDURE — 99214 OFFICE O/P EST MOD 30 MIN: CPT | Performed by: FAMILY MEDICINE

## 2024-07-01 RX ORDER — ESCITALOPRAM OXALATE 10 MG/1
10 TABLET ORAL DAILY
Qty: 30 TABLET | Refills: 0 | Status: SHIPPED | OUTPATIENT
Start: 2024-07-15 | End: 2024-08-14

## 2024-07-01 RX ORDER — HYDROXYZINE PAMOATE 25 MG/1
25 CAPSULE ORAL 3 TIMES DAILY PRN
Qty: 30 CAPSULE | Refills: 0 | Status: SHIPPED | OUTPATIENT
Start: 2024-07-01

## 2024-07-01 RX ORDER — ESCITALOPRAM OXALATE 5 MG/1
5 TABLET ORAL DAILY
Qty: 14 TABLET | Refills: 0 | Status: SHIPPED | OUTPATIENT
Start: 2024-07-01

## 2024-07-01 NOTE — PROGRESS NOTES
"Ambulatory Visit  Name: Chelo REDMAN Ems      : 1980      MRN: [id number]  Encounter Provider: Abril Mcneil DO  Encounter Date: 2024   Encounter department: [facility] PRIMARY CARE    Assessment & Plan   1. Generalized anxiety disorder with panic attacks  Assessment & Plan:  Chronic, acutely worsening  GAD7 score today is 19  Patient to continue looking to est with regular therapy  Will restart patient on lexapro 5mg daily for 2 weeks, then increase to 10mg daily  Add prn Hydrozyzine for panic  Follow up 4 weeks to recheck    Orders:  -     escitalopram (LEXAPRO) 5 mg tablet; Take 1 tablet (5 mg total) by mouth daily  -     escitalopram (LEXAPRO) 10 mg tablet; Take 1 tablet (10 mg total) by mouth daily Do not start before July 15, 2024.  -     hydrOXYzine pamoate (VISTARIL) 25 mg capsule; Take 1 capsule (25 mg total) by mouth 3 (three) times a day as needed for anxiety    ER documentation reviewed from 2024  Labs 24 reviewed: CBC, CMP, tropx2  Reviewed CXR and ECG reports from ER         Return in about 4 weeks (around 2024) for Recheck mood.    History of Present Illness     HPI  Chief Complaint   Patient presents with    Follow-up     ER follow up      Patient presents for ER follow up. She was seen in the ER 24 for chest pain. Workup with CBC, CMP, tropx2, CXR, and eCG were unremarkable. She reports the pain started that day while at work. The pain was left sided and she had never had a pain like this before. She went to the ER when the pain started going into her left jaw and left shoulder. Her coworkers noted she didn't look well at the time also prompting her going to the ER. The pain lasted about 3-5 minutes. The numbness and tingling in her shoulder lasted about an hour or so. She also had a headache in the ER and felt very tired.     Since going home from the ER the pain and numb has not returned at all. She reports just \"normal anxiety and stress.\" " Her stress level has significantly increased in the past two months with her new job. She has not found any outlets for her stressors at this time. She is also taking care of her children and her father.     Since we last spoke, she was going to start therapy but never got started with this due to a time constraint. She missed two group therapy sessions. She has taken lexapro in the past and did well but this was a long   KRISH-7 Flowsheet Screening      Flowsheet Row Most Recent Value   Over the last 2 weeks, how often have you been bothered by any of the following problems?    Feeling nervous, anxious, or on edge 3   Not being able to stop or control worrying 3   Worrying too much about different things 3   Trouble relaxing 3   Being so restless that it is hard to sit still 3   Becoming easily annoyed or irritable 3   Feeling afraid as if something awful might happen 1   KRISH-7 Total Score 19            Review of Systems   Psychiatric/Behavioral:  Positive for agitation and sleep disturbance. The patient is nervous/anxious and is hyperactive.      No current outpatient medications on file prior to visit.     No current facility-administered medications on file prior to visit.      Social History     Tobacco Use    Smoking status: Never    Smokeless tobacco: Never   Vaping Use    Vaping status: Never Used   Substance and Sexual Activity    Alcohol use: Yes     Alcohol/week: 2.0 standard drinks of alcohol     Types: 2 Shots of liquor per week     Comment: occasion    Drug use: No    Sexual activity: Yes     Partners: Male     Birth control/protection: Female Sterilization     Comment: she is      Objective     /56 (BP Location: Right arm, Patient Position: Sitting, Cuff Size: Standard)   Pulse 56   Temp 98.4 °F (36.9 °C) (Tympanic)   Wt 61.9 kg (136 lb 7.4 oz)   SpO2 100%   BMI 24.17 kg/m²     Physical Exam  Vitals reviewed.   Constitutional:       General: She is not in acute distress.      Appearance: She is normal weight. She is not ill-appearing.   HENT:      Head: Normocephalic and atraumatic.   Cardiovascular:      Rate and Rhythm: Normal rate.   Pulmonary:      Effort: Pulmonary effort is normal.   Neurological:      Mental Status: She is alert.   Psychiatric:         Attention and Perception: Attention normal.         Mood and Affect: Affect normal. Mood is anxious.         Speech: Speech normal.         Behavior: Behavior normal. Behavior is cooperative.         Thought Content: Thought content normal.       Administrative Statements

## 2024-07-01 NOTE — ASSESSMENT & PLAN NOTE
Chronic, acutely worsening  GAD7 score today is 19  Patient to continue looking to est with regular therapy  Will restart patient on lexapro 5mg daily for 2 weeks, then increase to 10mg daily  Add prn Hydrozyzine for panic  Follow up 4 weeks to recheck

## 2024-07-16 ENCOUNTER — RA CDI HCC (OUTPATIENT)
Dept: OTHER | Facility: HOSPITAL | Age: 44
End: 2024-07-16

## 2024-07-17 ENCOUNTER — PATIENT MESSAGE (OUTPATIENT)
Dept: FAMILY MEDICINE CLINIC | Facility: CLINIC | Age: 44
End: 2024-07-17

## 2024-07-17 DIAGNOSIS — K04.7 DENTAL INFECTION: Primary | ICD-10-CM

## 2024-07-17 RX ORDER — AMOXICILLIN AND CLAVULANATE POTASSIUM 875; 125 MG/1; MG/1
1 TABLET, FILM COATED ORAL EVERY 12 HOURS SCHEDULED
Qty: 14 TABLET | Refills: 0 | Status: SHIPPED | OUTPATIENT
Start: 2024-07-17 | End: 2024-07-24

## 2024-08-21 ENCOUNTER — HOSPITAL ENCOUNTER (OUTPATIENT)
Dept: RADIOLOGY | Facility: CLINIC | Age: 44
Discharge: HOME | End: 2024-08-21
Payer: COMMERCIAL

## 2024-08-21 DIAGNOSIS — R92.8 FOLLOW-UP EXAMINATION OF ABNORMAL MAMMOGRAM: ICD-10-CM

## 2024-08-21 DIAGNOSIS — N63.20 MASS OF LEFT BREAST, UNSPECIFIED QUADRANT: ICD-10-CM

## 2024-08-21 PROCEDURE — 77066 DX MAMMO INCL CAD BI: CPT

## 2024-08-21 PROCEDURE — 76642 ULTRASOUND BREAST LIMITED: CPT

## 2024-08-21 PROCEDURE — G0279 TOMOSYNTHESIS, MAMMO: HCPCS

## 2024-09-06 ENCOUNTER — OFFICE VISIT (OUTPATIENT)
Dept: GASTROENTEROLOGY | Facility: CLINIC | Age: 44
End: 2024-09-06
Payer: COMMERCIAL

## 2024-09-06 ENCOUNTER — PREP FOR PROCEDURE (OUTPATIENT)
Dept: GASTROENTEROLOGY | Facility: CLINIC | Age: 44
End: 2024-09-06

## 2024-09-06 VITALS
HEIGHT: 63 IN | SYSTOLIC BLOOD PRESSURE: 108 MMHG | TEMPERATURE: 97.5 F | DIASTOLIC BLOOD PRESSURE: 67 MMHG | OXYGEN SATURATION: 98 % | WEIGHT: 139.6 LBS | HEART RATE: 76 BPM | BODY MASS INDEX: 24.73 KG/M2

## 2024-09-06 DIAGNOSIS — R10.11 RIGHT UPPER QUADRANT ABDOMINAL PAIN: Primary | ICD-10-CM

## 2024-09-06 PROCEDURE — 99244 OFF/OP CNSLTJ NEW/EST MOD 40: CPT | Performed by: INTERNAL MEDICINE

## 2024-09-06 RX ORDER — DICYCLOMINE HCL 20 MG
20 TABLET ORAL 4 TIMES DAILY PRN
Qty: 40 TABLET | Refills: 2 | Status: SHIPPED | OUTPATIENT
Start: 2024-09-06

## 2024-09-06 NOTE — PATIENT INSTRUCTIONS
Scheduled date of EGD(as of today):9/10/24  Physician performing EGD:Monica  Location of EGD:[place]  Instructions reviewed with patient by:Matt youngblood  Clearances:  none

## 2024-09-09 ENCOUNTER — APPOINTMENT (OUTPATIENT)
Dept: LAB | Facility: CLINIC | Age: 44
End: 2024-09-09
Payer: COMMERCIAL

## 2024-09-09 DIAGNOSIS — R10.11 RIGHT UPPER QUADRANT ABDOMINAL PAIN: ICD-10-CM

## 2024-09-09 PROCEDURE — 36415 COLL VENOUS BLD VENIPUNCTURE: CPT

## 2024-09-09 PROCEDURE — 86258 DGP ANTIBODY EACH IG CLASS: CPT

## 2024-09-09 PROCEDURE — 82784 ASSAY IGA/IGD/IGG/IGM EACH: CPT

## 2024-09-09 PROCEDURE — 86364 TISS TRNSGLTMNASE EA IG CLAS: CPT

## 2024-09-10 ENCOUNTER — ANESTHESIA (OUTPATIENT)
Dept: GASTROENTEROLOGY | Facility: HOSPITAL | Age: 44
End: 2024-09-10
Payer: COMMERCIAL

## 2024-09-10 ENCOUNTER — HOSPITAL ENCOUNTER (OUTPATIENT)
Dept: GASTROENTEROLOGY | Facility: HOSPITAL | Age: 44
Setting detail: OUTPATIENT SURGERY
Discharge: HOME | End: 2024-09-10
Attending: INTERNAL MEDICINE
Payer: COMMERCIAL

## 2024-09-10 ENCOUNTER — ANESTHESIA EVENT (OUTPATIENT)
Dept: GASTROENTEROLOGY | Facility: HOSPITAL | Age: 44
End: 2024-09-10
Payer: COMMERCIAL

## 2024-09-10 VITALS
TEMPERATURE: 98.8 F | OXYGEN SATURATION: 100 % | HEART RATE: 69 BPM | WEIGHT: 137.35 LBS | SYSTOLIC BLOOD PRESSURE: 100 MMHG | BODY MASS INDEX: 24.34 KG/M2 | HEIGHT: 63 IN | DIASTOLIC BLOOD PRESSURE: 59 MMHG | RESPIRATION RATE: 22 BRPM

## 2024-09-10 DIAGNOSIS — R10.11 RIGHT UPPER QUADRANT ABDOMINAL PAIN: ICD-10-CM

## 2024-09-10 LAB
EXT PREGNANCY TEST URINE: NEGATIVE
EXT. CONTROL: NORMAL
GLIADIN PEPTIDE IGA SER-ACNC: 0.7 U/ML
GLIADIN PEPTIDE IGA SER-ACNC: NEGATIVE
GLIADIN PEPTIDE IGG SER-ACNC: <0.4 U/ML
GLIADIN PEPTIDE IGG SER-ACNC: NEGATIVE
IGA SERPL-MCNC: 157 MG/DL (ref 66–433)
TTG IGA SER-ACNC: <0.5 U/ML
TTG IGA SER-ACNC: NEGATIVE
TTG IGG SER-ACNC: <0.8 U/ML
TTG IGG SER-ACNC: NEGATIVE

## 2024-09-10 PROCEDURE — 81025 URINE PREGNANCY TEST: CPT | Performed by: STUDENT IN AN ORGANIZED HEALTH CARE EDUCATION/TRAINING PROGRAM

## 2024-09-10 PROCEDURE — 88305 TISSUE EXAM BY PATHOLOGIST: CPT | Performed by: PATHOLOGY

## 2024-09-10 PROCEDURE — 43239 EGD BIOPSY SINGLE/MULTIPLE: CPT | Performed by: INTERNAL MEDICINE

## 2024-09-10 RX ORDER — PROPOFOL 10 MG/ML
INJECTION, EMULSION INTRAVENOUS AS NEEDED
Status: DISCONTINUED | OUTPATIENT
Start: 2024-09-10 | End: 2024-09-10

## 2024-09-10 RX ORDER — SODIUM CHLORIDE, SODIUM LACTATE, POTASSIUM CHLORIDE, CALCIUM CHLORIDE 600; 310; 30; 20 MG/100ML; MG/100ML; MG/100ML; MG/100ML
125 INJECTION, SOLUTION INTRAVENOUS CONTINUOUS
Status: DISCONTINUED | OUTPATIENT
Start: 2024-09-10 | End: 2024-09-14 | Stop reason: HOSPADM

## 2024-09-10 RX ORDER — LIDOCAINE HYDROCHLORIDE 20 MG/ML
INJECTION, SOLUTION EPIDURAL; INFILTRATION; INTRACAUDAL; PERINEURAL AS NEEDED
Status: DISCONTINUED | OUTPATIENT
Start: 2024-09-10 | End: 2024-09-10

## 2024-09-10 RX ORDER — LIDOCAINE HYDROCHLORIDE 10 MG/ML
0.5 INJECTION, SOLUTION EPIDURAL; INFILTRATION; INTRACAUDAL; PERINEURAL ONCE AS NEEDED
Status: DISCONTINUED | OUTPATIENT
Start: 2024-09-10 | End: 2024-09-14 | Stop reason: HOSPADM

## 2024-09-10 RX ADMIN — SODIUM CHLORIDE, SODIUM LACTATE, POTASSIUM CHLORIDE, AND CALCIUM CHLORIDE: .6; .31; .03; .02 INJECTION, SOLUTION INTRAVENOUS at 11:50

## 2024-09-10 RX ADMIN — PROPOFOL 35 MG: 10 INJECTION, EMULSION INTRAVENOUS at 12:23

## 2024-09-10 RX ADMIN — PROPOFOL 100 MG: 10 INJECTION, EMULSION INTRAVENOUS at 12:19

## 2024-09-10 RX ADMIN — PROPOFOL 75 MG: 10 INJECTION, EMULSION INTRAVENOUS at 12:20

## 2024-09-10 RX ADMIN — PROPOFOL 35 MG: 10 INJECTION, EMULSION INTRAVENOUS at 12:25

## 2024-09-10 RX ADMIN — LIDOCAINE HYDROCHLORIDE 100 MG: 20 INJECTION, SOLUTION EPIDURAL; INFILTRATION; INTRACAUDAL at 12:18

## 2024-09-10 NOTE — ANESTHESIA PREPROCEDURE EVALUATION
Procedure:  EGD    Relevant Problems   NEURO/PSYCH   (+) Anxiety   (+) Generalized anxiety disorder with panic attacks        Physical Exam    Airway    Mallampati score: I  TM Distance: >3 FB  Neck ROM: full     Dental   No notable dental hx     Cardiovascular  Rhythm: regular, Rate: normal, Cardiovascular exam normal    Pulmonary  Pulmonary exam normal Breath sounds clear to auscultation    Other Findings  post-pubertal.      Anesthesia Plan  ASA Score- 2     Anesthesia Type- IV sedation with anesthesia with ASA Monitors.         Additional Monitors:     Airway Plan:     Comment: Discussed risks/benefits, including medication reactions, awareness, aspiration, and serious/life threatening complications. Plan to maintain native airway with IVGA, monitored with EtCO2.       Plan Factors-Exercise tolerance (METS): >4 METS.    Chart reviewed.    Patient summary reviewed.      Patient instructed to abstain from smoking on day of procedure. Patient did not smoke on day of surgery.            Induction- intravenous.    Postoperative Plan-         Informed Consent- Anesthetic plan and risks discussed with patient.  I personally reviewed this patient with the CRNA. Discussed and agreed on the Anesthesia Plan with the CRNA..
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
## 2024-09-10 NOTE — ANESTHESIA POSTPROCEDURE EVALUATION
Post-Op Assessment Note    CV Status:  Stable  Pain Score: 0    Pain management: adequate       Mental Status:  Sleepy and arousable   Hydration Status:  Euvolemic   PONV Controlled:  Controlled   Airway Patency:  Patent     Post Op Vitals Reviewed: Yes    No anethesia notable event occurred.    Staff: DESIREE               /53 (09/10/24 1229)    Temp 98.8 °F (37.1 °C) (09/10/24 1229)    Pulse 72 (09/10/24 1229)   Resp 18 (09/10/24 1229)    SpO2 97 % (09/10/24 1229)

## 2024-09-13 ENCOUNTER — HOSPITAL ENCOUNTER (OUTPATIENT)
Dept: NUCLEAR MEDICINE | Facility: HOSPITAL | Age: 44
Discharge: HOME | End: 2024-09-13
Attending: INTERNAL MEDICINE
Payer: COMMERCIAL

## 2024-09-13 DIAGNOSIS — R10.11 RIGHT UPPER QUADRANT ABDOMINAL PAIN: ICD-10-CM

## 2024-09-13 PROCEDURE — A9537 TC99M MEBROFENIN: HCPCS

## 2024-09-13 PROCEDURE — 78227 HEPATOBIL SYST IMAGE W/DRUG: CPT

## 2024-09-13 RX ORDER — SINCALIDE 5 UG/5ML
0.02 INJECTION, POWDER, LYOPHILIZED, FOR SOLUTION INTRAVENOUS
Status: COMPLETED | OUTPATIENT
Start: 2024-09-13 | End: 2024-09-13

## 2024-09-13 RX ADMIN — SINCALIDE 1.2 MCG: 5 INJECTION, POWDER, LYOPHILIZED, FOR SOLUTION INTRAVENOUS at 12:40

## 2024-09-16 ENCOUNTER — TREATMENT (OUTPATIENT)
Dept: GASTROENTEROLOGY | Facility: CLINIC | Age: 44
End: 2024-09-16

## 2024-09-16 DIAGNOSIS — K82.8 BILIARY DYSKINESIA: Primary | ICD-10-CM

## 2024-09-16 PROCEDURE — 88305 TISSUE EXAM BY PATHOLOGIST: CPT | Performed by: PATHOLOGY

## 2024-09-19 ENCOUNTER — TELEPHONE (OUTPATIENT)
Age: 44
End: 2024-09-19

## 2024-09-19 NOTE — TELEPHONE ENCOUNTER
[facility] calling to check on ordering provider for patient's mammo and breast u/s. PCP has changed within the last year and they are clarifying who results should be sent to. Clarified patient has Dr. Mcneil listed as her PCP. They are making sure patient has had follow up mammogram this year. I advised patient was seen in August at [facility]. She will call there for further assistance retrieving records.

## 2024-10-01 ENCOUNTER — OFFICE VISIT (OUTPATIENT)
Dept: SURGERY | Facility: CLINIC | Age: 44
End: 2024-10-01
Payer: COMMERCIAL

## 2024-10-01 VITALS
WEIGHT: 137 LBS | HEART RATE: 66 BPM | DIASTOLIC BLOOD PRESSURE: 57 MMHG | HEIGHT: 63 IN | RESPIRATION RATE: 12 BRPM | SYSTOLIC BLOOD PRESSURE: 98 MMHG | BODY MASS INDEX: 24.27 KG/M2 | TEMPERATURE: 98.2 F | OXYGEN SATURATION: 97 %

## 2024-10-01 DIAGNOSIS — K82.8 BILIARY DYSKINESIA: ICD-10-CM

## 2024-10-01 PROCEDURE — 99244 OFF/OP CNSLTJ NEW/EST MOD 40: CPT | Performed by: SURGERY
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
## 2024-10-07 ENCOUNTER — ANESTHESIA EVENT (OUTPATIENT)
Dept: PERIOP | Facility: HOSPITAL | Age: 44
End: 2024-10-07
Payer: COMMERCIAL

## 2024-10-07 RX ORDER — IBUPROFEN 200 MG
TABLET ORAL EVERY 6 HOURS PRN
COMMUNITY

## 2024-10-07 NOTE — PRE-PROCEDURE INSTRUCTIONS
Pre-Surgery Instructions:   Medication Instructions    dicyclomine (BENTYL) 20 mg tablet Uses PRN- OK to take day of surgery    ibuprofen (MOTRIN) 200 mg tablet Stop taking 7 days prior to surgery.    Medication instructions for day surgery reviewed. Please use only a sip of water to take your instructed medications. Avoid aspirin and all over the counter vitamins, supplements and NSAIDS for one week prior to surgery per anesthesia guidelines. Tylenol is ok to take as needed.     You will receive a call one business day prior to surgery with an arrival time and hospital directions. If your surgery is scheduled on a Monday, the hospital will be calling you on the Friday prior to your surgery. If you have not heard from anyone by 8pm, please call the hospital supervisor through the hospital  at [phone]. ([place] [phone] or [place] [phone]).    Do not eat or drink anything after midnight the night before your surgery, including candy, mints, lifesavers, or chewing gum. Do not drink alcohol 24hrs before your surgery. Try not to smoke at least 24hrs before your surgery.       Follow the pre surgery showering instructions as listed in the “My Surgical Experience Booklet” or otherwise provided by your surgeon's office. Do not use a blade to shave the surgical area 1 week before surgery. It is okay to use a clean electric clippers up to 24 hours before surgery. Do not apply any lotions, creams, including makeup, cologne, deodorant, or perfumes after showering on the day of your surgery. Do not use dry shampoo, hair spray, hair gel, or any type of hair products.     No contact lenses, eye make-up, or artificial eyelashes. Remove nail polish, including gel polish, and any artificial, gel, or acrylic nails if possible. Remove all jewelry including rings and body piercing jewelry.     Wear causal clothing that is easy to take on and off. Consider your type of surgery.    Keep any valuables, jewelry,  piercings at home. Please bring any specially ordered equipment (sling, braces) if indicated.    Arrange for a responsible person to drive you to and from the hospital on the day of your surgery. Please confirm the visitor policy for the day of your procedure when you receive your phone call with an arrival time.     Call the surgeon's office with any new illnesses, exposures, or additional questions prior to surgery.    Please reference your “My Surgical Experience Booklet” for additional information to prepare for your upcoming surgery.

## 2024-10-10 ENCOUNTER — HOSPITAL ENCOUNTER (OUTPATIENT)
Facility: HOSPITAL | Age: 44
Setting detail: OUTPATIENT SURGERY
Discharge: HOME | End: 2024-10-10
Attending: SURGERY | Admitting: SURGERY
Payer: COMMERCIAL

## 2024-10-10 ENCOUNTER — ANESTHESIA (OUTPATIENT)
Dept: PERIOP | Facility: HOSPITAL | Age: 44
End: 2024-10-10
Payer: COMMERCIAL

## 2024-10-10 VITALS
HEIGHT: 63 IN | DIASTOLIC BLOOD PRESSURE: 54 MMHG | WEIGHT: 135 LBS | BODY MASS INDEX: 23.92 KG/M2 | HEART RATE: 83 BPM | RESPIRATION RATE: 16 BRPM | SYSTOLIC BLOOD PRESSURE: 123 MMHG | TEMPERATURE: 97.6 F | OXYGEN SATURATION: 99 %

## 2024-10-10 DIAGNOSIS — K82.8 BILIARY DYSKINESIA: ICD-10-CM

## 2024-10-10 DIAGNOSIS — K81.9 CHOLECYSTITIS: ICD-10-CM

## 2024-10-10 LAB
EXT PREGNANCY TEST URINE: NEGATIVE
EXT. CONTROL: NORMAL

## 2024-10-10 PROCEDURE — 47562 LAPAROSCOPIC CHOLECYSTECTOMY: CPT

## 2024-10-10 PROCEDURE — S2900 ROBOTIC SURGICAL SYSTEM: HCPCS | Performed by: SURGERY

## 2024-10-10 PROCEDURE — 88304 TISSUE EXAM BY PATHOLOGIST: CPT | Performed by: PATHOLOGY

## 2024-10-10 PROCEDURE — 81025 URINE PREGNANCY TEST: CPT | Performed by: SURGERY

## 2024-10-10 PROCEDURE — 47562 LAPAROSCOPIC CHOLECYSTECTOMY: CPT | Performed by: SURGERY

## 2024-10-10 RX ORDER — INDOCYANINE GREEN AND WATER 25 MG
KIT INJECTION AS NEEDED
Status: DISCONTINUED | OUTPATIENT
Start: 2024-10-10 | End: 2024-10-10

## 2024-10-10 RX ORDER — LIDOCAINE HYDROCHLORIDE 10 MG/ML
INJECTION, SOLUTION EPIDURAL; INFILTRATION; INTRACAUDAL; PERINEURAL AS NEEDED
Status: DISCONTINUED | OUTPATIENT
Start: 2024-10-10 | End: 2024-10-10

## 2024-10-10 RX ORDER — MAGNESIUM HYDROXIDE 1200 MG/15ML
LIQUID ORAL AS NEEDED
Status: DISCONTINUED | OUTPATIENT
Start: 2024-10-10 | End: 2024-10-10 | Stop reason: HOSPADM

## 2024-10-10 RX ORDER — CEFAZOLIN SODIUM 2 G/50ML
2000 SOLUTION INTRAVENOUS
Status: DISCONTINUED | OUTPATIENT
Start: 2024-10-11 | End: 2024-10-10 | Stop reason: HOSPADM

## 2024-10-10 RX ORDER — FENTANYL CITRATE/PF 50 MCG/ML
25 SYRINGE (ML) INJECTION
Status: COMPLETED | OUTPATIENT
Start: 2024-10-10 | End: 2024-10-10

## 2024-10-10 RX ORDER — BUPIVACAINE HYDROCHLORIDE AND EPINEPHRINE 5; 5 MG/ML; UG/ML
INJECTION, SOLUTION PERINEURAL AS NEEDED
Status: DISCONTINUED | OUTPATIENT
Start: 2024-10-10 | End: 2024-10-10 | Stop reason: HOSPADM

## 2024-10-10 RX ORDER — FENTANYL CITRATE 50 UG/ML
INJECTION, SOLUTION INTRAMUSCULAR; INTRAVENOUS AS NEEDED
Status: DISCONTINUED | OUTPATIENT
Start: 2024-10-10 | End: 2024-10-10

## 2024-10-10 RX ORDER — DEXAMETHASONE SODIUM PHOSPHATE 10 MG/ML
INJECTION, SOLUTION INTRAMUSCULAR; INTRAVENOUS AS NEEDED
Status: DISCONTINUED | OUTPATIENT
Start: 2024-10-10 | End: 2024-10-10

## 2024-10-10 RX ORDER — SODIUM CHLORIDE, SODIUM LACTATE, POTASSIUM CHLORIDE, CALCIUM CHLORIDE 600; 310; 30; 20 MG/100ML; MG/100ML; MG/100ML; MG/100ML
125 INJECTION, SOLUTION INTRAVENOUS CONTINUOUS
Status: DISCONTINUED | OUTPATIENT
Start: 2024-10-10 | End: 2024-10-10 | Stop reason: HOSPADM

## 2024-10-10 RX ORDER — ROCURONIUM BROMIDE 10 MG/ML
INJECTION, SOLUTION INTRAVENOUS AS NEEDED
Status: DISCONTINUED | OUTPATIENT
Start: 2024-10-10 | End: 2024-10-10

## 2024-10-10 RX ORDER — HYDROMORPHONE HCL/PF 1 MG/ML
0.2 SYRINGE (ML) INJECTION EVERY 4 HOURS PRN
Status: DISCONTINUED | OUTPATIENT
Start: 2024-10-10 | End: 2024-10-10 | Stop reason: HOSPADM

## 2024-10-10 RX ORDER — MIDAZOLAM HYDROCHLORIDE 2 MG/2ML
INJECTION, SOLUTION INTRAMUSCULAR; INTRAVENOUS AS NEEDED
Status: DISCONTINUED | OUTPATIENT
Start: 2024-10-10 | End: 2024-10-10

## 2024-10-10 RX ORDER — CEFAZOLIN SODIUM 1 G/3ML
INJECTION, POWDER, FOR SOLUTION INTRAMUSCULAR; INTRAVENOUS AS NEEDED
Status: DISCONTINUED | OUTPATIENT
Start: 2024-10-10 | End: 2024-10-10

## 2024-10-10 RX ORDER — PROPOFOL 10 MG/ML
INJECTION, EMULSION INTRAVENOUS AS NEEDED
Status: DISCONTINUED | OUTPATIENT
Start: 2024-10-10 | End: 2024-10-10

## 2024-10-10 RX ORDER — IBUPROFEN 600 MG/1
600 TABLET, FILM COATED ORAL EVERY 6 HOURS PRN
Status: DISCONTINUED | OUTPATIENT
Start: 2024-10-10 | End: 2024-10-10 | Stop reason: HOSPADM

## 2024-10-10 RX ORDER — ONDANSETRON 2 MG/ML
INJECTION INTRAMUSCULAR; INTRAVENOUS AS NEEDED
Status: DISCONTINUED | OUTPATIENT
Start: 2024-10-10 | End: 2024-10-10

## 2024-10-10 RX ORDER — METOCLOPRAMIDE HYDROCHLORIDE 5 MG/ML
10 INJECTION INTRAMUSCULAR; INTRAVENOUS ONCE AS NEEDED
Status: DISCONTINUED | OUTPATIENT
Start: 2024-10-10 | End: 2024-10-10 | Stop reason: HOSPADM

## 2024-10-10 RX ORDER — ONDANSETRON 2 MG/ML
4 INJECTION INTRAMUSCULAR; INTRAVENOUS ONCE AS NEEDED
Status: DISCONTINUED | OUTPATIENT
Start: 2024-10-10 | End: 2024-10-10 | Stop reason: HOSPADM

## 2024-10-10 RX ORDER — OXYCODONE HYDROCHLORIDE 5 MG/1
5 TABLET ORAL EVERY 4 HOURS PRN
Qty: 10 TABLET | Refills: 0 | Status: SHIPPED | OUTPATIENT
Start: 2024-10-10

## 2024-10-10 RX ORDER — HYDROCODONE BITARTRATE AND ACETAMINOPHEN 5; 325 MG/1; MG/1
1 TABLET ORAL EVERY 4 HOURS PRN
Status: DISCONTINUED | OUTPATIENT
Start: 2024-10-10 | End: 2024-10-10 | Stop reason: HOSPADM

## 2024-10-10 RX ADMIN — PROPOFOL 60 MG: 10 INJECTION, EMULSION INTRAVENOUS at 14:18

## 2024-10-10 RX ADMIN — PHENYLEPHRINE HYDROCHLORIDE 20 MCG/MIN: 10 INJECTION INTRAVENOUS at 14:26

## 2024-10-10 RX ADMIN — LIDOCAINE HYDROCHLORIDE 50 MG: 10 INJECTION, SOLUTION EPIDURAL; INFILTRATION; INTRACAUDAL; PERINEURAL at 14:16

## 2024-10-10 RX ADMIN — FENTANYL CITRATE 50 MCG: 50 INJECTION INTRAMUSCULAR; INTRAVENOUS at 14:16

## 2024-10-10 RX ADMIN — DEXAMETHASONE SODIUM PHOSPHATE 10 MG: 10 INJECTION, SOLUTION INTRAMUSCULAR; INTRAVENOUS at 14:17

## 2024-10-10 RX ADMIN — FENTANYL CITRATE 25 MCG: 50 INJECTION INTRAMUSCULAR; INTRAVENOUS at 16:04

## 2024-10-10 RX ADMIN — SODIUM CHLORIDE, SODIUM LACTATE, POTASSIUM CHLORIDE, AND CALCIUM CHLORIDE 125 ML/HR: .6; .31; .03; .02 INJECTION, SOLUTION INTRAVENOUS at 12:55

## 2024-10-10 RX ADMIN — FENTANYL CITRATE 50 MCG: 50 INJECTION INTRAMUSCULAR; INTRAVENOUS at 14:20

## 2024-10-10 RX ADMIN — DEXMEDETOMIDINE HYDROCHLORIDE 8 MCG: 100 INJECTION, SOLUTION INTRAVENOUS at 14:54

## 2024-10-10 RX ADMIN — FENTANYL CITRATE 50 MCG: 50 INJECTION INTRAMUSCULAR; INTRAVENOUS at 15:33

## 2024-10-10 RX ADMIN — MIDAZOLAM 2 MG: 1 INJECTION INTRAMUSCULAR; INTRAVENOUS at 14:07

## 2024-10-10 RX ADMIN — ROCURONIUM BROMIDE 50 MG: 10 INJECTION, SOLUTION INTRAVENOUS at 14:17

## 2024-10-10 RX ADMIN — PHENYLEPHRINE HYDROCHLORIDE 100 MCG: 10 INJECTION INTRAVENOUS at 14:27

## 2024-10-10 RX ADMIN — SUGAMMADEX 200 MG: 100 INJECTION, SOLUTION INTRAVENOUS at 15:23

## 2024-10-10 RX ADMIN — FENTANYL CITRATE 25 MCG: 50 INJECTION INTRAMUSCULAR; INTRAVENOUS at 15:57

## 2024-10-10 RX ADMIN — ROCURONIUM BROMIDE 20 MG: 10 INJECTION, SOLUTION INTRAVENOUS at 14:40

## 2024-10-10 RX ADMIN — ONDANSETRON 4 MG: 2 INJECTION INTRAMUSCULAR; INTRAVENOUS at 15:01

## 2024-10-10 RX ADMIN — FENTANYL CITRATE 50 MCG: 50 INJECTION INTRAMUSCULAR; INTRAVENOUS at 14:45

## 2024-10-10 RX ADMIN — CEFAZOLIN 2000 MG: 1 INJECTION, POWDER, FOR SOLUTION INTRAMUSCULAR; INTRAVENOUS at 14:22

## 2024-10-10 RX ADMIN — FENTANYL CITRATE 25 MCG: 50 INJECTION INTRAMUSCULAR; INTRAVENOUS at 15:43

## 2024-10-10 RX ADMIN — IBUPROFEN 600 MG: 600 TABLET, FILM COATED ORAL at 17:30

## 2024-10-10 RX ADMIN — PROPOFOL 140 MG: 10 INJECTION, EMULSION INTRAVENOUS at 14:16

## 2024-10-10 RX ADMIN — INDOCYANINE GREEN AND WATER 2.5 MG: KIT at 14:07

## 2024-10-10 RX ADMIN — FENTANYL CITRATE 25 MCG: 50 INJECTION INTRAMUSCULAR; INTRAVENOUS at 15:48

## 2024-10-10 NOTE — ANESTHESIA POSTPROCEDURE EVALUATION
Post-Op Assessment Note    CV Status:  Stable  Pain Score: 2    Pain management: adequate       Mental Status:  Sleepy and arousable   PONV Controlled:  None   Airway Patency:  Patent     Post Op Vitals Reviewed: Yes    No anethesia notable event occurred.    Staff: CRNA           Last Filed PACU Vitals:  Vitals Value Taken Time   Temp 98.5 °F (36.9 °C) 10/10/24 1539   Pulse 84 10/10/24 1540   /65 10/10/24 1539   Resp 33 10/10/24 1540   SpO2 100 % 10/10/24 1540   Vitals shown include unfiled device data.    Modified Tracie:  No data recorded

## 2024-10-10 NOTE — INTERVAL H&P NOTE
H&P reviewed. After examining the patient I find no changes in the patients condition since the H&P had been written.  Plan: robotic cholecystectomy

## 2024-10-10 NOTE — DISCHARGE INSTR - AVS FIRST PAGE
After Surgery Instructions    ???When you return home, you may eat whatever you feel comfortable eating. It is common to not have much of an appetite. Do not force yourself to eat. Your appetite will usually return in 1 or 2 weeks. Some foods may still not agree with you, but this will usually pass in 4 to 6 weeks.  ???Resume all of your regular medications, including blood thinners and aspirin, after going home.  ???The day after surgery you may remove the dressings. Leave any skin tapes on the incisions in place. A dressing is then optional.  ???You may shower the day after surgery. Plain soap and water is fine. Special soaps, ointments, alcohol or peroxide is not necessary. No swimming in pools for 5 days, and do not go in the ocean until seen in the office.  ???You may become constipated, especially if taking pain medications, for the first 3 or 4 days. You may take any over the counter laxative. Win Milk of Magnesia is good to start.  ???You will be given a prescription for pain medication. Take it as needed only. You may also take any over the counter medication for more mild pain.  ???Immediately after surgery, you may ride in a car, climb steps and walk as tolerated. When you are pain free, it is Ok to drive. Be aware that you will tire out very easily for the first few days.  ???Do not lift anything over 15 pounds for one week. Otherwise, there are not specific limitations to your activity and you may resume normal activity as tolerated.   After surgery you may return back to work on 1-2 weeks if comfortable      Contact [facility] at [phone] if any of the following occur:    ???A fever over 101° that does not respond to Tylenol. A low grade fever is not unusual after surgery and is not necessarily a sign of infection.  ???Increasing abdominal pain. Some pain after surgery is normal. Pain that was improving but has now gotten increasingly worse may not be normal and should be  reported  ???Persistent nausea and vomiting.      IF YOU HAVE ANY QUESTIONS OR CONCERNS PLEASE FEEL FREE TO CONTACT US AT ANY TIME.

## 2024-10-10 NOTE — ANESTHESIA PREPROCEDURE EVALUATION
Procedure:  CHOLECYSTECTOMY LAPAROSCOPIC W/ ROBOTICS (POSSIBLE OPEN) (Abdomen)    Relevant Problems   ANESTHESIA (within normal limits)      CARDIO (within normal limits)   (-) NICOLAS (dyspnea on exertion)      ENDO (within normal limits)      GI/HEPATIC (within normal limits)      /RENAL (within normal limits)      GYN (within normal limits)      MUSCULOSKELETAL (within normal limits)      NEURO/PSYCH   (+) Anxiety   (+) Generalized anxiety disorder with panic attacks      PULMONARY (within normal limits)        Physical Exam    Airway    Mallampati score: II  TM Distance: >3 FB  Neck ROM: full     Dental   No notable dental hx     Cardiovascular  Rhythm: regular, Rate: normal    Pulmonary   Breath sounds clear to auscultation    Other Findings  post-pubertal.      Anesthesia Plan  ASA Score- 1     Anesthesia Type- general with ASA Monitors.         Additional Monitors:     Airway Plan: ETT.           Plan Factors-Exercise tolerance (METS): >4 METS.    Chart reviewed.        Patient is not a current smoker.      Obstructive sleep apnea risk education given perioperatively.        Induction- intravenous.    Postoperative Plan- Plan for postoperative opioid use.     Perioperative Resuscitation Plan - Level 1 - Full Code.       Informed Consent- Anesthetic plan and risks discussed with patient.  I personally reviewed this patient with the CRNA. Discussed and agreed on the Anesthesia Plan with the CRNA..

## 2024-10-10 NOTE — OP NOTE
OPERATIVE REPORT  PATIENT NAME: Chelo REDMAN Ems    :  1980  MRN: [id number]  Pt Location: [facility] OR ROOM 14    SURGERY DATE: 10/10/2024    Surgeons and Role:     * Guido Webb MD - Primary     * Anne Funes PA-C - Assisting     * Donis Joiner MD - Assisting    Preop Diagnosis:  Cholecystitis [K81.9]  Biliary dyskinesia [K82.8]    Post-Op Diagnosis Codes:     * Cholecystitis [K81.9]     * Biliary dyskinesia [K82.8]    Procedure(s):  ROBOTIC LAPAROSCOPIC CHOLECYSTECTOMY    Specimen(s):  ID Type Source Tests Collected by Time Destination   1 : GALLBLADDER Tissue Gallbladder TISSUE EXAM Guido Webb MD 10/10/2024 1500        Estimated Blood Loss:   Minimal    Drains:  * No LDAs found *    Anesthesia Type:   General    Operative Indications:  .  Biliary dyskinesia [K82.8]      Operative Findings:  Multiple adhesions to the undersurface of the gallbladder      Complications:   None    Procedure and Technique:  The patient was identified by visualization conversation on the operating room table.  After satisfactory induction of general anesthesia, the patient's abdomen was prepped and draped in usual sterile fashion.  Timeout taken.  Local anesthesia of 0.5% Marcaine was infiltrated to skin subcutaneous tissue and deeper layers tissue at the inferior edge of the umbilicus.  Knife was used incision made.  A Veress needle introduced into the abdominal cavity.  Carbon dioxide was insufflated to pressure 15.  An 8 mm robotic cannula was introduced.  Laparoscope was placed.  No injury seen.  In the left side of the abdomen, local anesthesia was infiltrated incision made and 12 mm trocar introduced.  On the right abdomen, local anesthesia infiltrated incision made and 8 mm robotic cannula introduced.  The robot was then docked to the appropriate cannulas.  The gallbladder was lifted up and there were noted to be a number of adhesions to the undersurface of the gallbladder.  These were sharply and bluntly  taken down with a hook.  The grasper on the gallbladder was readjusted and grasped further down on the gallbladder.  ICG was used and the location of the common duct is easily visualized.  The end of the gallbladder the peritoneum overlying this was incised with the hook and blunt dissection was carried out again using ICG as needed and the cystic duct came into view.  Posterior to this was the artery.  The artery was dissected free cauterized and divided.  The cystic duct was dissected out doubly clipped towards common duct side once towards gallbladder and side and divided with scissors.  The gallbladder was then lifted up and cauterized free from the gallbladder bed.  An inadvertent opening was made and some of the bile was spilled.  The gallbladder was eventually removed handed off in a bag.  The abdominal cavity was copiously irrigated.  The robot was disconnected.  The fascia of the 12 mm trocar was closed using a suture passer and 0 Vicryl suture.  The skin of the incisions was closed with subcuticular 4-0 Monocryl.  Glue dressing applied instrument needle sponge counts were correct.RF wanding was clear.  The patient was awakened taken recovery satisfactorily.   I was present for the entire procedure. and A physician assistant was required during the procedure for retraction, tissue handling, dissection and suturing.    Patient Disposition:  PACU              SIGNATURE: Guido Webb MD  DATE: October 10, 2024  TIME: 3:19 PM

## 2024-10-11 ENCOUNTER — TELEPHONE (OUTPATIENT)
Dept: SURGERY | Facility: CLINIC | Age: 44
End: 2024-10-11

## 2024-10-11 ENCOUNTER — APPOINTMENT (EMERGENCY)
Dept: CT IMAGING | Facility: HOSPITAL | Age: 44
End: 2024-10-11
Payer: COMMERCIAL

## 2024-10-11 ENCOUNTER — HOSPITAL ENCOUNTER (EMERGENCY)
Facility: HOSPITAL | Age: 44
Discharge: HOME | End: 2024-10-11
Attending: EMERGENCY MEDICINE
Payer: COMMERCIAL

## 2024-10-11 VITALS
WEIGHT: 138.01 LBS | TEMPERATURE: 98.8 F | OXYGEN SATURATION: 98 % | RESPIRATION RATE: 16 BRPM | SYSTOLIC BLOOD PRESSURE: 110 MMHG | DIASTOLIC BLOOD PRESSURE: 60 MMHG | HEART RATE: 77 BPM | BODY MASS INDEX: 24.45 KG/M2

## 2024-10-11 DIAGNOSIS — G89.18 POSTOPERATIVE PAIN: Primary | ICD-10-CM

## 2024-10-11 LAB
ALBUMIN SERPL BCG-MCNC: 4.6 G/DL (ref 3.5–5)
ALP SERPL-CCNC: 49 U/L (ref 34–104)
ALT SERPL W P-5'-P-CCNC: 36 U/L (ref 7–52)
ANION GAP SERPL CALCULATED.3IONS-SCNC: 8 MMOL/L (ref 4–13)
AST SERPL W P-5'-P-CCNC: 41 U/L (ref 13–39)
BASOPHILS # BLD AUTO: 0.05 THOUSANDS/ΜL (ref 0–0.1)
BASOPHILS NFR BLD AUTO: 0 % (ref 0–1)
BILIRUB DIRECT SERPL-MCNC: 0.05 MG/DL (ref 0–0.2)
BILIRUB SERPL-MCNC: 0.44 MG/DL (ref 0.2–1)
BUN SERPL-MCNC: 11 MG/DL (ref 5–25)
CALCIUM SERPL-MCNC: 9.4 MG/DL (ref 8.4–10.2)
CHLORIDE SERPL-SCNC: 103 MMOL/L (ref 96–108)
CO2 SERPL-SCNC: 26 MMOL/L (ref 21–32)
CREAT SERPL-MCNC: 0.7 MG/DL (ref 0.6–1.3)
EOSINOPHIL # BLD AUTO: 0.05 THOUSAND/ΜL (ref 0–0.61)
EOSINOPHIL NFR BLD AUTO: 0 % (ref 0–6)
ERYTHROCYTE [DISTWIDTH] IN BLOOD BY AUTOMATED COUNT: 12.1 % (ref 11.6–15.1)
GFR SERPL CREATININE-BSD FRML MDRD: 107 ML/MIN/1.73SQ M
GLUCOSE SERPL-MCNC: 112 MG/DL (ref 65–140)
HCT VFR BLD AUTO: 42.6 % (ref 34.8–46.1)
HGB BLD-MCNC: 14.4 G/DL (ref 11.5–15.4)
IMM GRANULOCYTES # BLD AUTO: 0.1 THOUSAND/UL (ref 0–0.2)
IMM GRANULOCYTES NFR BLD AUTO: 1 % (ref 0–2)
LIPASE SERPL-CCNC: 31 U/L (ref 11–82)
LYMPHOCYTES # BLD AUTO: 2.27 THOUSANDS/ΜL (ref 0.6–4.47)
LYMPHOCYTES NFR BLD AUTO: 12 % (ref 14–44)
MCH RBC QN AUTO: 30.5 PG (ref 26.8–34.3)
MCHC RBC AUTO-ENTMCNC: 33.8 G/DL (ref 31.4–37.4)
MCV RBC AUTO: 90 FL (ref 82–98)
MONOCYTES # BLD AUTO: 1.43 THOUSAND/ΜL (ref 0.17–1.22)
MONOCYTES NFR BLD AUTO: 8 % (ref 4–12)
NEUTROPHILS # BLD AUTO: 14.4 THOUSANDS/ΜL (ref 1.85–7.62)
NEUTS SEG NFR BLD AUTO: 79 % (ref 43–75)
NRBC BLD AUTO-RTO: 0 /100 WBCS
PLATELET # BLD AUTO: 298 THOUSANDS/UL (ref 149–390)
PMV BLD AUTO: 9.4 FL (ref 8.9–12.7)
POTASSIUM SERPL-SCNC: 4.7 MMOL/L (ref 3.5–5.3)
PROT SERPL-MCNC: 7.7 G/DL (ref 6.4–8.4)
RBC # BLD AUTO: 4.72 MILLION/UL (ref 3.81–5.12)
SODIUM SERPL-SCNC: 137 MMOL/L (ref 135–147)
WBC # BLD AUTO: 18.3 THOUSAND/UL (ref 4.31–10.16)

## 2024-10-11 PROCEDURE — 96361 HYDRATE IV INFUSION ADD-ON: CPT

## 2024-10-11 PROCEDURE — 99285 EMERGENCY DEPT VISIT HI MDM: CPT | Performed by: EMERGENCY MEDICINE

## 2024-10-11 PROCEDURE — 83690 ASSAY OF LIPASE: CPT | Performed by: EMERGENCY MEDICINE

## 2024-10-11 PROCEDURE — 82248 BILIRUBIN DIRECT: CPT | Performed by: EMERGENCY MEDICINE

## 2024-10-11 PROCEDURE — 99283 EMERGENCY DEPT VISIT LOW MDM: CPT

## 2024-10-11 PROCEDURE — 96375 TX/PRO/DX INJ NEW DRUG ADDON: CPT

## 2024-10-11 PROCEDURE — 96374 THER/PROPH/DIAG INJ IV PUSH: CPT

## 2024-10-11 PROCEDURE — 80053 COMPREHEN METABOLIC PANEL: CPT | Performed by: EMERGENCY MEDICINE

## 2024-10-11 PROCEDURE — 36415 COLL VENOUS BLD VENIPUNCTURE: CPT | Performed by: EMERGENCY MEDICINE

## 2024-10-11 PROCEDURE — 74177 CT ABD & PELVIS W/CONTRAST: CPT

## 2024-10-11 PROCEDURE — 85025 COMPLETE CBC W/AUTO DIFF WBC: CPT | Performed by: EMERGENCY MEDICINE

## 2024-10-11 RX ORDER — MORPHINE SULFATE 4 MG/ML
4 INJECTION, SOLUTION INTRAMUSCULAR; INTRAVENOUS ONCE
Status: COMPLETED | OUTPATIENT
Start: 2024-10-11 | End: 2024-10-11

## 2024-10-11 RX ORDER — ONDANSETRON 2 MG/ML
4 INJECTION INTRAMUSCULAR; INTRAVENOUS ONCE
Status: COMPLETED | OUTPATIENT
Start: 2024-10-11 | End: 2024-10-11

## 2024-10-11 RX ADMIN — MORPHINE SULFATE 4 MG: 4 INJECTION INTRAVENOUS at 16:54

## 2024-10-11 RX ADMIN — IOHEXOL 50 ML: 350 INJECTION, SOLUTION INTRAVENOUS at 17:40

## 2024-10-11 RX ADMIN — ONDANSETRON 4 MG: 2 INJECTION INTRAMUSCULAR; INTRAVENOUS at 16:54

## 2024-10-11 RX ADMIN — SODIUM CHLORIDE 1000 ML: 0.9 INJECTION, SOLUTION INTRAVENOUS at 16:56

## 2024-10-11 NOTE — ANESTHESIA POSTPROCEDURE EVALUATION
Post-Op Assessment Note    Last Filed PACU Vitals:  Vitals Value Taken Time   Temp 98.5 °F (36.9 °C) 10/10/24 1539   Pulse 86 10/10/24 1618   /60 10/10/24 1615   Resp 30 10/10/24 1618   SpO2 99 % 10/10/24 1618   Vitals shown include unfiled device data.    Modified Tracie:  Activity: 2 (10/10/2024  4:15 PM)  Respiration: 2 (10/10/2024  4:15 PM)  Circulation: 2 (10/10/2024  4:15 PM)  Consciousness: 1 (10/10/2024  4:15 PM)  Oxygen Saturation: 2 (10/10/2024  4:15 PM)  Modified Tracie Score: 9 (10/10/2024  4:15 PM)
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
## 2024-10-11 NOTE — TELEPHONE ENCOUNTER
Pt is s/p jennifer israel 10/10 with Dr. Diggs.  Pt's  called, stating pt is in excrutiating pain, just began.  Pt has not taken oxycodone; just tylenol.  Pt insistent on pain level; advised pt to ER; pt will do.  mb

## 2024-10-11 NOTE — DISCHARGE INSTRUCTIONS
You may use the pain medication prescribed by your surgeon for severe pain.    Return to the emergency department for nonstop vomiting, high fevers or chills, inability to pass stool or gas, or any other new or concerning symptom

## 2024-10-15 PROCEDURE — 88304 TISSUE EXAM BY PATHOLOGIST: CPT | Performed by: PATHOLOGY

## 2024-10-23 ENCOUNTER — TELEMEDICINE (OUTPATIENT)
Dept: SURGERY | Facility: CLINIC | Age: 44
End: 2024-10-23

## 2024-10-23 DIAGNOSIS — Z90.49 STATUS POST LAPAROSCOPIC CHOLECYSTECTOMY: Primary | ICD-10-CM

## 2024-10-23 PROCEDURE — 99024 POSTOP FOLLOW-UP VISIT: CPT | Performed by: SURGERY

## 2024-10-23 NOTE — PROGRESS NOTES
Ambulatory Visit  Name: Chelo Boykin      : 1980      MRN: [id number]  Encounter Provider: Guido Webb MD  Encounter Date: 10/23/2024   Encounter department: [facility] GENERAL SURGERY [place]    Assessment & Plan  Status post laparoscopic cholecystectomy  This was a phone call visit.  She was doing well except some pain on the left side which I told her is the largest incision this should gradually go away.  Activity as she feels comfortable.  She is back here if needed.         History of Present Illness     Chelo Boykin is a 44 y.o. female who presents on the phone status post robotic cholecystectomy.  Biggest complaint is pain on the left side.  Otherwise her incisions are healing well with the glue dissipating.  Tolerating diet and bowels are working satisfactorily.      Review of Systems        Objective     There were no vitals taken for this visit.    Physical Exam

## 2024-12-13 ENCOUNTER — OFFICE VISIT (OUTPATIENT)
Dept: GYNECOLOGY | Facility: CLINIC | Age: 44
End: 2024-12-13
Payer: COMMERCIAL

## 2024-12-13 ENCOUNTER — NURSE TRIAGE (OUTPATIENT)
Age: 44
End: 2024-12-13

## 2024-12-13 VITALS — WEIGHT: 138 LBS | SYSTOLIC BLOOD PRESSURE: 120 MMHG | DIASTOLIC BLOOD PRESSURE: 64 MMHG | BODY MASS INDEX: 24.45 KG/M2

## 2024-12-13 DIAGNOSIS — R39.9 UTI SYMPTOMS: Primary | ICD-10-CM

## 2024-12-13 DIAGNOSIS — B37.9 YEAST INFECTION: ICD-10-CM

## 2024-12-13 DIAGNOSIS — R10.2 PELVIC PAIN: ICD-10-CM

## 2024-12-13 LAB
BACTERIA UR QL AUTO: ABNORMAL /HPF
BILIRUB UR QL STRIP: NEGATIVE
CLARITY UR: ABNORMAL
COLOR UR: ABNORMAL
GLUCOSE UR STRIP-MCNC: NEGATIVE MG/DL
HGB UR QL STRIP.AUTO: ABNORMAL
KETONES UR STRIP-MCNC: NEGATIVE MG/DL
LEUKOCYTE ESTERASE UR QL STRIP: ABNORMAL
NITRITE UR QL STRIP: NEGATIVE
NON-SQ EPI CELLS URNS QL MICRO: ABNORMAL /HPF
PH UR STRIP.AUTO: 5 [PH]
PROT UR STRIP-MCNC: NEGATIVE MG/DL
RBC #/AREA URNS AUTO: ABNORMAL /HPF
SL AMB  POCT GLUCOSE, UA: ABNORMAL
SL AMB LEUKOCYTE ESTERASE,UA: ABNORMAL
SL AMB POCT BILIRUBIN,UA: ABNORMAL
SL AMB POCT BLOOD,UA: ABNORMAL
SL AMB POCT CLARITY,UA: ABNORMAL
SL AMB POCT COLOR,UA: YELLOW
SL AMB POCT KETONES,UA: ABNORMAL
SL AMB POCT NITRITE,UA: ABNORMAL
SL AMB POCT PH,UA: 6
SL AMB POCT SPECIFIC GRAVITY,UA: 1
SL AMB POCT URINE PROTEIN: ABNORMAL
SL AMB POCT UROBILINOGEN: ABNORMAL
SP GR UR STRIP.AUTO: 1.02 (ref 1–1.03)
UROBILINOGEN UR STRIP-ACNC: <2 MG/DL
WBC #/AREA URNS AUTO: ABNORMAL /HPF

## 2024-12-13 PROCEDURE — 81002 URINALYSIS NONAUTO W/O SCOPE: CPT | Performed by: OBSTETRICS & GYNECOLOGY

## 2024-12-13 PROCEDURE — 81001 URINALYSIS AUTO W/SCOPE: CPT | Performed by: OBSTETRICS & GYNECOLOGY

## 2024-12-13 PROCEDURE — 99213 OFFICE O/P EST LOW 20 MIN: CPT | Performed by: OBSTETRICS & GYNECOLOGY

## 2024-12-13 PROCEDURE — 87086 URINE CULTURE/COLONY COUNT: CPT | Performed by: OBSTETRICS & GYNECOLOGY

## 2024-12-13 RX ORDER — FLUCONAZOLE 150 MG/1
150 TABLET ORAL ONCE
Qty: 2 TABLET | Refills: 0 | Status: SHIPPED | OUTPATIENT
Start: 2024-12-13 | End: 2024-12-13

## 2024-12-13 RX ORDER — CEPHALEXIN 500 MG/1
500 CAPSULE ORAL EVERY 6 HOURS SCHEDULED
Qty: 20 CAPSULE | Refills: 0 | Status: SHIPPED | OUTPATIENT
Start: 2024-12-13 | End: 2024-12-18

## 2024-12-13 NOTE — TELEPHONE ENCOUNTER
"Patient calling in stating that she's having vaginal Light tan/ yellow discharge with odor, and at times pink in color discharge. Pt reporting itching as well. Pt reporting UTI symptoms as well and has pressure feeling with having to urinate. Pt reporting having a late period, pt reporting she took a pregnancy test on 11/25/24 and it was negative, and pt reporting getting her period on 12/3/24. Pt reporting Possible blood in urine, flank pain, pt denies fever, pain with urination       As per protocol, pt is to be seen in the office today, pt is scheduled for 3pm today with Susanne JETT NP.       Reason for Disposition   Side (flank) or lower back pain present    Answer Assessment - Initial Assessment Questions  1. SYMPTOM: \"What's the main symptom you're concerned about?\" (e.g., frequency, incontinence)      Frequency   2. ONSET: \"When did the  symptoms   start?\"      A month   3. PAIN: \"Is there any pain?\" If Yes, ask: \"How bad is it?\" (Scale: 1-10; mild, moderate, severe)      Pressure 9/10.   4. CAUSE: \"What do you think is causing the symptoms?\"      Possible UTI   5. OTHER SYMPTOMS: \"Do you have any other symptoms?\" (e.g., blood in urine, fever, flank pain, pain with urination)      Possible blood in urine, flank pain, pt denies fever, pain with urination   6. PREGNANCY: \"Is there any chance you are pregnant?\" \"When was your last menstrual period?\"      Pt denies 12/3/24    Protocols used: Urinary Symptoms-Adult-[place]    "

## 2024-12-13 NOTE — PROGRESS NOTES
Assessment/Plan:     Will treat with Keflex x 5 days for possible UTI. Urine sent. Will also treat presumptively with Diflucan before and after Keflex. Will await results of sure swab and urine culture as well.   Pt instructed to call if pelvic pain persists after treatment at which time a TVU will be ordered.       Diagnoses and all orders for this visit:    UTI symptoms  -     cephalexin (KEFLEX) 500 mg capsule; Take 1 capsule (500 mg total) by mouth every 6 (six) hours for 5 days  -     UA w Reflex to Microscopic w Reflex to Culture    Yeast infection  -     fluconazole (DIFLUCAN) 150 mg tablet; Take 1 tablet (150 mg total) by mouth once for 1 dose Take one today and one at the completion of Keflex  -     SURESWAB(R) ADVANCED VAGINITIS PLUS, TMA    Pelvic pain        Subjective:      Patient ID: Chelo Boykin is a 44 y.o. female.    Started with pressure with urination with frequency and urgency about 1 month ago which has worsened.   Started with vaginal discharge 2 weeks ago. Started with itching yesterday. Has not tried any OTC products except naprosyn. Itching is pt's most bothersome sx today.   Sexually active with , but not since start of sx  LMP 12/3/24.        The following portions of the patient's history were reviewed and updated as appropriate: allergies, current medications, past family history, past medical history, past social history, past surgical history and problem list.    Review of Systems   Constitutional: Negative.    HENT: Negative.     Respiratory: Negative.     Cardiovascular: Negative.    Gastrointestinal: Negative.    Endocrine: Negative.    Genitourinary:  Positive for dysuria, frequency and urgency. Negative for difficulty urinating, menstrual problem, pelvic pain, vaginal bleeding, vaginal discharge and vaginal pain.   Musculoskeletal: Negative.    Skin: Negative.    Neurological: Negative.    Psychiatric/Behavioral: Negative.           Objective:      /64 (BP Location:  Left arm, Patient Position: Sitting, Cuff Size: Standard)   Wt 62.6 kg (138 lb)   LMP 12/03/2024 (Exact Date)   BMI 24.45 kg/m²          Physical Exam  Vitals and nursing note reviewed. Exam conducted with a chaperone present.   Constitutional:       Appearance: Normal appearance.   HENT:      Head: Normocephalic and atraumatic.   Pulmonary:      Effort: Pulmonary effort is normal.   Genitourinary:     Exam position: Supine.      Pubic Area: Rash (mild erythema) present.      Labia:         Right: No tenderness, lesion or injury.         Left: No tenderness, lesion or injury.       Urethra: No urethral pain, urethral swelling or urethral lesion.      Vagina: No signs of injury and foreign body. Vaginal discharge (thin white/yellow d/c mixed with brown blood), erythema, tenderness and bleeding (brown blood) present. No lesions.      Cervix: No friability, lesion or erythema.      Comments: Tenderness noted throughout pelvic region  Musculoskeletal:         General: Normal range of motion.      Cervical back: Normal range of motion.   Skin:     General: Skin is warm and dry.   Neurological:      Mental Status: She is alert and oriented to person, place, and time.   Psychiatric:         Mood and Affect: Mood normal.         Behavior: Behavior normal.         Thought Content: Thought content normal.         Judgment: Judgment normal.

## 2024-12-15 LAB
BACTERIA UR CULT: NORMAL
BV BACTERIA RRNA VAG QL NAA+PROBE: NEGATIVE
C GLABRATA RNA VAG QL NAA+PROBE: NOT DETECTED
C TRACH RRNA SPEC QL NAA+PROBE: NOT DETECTED
CANDIDA RRNA VAG QL PROBE: NOT DETECTED
N GONORRHOEA RRNA SPEC QL NAA+PROBE: NOT DETECTED
T VAGINALIS RRNA SPEC QL NAA+PROBE: NOT DETECTED

## 2024-12-16 ENCOUNTER — RESULTS FOLLOW-UP (OUTPATIENT)
Dept: GYNECOLOGY | Facility: CLINIC | Age: 44
End: 2024-12-16

## 2024-12-16 DIAGNOSIS — R10.2 PELVIC PAIN: Primary | ICD-10-CM

## 2024-12-16 NOTE — TELEPHONE ENCOUNTER
Spoke to pt to make her aware of results. Still awaiting urine culture results. Pt continues with sx of pelvic pain/pressure despite antibiotic.. TVU ordered. Number given for central scheduling.

## 2024-12-19 ENCOUNTER — HOSPITAL ENCOUNTER (OUTPATIENT)
Dept: ULTRASOUND IMAGING | Facility: HOSPITAL | Age: 44
End: 2024-12-19
Payer: COMMERCIAL

## 2024-12-19 DIAGNOSIS — R10.2 PELVIC PAIN: ICD-10-CM

## 2024-12-19 PROCEDURE — 76830 TRANSVAGINAL US NON-OB: CPT

## 2024-12-19 PROCEDURE — 76856 US EXAM PELVIC COMPLETE: CPT

## 2024-12-27 ENCOUNTER — TELEPHONE (OUTPATIENT)
Age: 44
End: 2024-12-27

## 2024-12-27 ENCOUNTER — RESULTS FOLLOW-UP (OUTPATIENT)
Dept: GYNECOLOGY | Facility: CLINIC | Age: 44
End: 2024-12-27

## 2024-12-27 DIAGNOSIS — R39.89 BLADDER PAIN: Primary | ICD-10-CM

## 2024-12-27 DIAGNOSIS — R10.2 PELVIC PAIN: ICD-10-CM

## 2024-12-27 RX ORDER — PHENAZOPYRIDINE HYDROCHLORIDE 100 MG/1
100 TABLET, FILM COATED ORAL 3 TIMES DAILY PRN
Qty: 10 TABLET | Refills: 0 | Status: SHIPPED | OUTPATIENT
Start: 2024-12-27

## 2024-12-27 NOTE — TELEPHONE ENCOUNTER
Pls call pt and have her use regular OTC AZO for 1 week instead and call us after a week to determine if sx improved.

## 2024-12-27 NOTE — TELEPHONE ENCOUNTER
PA for (PYRIDIUM) 100 mg SUBMITTED to [employer]    via    []CMM-KEY:   [x]Surescripts-Case ID #   []Availity-Auth ID # NDC #   []Faxed to plan   []Other website   []Phone call Case ID #     []PA sent as URGENT    All office notes, labs and other pertaining documents and studies sent. Clinical questions answered. Awaiting determination from insurance company.     Turnaround time for your insurance to make a decision on your Prior Authorization can take 7-21 business days.

## 2024-12-27 NOTE — TELEPHONE ENCOUNTER
Spoke to pt and made her aware of essentially normal TVU. Will repeat TVU in 6 weeks to reevaluate nonspecific uterine findings.   Pt continues with intermittent pelvic pressure and a feeling of having to void. Trace blood was seen in urine, but only 1-2 RBCs. Pt denies pain with full bladder and cannot pinpoint pain to any activity. She states bowel function changes depending on what she eats since cholecystectomy.   Since pt reports that pain/pressure occurs daily, will trial pyridum 3x a day for 3 days. Pt instructed to notify the office to determine status of pain after pyridium trial. If pain is not resolved, pt will need to follow with PCP or GI to r/o GI etiology.

## 2024-12-27 NOTE — TELEPHONE ENCOUNTER
PA for Phenazopyridine (PYRIDIUM) 100 mg tablet EXCLUDED from plan       Reason:(Screenshot if applicable)        Message sent to office clinical pool Yes

## 2025-01-26 ENCOUNTER — HOSPITAL ENCOUNTER (OUTPATIENT)
Dept: ULTRASOUND IMAGING | Facility: HOSPITAL | Age: 45
Discharge: HOME | End: 2025-01-26
Payer: COMMERCIAL

## 2025-01-26 DIAGNOSIS — R10.2 PELVIC PAIN: ICD-10-CM

## 2025-01-26 PROCEDURE — 76856 US EXAM PELVIC COMPLETE: CPT

## 2025-01-26 PROCEDURE — 76830 TRANSVAGINAL US NON-OB: CPT

## 2025-01-31 ENCOUNTER — RESULTS FOLLOW-UP (OUTPATIENT)
Dept: GYNECOLOGY | Facility: CLINIC | Age: 45
End: 2025-01-31

## 2025-01-31 NOTE — TELEPHONE ENCOUNTER
Spoke to pt and informed her of normal TVU. Pt states she has the pelvic pain a couple times per week, worse with caffeine intake. 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 Pt was not able to get pyridium. Instructed to take AZO for a week and determine if sx improve. She also states that she also has bowel urgency and discomfort with caffeine as well. She was advised to see GI which she has to determine bowel etiology as well. She has an annual scheduled with Dr. Keyes 2/11/25 and she will discuss with him at that visit as well.

## 2025-02-11 ENCOUNTER — ANNUAL EXAM (OUTPATIENT)
Dept: GYNECOLOGY | Facility: CLINIC | Age: 45
End: 2025-02-11
Payer: COMMERCIAL

## 2025-02-11 ENCOUNTER — OFFICE VISIT (OUTPATIENT)
Dept: FAMILY MEDICINE CLINIC | Facility: CLINIC | Age: 45
End: 2025-02-11
Payer: COMMERCIAL

## 2025-02-11 VITALS
SYSTOLIC BLOOD PRESSURE: 117 MMHG | BODY MASS INDEX: 24.45 KG/M2 | HEART RATE: 72 BPM | OXYGEN SATURATION: 99 % | WEIGHT: 138.01 LBS | HEIGHT: 63 IN | DIASTOLIC BLOOD PRESSURE: 55 MMHG

## 2025-02-11 VITALS
SYSTOLIC BLOOD PRESSURE: 100 MMHG | BODY MASS INDEX: 24.49 KG/M2 | HEART RATE: 73 BPM | HEIGHT: 63 IN | DIASTOLIC BLOOD PRESSURE: 60 MMHG | WEIGHT: 138.2 LBS

## 2025-02-11 DIAGNOSIS — Z13.1 SCREENING FOR DIABETES MELLITUS: ICD-10-CM

## 2025-02-11 DIAGNOSIS — B96.89 BV (BACTERIAL VAGINOSIS): ICD-10-CM

## 2025-02-11 DIAGNOSIS — Z01.419 ENCOUNTER FOR GYNECOLOGICAL EXAMINATION WITHOUT ABNORMAL FINDING: Primary | ICD-10-CM

## 2025-02-11 DIAGNOSIS — Z13.6 SCREENING FOR CARDIOVASCULAR CONDITION: ICD-10-CM

## 2025-02-11 DIAGNOSIS — F41.0 GENERALIZED ANXIETY DISORDER WITH PANIC ATTACKS: ICD-10-CM

## 2025-02-11 DIAGNOSIS — E78.00 ELEVATED LDL CHOLESTEROL LEVEL: ICD-10-CM

## 2025-02-11 DIAGNOSIS — F41.1 GENERALIZED ANXIETY DISORDER WITH PANIC ATTACKS: ICD-10-CM

## 2025-02-11 DIAGNOSIS — R10.2 PELVIC PAIN: ICD-10-CM

## 2025-02-11 DIAGNOSIS — N76.0 BV (BACTERIAL VAGINOSIS): ICD-10-CM

## 2025-02-11 DIAGNOSIS — R35.0 URINARY FREQUENCY: ICD-10-CM

## 2025-02-11 DIAGNOSIS — Z00.00 ANNUAL PHYSICAL EXAM: Primary | ICD-10-CM

## 2025-02-11 PROCEDURE — G0145 SCR C/V CYTO,THINLAYER,RESCR: HCPCS | Performed by: OBSTETRICS & GYNECOLOGY

## 2025-02-11 PROCEDURE — S0612 ANNUAL GYNECOLOGICAL EXAMINA: HCPCS | Performed by: OBSTETRICS & GYNECOLOGY

## 2025-02-11 PROCEDURE — 99396 PREV VISIT EST AGE 40-64: CPT | Performed by: FAMILY MEDICINE

## 2025-02-11 RX ORDER — CLINDAMYCIN PHOSPHATE 20 MG/G
1 CREAM VAGINAL
Qty: 40 G | Refills: 0 | Status: SHIPPED | OUTPATIENT
Start: 2025-02-11 | End: 2025-02-11

## 2025-02-11 NOTE — PROGRESS NOTES
Adult Annual Physical  Name: Chelo REDMAN Ems      : 1980      MRN: [id number]  Encounter Provider: Abril Rodas DO  Encounter Date: 2025   Encounter department: [facility]    Assessment & Plan  Annual physical exam    Orders:    CBC and differential; Future    Comprehensive metabolic panel; Future    Lipid panel; Future    Generalized anxiety disorder with panic attacks  Stable off medications  Discussed importance of establishing with regular therapy  Will check TSH given family history of Graves  Follow up as needed     Orders:    TSH, 3rd generation with Free T4 reflex; Future    Screening for cardiovascular condition    Orders:    CBC and differential; Future    Lipid panel; Future    Screening for diabetes mellitus    Orders:    Comprehensive metabolic panel; Future    Elevated LDL cholesterol level    Orders:    Lipid panel; Future    Immunizations and preventive care screenings were discussed with patient today. Appropriate education was printed on patient's after visit summary.    Return in about 1 year (around 2026) for Annual physical.        Depression Screening and Follow-up Plan: Patient's depression screening was positive with a PHQ-2 score of 3. Their PHQ-9 score was 8.   Patient with underlying depression and was advised to continue current medications as prescribed. Currently recommend to restart with regular therapy. Will continue to monitor         History of Present Illness       Chief Complaint   Patient presents with    Annual Exam     Care gap message sent     Patient had GB taken out in October and has done well from that.     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 She has been there a year this April.     Adult Annual Physical:  Patient presents for annual physical.     Diet and Physical Activity:  - Diet/Nutrition: well balanced diet, low fat diet, portion control and consuming 3-5  servings of fruits/vegetables daily.  - Exercise: no formal exercise.    Depression Screening:  - PHQ-2 Score: 3  - PHQ-9 Score: 8    General Health:  - Sleep: unrefreshing sleep and 4-6 hours of sleep on average. Trouble staying asleep, trouble falling asleep recently as well. Has anot of stress related to her father living in nursing home.  - Hearing: normal hearing bilateral ears.  - Vision: goes for regular eye exams and wears glasses and contacts.  - Dental: regular dental visits.    /GYN Health:  - Follows with GYN: yes.   - History of STDs: no  - Contraception:. tubal ligation      Review of Systems   Constitutional:  Negative for activity change, appetite change, chills, diaphoresis, fever and unexpected weight change.   HENT:  Negative for congestion, rhinorrhea, sore throat and trouble swallowing.    Eyes:  Negative for visual disturbance.   Respiratory:  Negative for cough, shortness of breath and wheezing.    Cardiovascular:  Negative for chest pain, palpitations and leg swelling.   Gastrointestinal:  Negative for abdominal pain, blood in stool, constipation and diarrhea.   Genitourinary:  Negative for difficulty urinating, dysuria, frequency and hematuria.   Musculoskeletal:  Negative for arthralgias, back pain and joint swelling.   Skin:  Negative for color change and rash.   Neurological:  Negative for dizziness, light-headedness and headaches.   Psychiatric/Behavioral:  Positive for dysphoric mood. The patient is nervous/anxious.      Current Outpatient Medications on File Prior to Visit   Medication Sig Dispense Refill    [DISCONTINUED] clindamycin (CLEOCIN) 2 % vaginal cream Insert 1 applicator into the vagina daily at bedtime (Patient not taking: Reported on 2/11/2025) 40 g 0    [DISCONTINUED] dicyclomine (BENTYL) 20 mg tablet Take 1 tablet (20 mg total) by mouth 4 (four) times a day as needed (abdominal pain) (Patient not taking: Reported on 2/11/2025) 40 tablet 2    [DISCONTINUED] ibuprofen  "(MOTRIN) 200 mg tablet Take by mouth every 6 (six) hours as needed for mild pain (Patient not taking: Reported on 2/11/2025)      [DISCONTINUED] oxyCODONE (ROXICODONE) 5 immediate release tablet Take 1 tablet (5 mg total) by mouth every 4 (four) hours as needed for moderate pain for up to 10 doses Max Daily Amount: 30 mg (Patient not taking: Reported on 12/13/2024) 10 tablet 0    [DISCONTINUED] phenazopyridine (PYRIDIUM) 100 mg tablet Take 1 tablet (100 mg total) by mouth 3 (three) times a day as needed for bladder spasms (Patient not taking: Reported on 2/11/2025) 10 tablet 0     No current facility-administered medications on file prior to visit.      Social History     Tobacco Use    Smoking status: Never    Smokeless tobacco: Never   Vaping Use    Vaping status: Never Used   Substance and Sexual Activity    Alcohol use: Yes     Alcohol/week: 1.0 standard drink of alcohol     Types: 1 Standard drinks or equivalent per week     Comment: occasion    Drug use: No    Sexual activity: Yes     Partners: Male     Birth control/protection: Surgical     Comment: she is        Objective   /55 (BP Location: Right arm, Patient Position: Sitting, Cuff Size: Standard)   Pulse 72   Ht 5' 3\" (1.6 m)   Wt 62.6 kg (138 lb 0.1 oz)   LMP 02/10/2025   SpO2 99%   BMI 24.45 kg/m²     Physical Exam  Vitals reviewed.   Constitutional:       General: She is not in acute distress.     Appearance: Normal appearance. She is normal weight. She is not ill-appearing.   HENT:      Head: Normocephalic and atraumatic.      Right Ear: Tympanic membrane, ear canal and external ear normal.      Left Ear: Tympanic membrane, ear canal and external ear normal.      Nose: Nose normal.      Mouth/Throat:      Mouth: Mucous membranes are moist.      Pharynx: Oropharynx is clear.   Eyes:      Extraocular Movements: Extraocular movements intact.      Conjunctiva/sclera: Conjunctivae normal.   Cardiovascular:      Rate and Rhythm: Normal " rate and regular rhythm.      Heart sounds: Normal heart sounds.   Pulmonary:      Effort: Pulmonary effort is normal.      Breath sounds: Normal breath sounds.   Abdominal:      General: Abdomen is flat. Bowel sounds are normal. There is no distension.      Palpations: Abdomen is soft. There is no mass.      Tenderness: There is abdominal tenderness (to the left of the umbilicus, superficial - thought to be secondary to her recent GB surgery, will continue to monitor).   Musculoskeletal:      Cervical back: Neck supple.      Right lower leg: No edema.      Left lower leg: No edema.   Skin:     General: Skin is warm.   Neurological:      General: No focal deficit present.      Mental Status: She is alert.

## 2025-02-11 NOTE — ASSESSMENT & PLAN NOTE
Stable off medications  Discussed importance of establishing with regular therapy  Will check TSH given family history of Graves  Follow up as needed     Orders:    TSH, 3rd generation with Free T4 reflex; Future

## 2025-02-11 NOTE — PROGRESS NOTES
"Name: Chelo Boykin      : 1980      MRN: [id number]  Encounter Provider: Finesse Keyes DO  Encounter Date: 2025   Encounter department: [facility]  :  Assessment & Plan  Encounter for gynecological examination without abnormal finding    Orders:    Liquid-based pap, screening    BV (bacterial vaginosis)    Orders:    clindamycin (CLEOCIN) 2 % vaginal cream; Insert 1 applicator into the vagina daily at bedtime    Urinary frequency  Return to the office for PST to rule out interstitial cystitis       Pelvic pain             History of Present Illness   HPI  Chelo Boykin is a 44 y.o. female who presents for annual examination.  Presently menstruating.  She is experiencing some vaginal discharge with an odor.  Urinary frequency.  Denies any dysuria, hematuria.  Denies any fever.  She does have some suprapubic pain and mild dyspareunia.  No GI complaints.  Recent ultrasound was within normal limits other than a subcentimeter fibroid      Review of Systems   Constitutional: Negative.    HENT:  Negative for sore throat and trouble swallowing.    Gastrointestinal: Negative.    Genitourinary:  Positive for frequency and pelvic pain.          Objective   /60 (BP Location: Left arm, Patient Position: Sitting, Cuff Size: Standard)   Pulse 73   Ht 5' 3\" (1.6 m)   Wt 62.7 kg (138 lb 3.2 oz)   LMP 02/10/2025   BMI 24.48 kg/m²      Physical Exam  Vitals reviewed.   Cardiovascular:      Rate and Rhythm: Normal rate and regular rhythm.      Pulses: Normal pulses.      Heart sounds: Normal heart sounds.   Pulmonary:      Effort: Pulmonary effort is normal.      Breath sounds: Normal breath sounds.   Chest:   Breasts:     Right: No swelling, bleeding, inverted nipple, mass, nipple discharge, skin change or tenderness.      Left: No swelling, bleeding, inverted nipple, mass, nipple discharge, skin change or tenderness.   Abdominal:      General: There is no distension. "      Palpations: Abdomen is soft. There is no mass.      Tenderness: There is no abdominal tenderness. There is no guarding or rebound.      Hernia: No hernia is present. There is no hernia in the left inguinal area or right inguinal area.   Genitourinary:     General: Normal vulva.      Labia:         Right: No rash, tenderness or lesion.         Left: No rash, tenderness or lesion.       Vagina: Normal.      Cervix: Normal.      Uterus: Normal.       Adnexa:         Right: No mass, tenderness or fullness.          Left: No mass, tenderness or fullness.     Musculoskeletal:      Cervical back: Normal range of motion and neck supple. No tenderness.   Lymphadenopathy:      Cervical: No cervical adenopathy.      Upper Body:      Right upper body: No supraclavicular, axillary or pectoral adenopathy.      Left upper body: No supraclavicular, axillary or pectoral adenopathy.      Lower Body: No right inguinal adenopathy. No left inguinal adenopathy.   Neurological:      Mental Status: She is alert.

## 2025-02-12 ENCOUNTER — TELEPHONE (OUTPATIENT)
Age: 45
End: 2025-02-12

## 2025-02-12 ENCOUNTER — TELEPHONE (OUTPATIENT)
Dept: ADMINISTRATIVE | Facility: OTHER | Age: 45
End: 2025-02-12

## 2025-02-12 DIAGNOSIS — R35.0 URINARY FREQUENCY: Primary | ICD-10-CM

## 2025-02-12 NOTE — TELEPHONE ENCOUNTER
Upon review of the In Basket request we were able to locate, review, and update the patient chart as requested for HIV.    Any additional questions or concerns should be emailed to the Practice Liaisons via the appropriate education email address, please do not reply via In Basket.    Thank you  Crystal Bustillos   [facility]

## 2025-02-12 NOTE — TELEPHONE ENCOUNTER
:     New Patient     Appointment Scheduling:  What office location does the patient prefer?:[place]  What is the reason for the patient's appointment?:  R35.0 (ICD-10-CM) - Urinary frequency       Have patient records been requested?:  If No, are the records showing in Epic: epic  Appointment Details: Date  4/4/25  Time: 3   Location:  [place] Provider:  mariano  Does the appointment need further review? (Reason)        HISTORY:   Is the patient having active symptoms? If so, describe symptoms:frequency UTI symptoms   Has the patient had any previous Urologist(s)?:no  Was the patient seen in the ED?:no  Has the patient had any outside testing done?:  Does patient have Imaging/Lab Results:  Does the patient have a personal history of any cancer?:no     INSURANCE:   Have you confirmed Patient's insurance? yes  Is the insurance accepted?  yes  Is the insurance active?yes

## 2025-02-12 NOTE — TELEPHONE ENCOUNTER
----- Message from Anne SHIN sent at 2/11/2025  1:33 PM EST -----  Regarding: Quality Update/hiv  02/11/25 1:33 PM    Hello, our patient No patient name on file. has had HIV completed/performed. Please assist in updating the patient chart by pulling the Care Everywhere (CE) document. The date of service is 3/4/10. HIV screening done 3/4/2010 Jered     Thank you,  Anne Bhakta MA  [facility]

## 2025-02-13 ENCOUNTER — APPOINTMENT (OUTPATIENT)
Dept: LAB | Facility: CLINIC | Age: 45
End: 2025-02-13
Payer: COMMERCIAL

## 2025-02-13 DIAGNOSIS — F41.0 GENERALIZED ANXIETY DISORDER WITH PANIC ATTACKS: ICD-10-CM

## 2025-02-13 DIAGNOSIS — F41.1 GENERALIZED ANXIETY DISORDER WITH PANIC ATTACKS: ICD-10-CM

## 2025-02-13 DIAGNOSIS — Z00.00 ANNUAL PHYSICAL EXAM: ICD-10-CM

## 2025-02-13 DIAGNOSIS — E78.00 ELEVATED LDL CHOLESTEROL LEVEL: ICD-10-CM

## 2025-02-13 DIAGNOSIS — Z13.6 SCREENING FOR CARDIOVASCULAR CONDITION: ICD-10-CM

## 2025-02-13 DIAGNOSIS — Z13.1 SCREENING FOR DIABETES MELLITUS: ICD-10-CM

## 2025-02-13 LAB
ALBUMIN SERPL BCG-MCNC: 4.3 G/DL (ref 3.5–5)
ALP SERPL-CCNC: 59 U/L (ref 34–104)
ALT SERPL W P-5'-P-CCNC: 15 U/L (ref 7–52)
ANION GAP SERPL CALCULATED.3IONS-SCNC: 9 MMOL/L (ref 4–13)
AST SERPL W P-5'-P-CCNC: 15 U/L (ref 13–39)
BASOPHILS # BLD AUTO: 0.06 THOUSANDS/ÂΜL (ref 0–0.1)
BASOPHILS NFR BLD AUTO: 1 % (ref 0–1)
BILIRUB SERPL-MCNC: 0.32 MG/DL (ref 0.2–1)
BUN SERPL-MCNC: 15 MG/DL (ref 5–25)
CALCIUM SERPL-MCNC: 9.2 MG/DL (ref 8.4–10.2)
CHLORIDE SERPL-SCNC: 107 MMOL/L (ref 96–108)
CHOLEST SERPL-MCNC: 162 MG/DL (ref ?–200)
CO2 SERPL-SCNC: 25 MMOL/L (ref 21–32)
CREAT SERPL-MCNC: 0.69 MG/DL (ref 0.6–1.3)
EOSINOPHIL # BLD AUTO: 0.22 THOUSAND/ÂΜL (ref 0–0.61)
EOSINOPHIL NFR BLD AUTO: 4 % (ref 0–6)
ERYTHROCYTE [DISTWIDTH] IN BLOOD BY AUTOMATED COUNT: 12.6 % (ref 11.6–15.1)
GFR SERPL CREATININE-BSD FRML MDRD: 107 ML/MIN/1.73SQ M
GLUCOSE P FAST SERPL-MCNC: 100 MG/DL (ref 65–99)
HCT VFR BLD AUTO: 42.1 % (ref 34.8–46.1)
HDLC SERPL-MCNC: 41 MG/DL
HGB BLD-MCNC: 14.1 G/DL (ref 11.5–15.4)
IMM GRANULOCYTES # BLD AUTO: 0.01 THOUSAND/UL (ref 0–0.2)
IMM GRANULOCYTES NFR BLD AUTO: 0 % (ref 0–2)
LDLC SERPL CALC-MCNC: 102 MG/DL (ref 0–100)
LYMPHOCYTES # BLD AUTO: 1.45 THOUSANDS/ÂΜL (ref 0.6–4.47)
LYMPHOCYTES NFR BLD AUTO: 25 % (ref 14–44)
MCH RBC QN AUTO: 30.6 PG (ref 26.8–34.3)
MCHC RBC AUTO-ENTMCNC: 33.5 G/DL (ref 31.4–37.4)
MCV RBC AUTO: 91 FL (ref 82–98)
MONOCYTES # BLD AUTO: 0.48 THOUSAND/ÂΜL (ref 0.17–1.22)
MONOCYTES NFR BLD AUTO: 8 % (ref 4–12)
NEUTROPHILS # BLD AUTO: 3.61 THOUSANDS/ÂΜL (ref 1.85–7.62)
NEUTS SEG NFR BLD AUTO: 62 % (ref 43–75)
NONHDLC SERPL-MCNC: 121 MG/DL
NRBC BLD AUTO-RTO: 0 /100 WBCS
PLATELET # BLD AUTO: 333 THOUSANDS/UL (ref 149–390)
PMV BLD AUTO: 9.7 FL (ref 8.9–12.7)
POTASSIUM SERPL-SCNC: 3.9 MMOL/L (ref 3.5–5.3)
PROT SERPL-MCNC: 7 G/DL (ref 6.4–8.4)
RBC # BLD AUTO: 4.61 MILLION/UL (ref 3.81–5.12)
SODIUM SERPL-SCNC: 141 MMOL/L (ref 135–147)
TRIGL SERPL-MCNC: 96 MG/DL (ref ?–150)
TSH SERPL DL<=0.05 MIU/L-ACNC: 1.66 UIU/ML (ref 0.45–4.5)
WBC # BLD AUTO: 5.83 THOUSAND/UL (ref 4.31–10.16)

## 2025-02-13 PROCEDURE — 80061 LIPID PANEL: CPT

## 2025-02-13 PROCEDURE — 84443 ASSAY THYROID STIM HORMONE: CPT

## 2025-02-13 PROCEDURE — 36415 COLL VENOUS BLD VENIPUNCTURE: CPT

## 2025-02-13 PROCEDURE — 80053 COMPREHEN METABOLIC PANEL: CPT

## 2025-02-13 PROCEDURE — 85025 COMPLETE CBC W/AUTO DIFF WBC: CPT

## 2025-02-14 LAB
LAB AP GYN PRIMARY INTERPRETATION: NORMAL
Lab: NORMAL

## 2025-03-01 ENCOUNTER — RESULTS FOLLOW-UP (OUTPATIENT)
Dept: FAMILY MEDICINE CLINIC | Facility: CLINIC | Age: 45
End: 2025-03-01

## 2025-03-13 ENCOUNTER — HOSPITAL ENCOUNTER (EMERGENCY)
Facility: HOSPITAL | Age: 45
Discharge: HOME | End: 2025-03-13
Attending: EMERGENCY MEDICINE
Payer: COMMERCIAL

## 2025-03-13 ENCOUNTER — APPOINTMENT (EMERGENCY)
Dept: ULTRASOUND IMAGING | Facility: HOSPITAL | Age: 45
End: 2025-03-13
Payer: COMMERCIAL

## 2025-03-13 ENCOUNTER — APPOINTMENT (EMERGENCY)
Dept: CT IMAGING | Facility: HOSPITAL | Age: 45
End: 2025-03-13
Payer: COMMERCIAL

## 2025-03-13 VITALS
DIASTOLIC BLOOD PRESSURE: 55 MMHG | WEIGHT: 137.79 LBS | TEMPERATURE: 97.3 F | BODY MASS INDEX: 24.41 KG/M2 | HEIGHT: 63 IN | RESPIRATION RATE: 16 BRPM | SYSTOLIC BLOOD PRESSURE: 107 MMHG | HEART RATE: 68 BPM | OXYGEN SATURATION: 98 %

## 2025-03-13 DIAGNOSIS — R10.9 ABDOMINAL PAIN: Primary | ICD-10-CM

## 2025-03-13 LAB
ALBUMIN SERPL BCG-MCNC: 4.4 G/DL (ref 3.5–5)
ALP SERPL-CCNC: 51 U/L (ref 34–104)
ALT SERPL W P-5'-P-CCNC: 14 U/L (ref 7–52)
ANION GAP SERPL CALCULATED.3IONS-SCNC: 5 MMOL/L (ref 4–13)
AST SERPL W P-5'-P-CCNC: 14 U/L (ref 13–39)
BACTERIA UR QL AUTO: ABNORMAL /HPF
BASOPHILS # BLD AUTO: 0.06 THOUSANDS/ÂΜL (ref 0–0.1)
BASOPHILS NFR BLD AUTO: 1 % (ref 0–1)
BILIRUB SERPL-MCNC: 0.73 MG/DL (ref 0.2–1)
BILIRUB UR QL STRIP: NEGATIVE
BUN SERPL-MCNC: 12 MG/DL (ref 5–25)
CALCIUM SERPL-MCNC: 8.6 MG/DL (ref 8.4–10.2)
CHLORIDE SERPL-SCNC: 106 MMOL/L (ref 96–108)
CLARITY UR: ABNORMAL
CO2 SERPL-SCNC: 28 MMOL/L (ref 21–32)
COLOR UR: YELLOW
CREAT SERPL-MCNC: 0.67 MG/DL (ref 0.6–1.3)
EOSINOPHIL # BLD AUTO: 0.17 THOUSAND/ÂΜL (ref 0–0.61)
EOSINOPHIL NFR BLD AUTO: 3 % (ref 0–6)
ERYTHROCYTE [DISTWIDTH] IN BLOOD BY AUTOMATED COUNT: 12.7 % (ref 11.6–15.1)
EXT PREGNANCY TEST URINE: NEGATIVE
EXT. CONTROL: NORMAL
GFR SERPL CREATININE-BSD FRML MDRD: 108 ML/MIN/1.73SQ M
GLUCOSE SERPL-MCNC: 101 MG/DL (ref 65–140)
GLUCOSE UR STRIP-MCNC: NEGATIVE MG/DL
HCT VFR BLD AUTO: 44.5 % (ref 34.8–46.1)
HGB BLD-MCNC: 14.4 G/DL (ref 11.5–15.4)
HGB UR QL STRIP.AUTO: ABNORMAL
IMM GRANULOCYTES # BLD AUTO: 0.03 THOUSAND/UL (ref 0–0.2)
IMM GRANULOCYTES NFR BLD AUTO: 1 % (ref 0–2)
KETONES UR STRIP-MCNC: NEGATIVE MG/DL
LACTATE SERPL-SCNC: 1.6 MMOL/L (ref 0.5–2)
LEUKOCYTE ESTERASE UR QL STRIP: NEGATIVE
LIPASE SERPL-CCNC: 46 U/L (ref 11–82)
LYMPHOCYTES # BLD AUTO: 1.71 THOUSANDS/ÂΜL (ref 0.6–4.47)
LYMPHOCYTES NFR BLD AUTO: 26 % (ref 14–44)
MCH RBC QN AUTO: 29.4 PG (ref 26.8–34.3)
MCHC RBC AUTO-ENTMCNC: 32.4 G/DL (ref 31.4–37.4)
MCV RBC AUTO: 91 FL (ref 82–98)
MONOCYTES # BLD AUTO: 0.53 THOUSAND/ÂΜL (ref 0.17–1.22)
MONOCYTES NFR BLD AUTO: 8 % (ref 4–12)
MUCOUS THREADS UR QL AUTO: ABNORMAL
NEUTROPHILS # BLD AUTO: 4 THOUSANDS/ÂΜL (ref 1.85–7.62)
NEUTS SEG NFR BLD AUTO: 61 % (ref 43–75)
NITRITE UR QL STRIP: NEGATIVE
NON-SQ EPI CELLS URNS QL MICRO: ABNORMAL /HPF
NRBC BLD AUTO-RTO: 0 /100 WBCS
PH UR STRIP.AUTO: 6 [PH]
PLATELET # BLD AUTO: 328 THOUSANDS/UL (ref 149–390)
PMV BLD AUTO: 8.9 FL (ref 8.9–12.7)
POTASSIUM SERPL-SCNC: 3.6 MMOL/L (ref 3.5–5.3)
PROT SERPL-MCNC: 7.3 G/DL (ref 6.4–8.4)
PROT UR STRIP-MCNC: NEGATIVE MG/DL
RBC # BLD AUTO: 4.89 MILLION/UL (ref 3.81–5.12)
RBC #/AREA URNS AUTO: ABNORMAL /HPF
SODIUM SERPL-SCNC: 139 MMOL/L (ref 135–147)
SP GR UR STRIP.AUTO: >=1.03 (ref 1–1.03)
UROBILINOGEN UR QL STRIP.AUTO: 0.2 E.U./DL
WBC # BLD AUTO: 6.5 THOUSAND/UL (ref 4.31–10.16)
WBC #/AREA URNS AUTO: ABNORMAL /HPF

## 2025-03-13 PROCEDURE — 76830 TRANSVAGINAL US NON-OB: CPT

## 2025-03-13 PROCEDURE — 36415 COLL VENOUS BLD VENIPUNCTURE: CPT | Performed by: EMERGENCY MEDICINE

## 2025-03-13 PROCEDURE — 80053 COMPREHEN METABOLIC PANEL: CPT | Performed by: EMERGENCY MEDICINE

## 2025-03-13 PROCEDURE — 96374 THER/PROPH/DIAG INJ IV PUSH: CPT

## 2025-03-13 PROCEDURE — 81025 URINE PREGNANCY TEST: CPT | Performed by: EMERGENCY MEDICINE

## 2025-03-13 PROCEDURE — 81001 URINALYSIS AUTO W/SCOPE: CPT | Performed by: EMERGENCY MEDICINE

## 2025-03-13 PROCEDURE — 96375 TX/PRO/DX INJ NEW DRUG ADDON: CPT

## 2025-03-13 PROCEDURE — 85025 COMPLETE CBC W/AUTO DIFF WBC: CPT | Performed by: EMERGENCY MEDICINE

## 2025-03-13 PROCEDURE — 99284 EMERGENCY DEPT VISIT MOD MDM: CPT

## 2025-03-13 PROCEDURE — 76856 US EXAM PELVIC COMPLETE: CPT

## 2025-03-13 PROCEDURE — 74177 CT ABD & PELVIS W/CONTRAST: CPT

## 2025-03-13 PROCEDURE — 83690 ASSAY OF LIPASE: CPT | Performed by: EMERGENCY MEDICINE

## 2025-03-13 PROCEDURE — 83605 ASSAY OF LACTIC ACID: CPT | Performed by: EMERGENCY MEDICINE

## 2025-03-13 PROCEDURE — 99285 EMERGENCY DEPT VISIT HI MDM: CPT | Performed by: EMERGENCY MEDICINE

## 2025-03-13 RX ORDER — KETOROLAC TROMETHAMINE 30 MG/ML
15 INJECTION, SOLUTION INTRAMUSCULAR; INTRAVENOUS ONCE
Status: COMPLETED | OUTPATIENT
Start: 2025-03-13 | End: 2025-03-13

## 2025-03-13 RX ORDER — NAPROXEN 500 MG/1
500 TABLET ORAL 2 TIMES DAILY PRN
Qty: 30 TABLET | Refills: 0 | Status: SHIPPED | OUTPATIENT
Start: 2025-03-13

## 2025-03-13 RX ORDER — ONDANSETRON 4 MG/1
4 TABLET, ORALLY DISINTEGRATING ORAL EVERY 8 HOURS PRN
Qty: 20 TABLET | Refills: 0 | Status: SHIPPED | OUTPATIENT
Start: 2025-03-13

## 2025-03-13 RX ORDER — DICYCLOMINE HCL 20 MG
20 TABLET ORAL EVERY 8 HOURS PRN
Qty: 20 TABLET | Refills: 0 | Status: SHIPPED | OUTPATIENT
Start: 2025-03-13

## 2025-03-13 RX ORDER — PANTOPRAZOLE SODIUM 40 MG/10ML
40 INJECTION, POWDER, LYOPHILIZED, FOR SOLUTION INTRAVENOUS ONCE
Status: COMPLETED | OUTPATIENT
Start: 2025-03-13 | End: 2025-03-13

## 2025-03-13 RX ORDER — ONDANSETRON 2 MG/ML
4 INJECTION INTRAMUSCULAR; INTRAVENOUS ONCE
Status: COMPLETED | OUTPATIENT
Start: 2025-03-13 | End: 2025-03-13

## 2025-03-13 RX ADMIN — ONDANSETRON 4 MG: 2 INJECTION, SOLUTION INTRAMUSCULAR; INTRAVENOUS at 06:25

## 2025-03-13 RX ADMIN — IOHEXOL 85 ML: 350 INJECTION, SOLUTION INTRAVENOUS at 06:44

## 2025-03-13 RX ADMIN — KETOROLAC TROMETHAMINE 15 MG: 30 INJECTION, SOLUTION INTRAMUSCULAR; INTRAVENOUS at 06:24

## 2025-03-13 RX ADMIN — PANTOPRAZOLE SODIUM 40 MG: 40 INJECTION, POWDER, FOR SOLUTION INTRAVENOUS at 06:24

## 2025-03-13 NOTE — Clinical Note
Chelo Ems was seen and treated in our emergency department on 3/13/2025.                Diagnosis:     Chelo  may return to work on return date.    She may return on this date: 03/15/2025         If you have any questions or concerns, please don't hesitate to call.      Aruna Rockwell RN    ______________________________           _______________          _______________  Hospital Representative                              Date                                Time

## 2025-03-13 NOTE — DISCHARGE INSTRUCTIONS
We recommend follow-up with your gynecologist.  We also recommend follow-up with gastroenterology.  Return to the emergency room with any worsening.      Thank you for choosing the emergency department at [facility]. We appreciated the opportunity and privilege to address your healthcare needs. We remain available to you should you require additional evaluation or assistance. We value your feedback and would appreciate the opportunity to address anything you identified as an opportunity to improve or where we excelled. If there are colleagues who deserve special recognition, please let us know! We hope you are feeling better soon!    Please also note that sometimes there are subtle abnormalities in your lab values that you may observe when you access your record online.  These are frequently not worrisome and if they are of concern we will have discussed them with you.  However, we always encourage that you discuss any concerns you may have or observe on your record with your primary care provider.   Please also note that while your visit documentation was reviewed prior to completion, voice transcription will occasionally recognize words or grammar differently than what was spoken.
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
## 2025-03-28 PROBLEM — Z87.442 HISTORY OF KIDNEY STONES: Status: ACTIVE | Noted: 2025-03-28

## 2025-03-28 PROBLEM — R35.0 URINARY FREQUENCY: Status: ACTIVE | Noted: 2025-03-28

## 2025-04-04 ENCOUNTER — APPOINTMENT (EMERGENCY)
Dept: CT IMAGING | Facility: HOSPITAL | Age: 45
End: 2025-04-04
Payer: COMMERCIAL

## 2025-04-04 ENCOUNTER — HOSPITAL ENCOUNTER (EMERGENCY)
Facility: HOSPITAL | Age: 45
Discharge: HOME | End: 2025-04-04
Attending: EMERGENCY MEDICINE
Payer: COMMERCIAL

## 2025-04-04 ENCOUNTER — APPOINTMENT (EMERGENCY)
Dept: RADIOLOGY | Facility: HOSPITAL | Age: 45
End: 2025-04-04
Payer: COMMERCIAL

## 2025-04-04 VITALS
SYSTOLIC BLOOD PRESSURE: 116 MMHG | WEIGHT: 138.89 LBS | OXYGEN SATURATION: 100 % | TEMPERATURE: 97.8 F | RESPIRATION RATE: 23 BRPM | HEART RATE: 82 BPM | BODY MASS INDEX: 24.6 KG/M2 | DIASTOLIC BLOOD PRESSURE: 57 MMHG

## 2025-04-04 DIAGNOSIS — I67.1 ANEURYSM OF CAVERNOUS PORTION OF LEFT INTERNAL CAROTID ARTERY: ICD-10-CM

## 2025-04-04 DIAGNOSIS — R42 VERTIGO: ICD-10-CM

## 2025-04-04 DIAGNOSIS — R07.89 ATYPICAL CHEST PAIN: Primary | ICD-10-CM

## 2025-04-04 DIAGNOSIS — E87.6 HYPOKALEMIA: ICD-10-CM

## 2025-04-04 LAB
2HR DELTA HS TROPONIN: 1 NG/L
ABO GROUP BLD: NORMAL
ALBUMIN SERPL BCG-MCNC: 4.4 G/DL (ref 3.5–5)
ALP SERPL-CCNC: 57 U/L (ref 34–104)
ALT SERPL W P-5'-P-CCNC: 29 U/L (ref 7–52)
ANION GAP SERPL CALCULATED.3IONS-SCNC: 6 MMOL/L (ref 4–13)
APTT PPP: 25 SECONDS (ref 23–34)
AST SERPL W P-5'-P-CCNC: 22 U/L (ref 13–39)
ATRIAL RATE: 94 BPM
BASOPHILS # BLD AUTO: 0.07 THOUSANDS/ÂΜL (ref 0–0.1)
BASOPHILS NFR BLD AUTO: 1 % (ref 0–1)
BILIRUB SERPL-MCNC: 0.45 MG/DL (ref 0.2–1)
BILIRUB UR QL STRIP: NEGATIVE
BLD GP AB SCN SERPL QL: NEGATIVE
BUN SERPL-MCNC: 14 MG/DL (ref 5–25)
CALCIUM SERPL-MCNC: 8.6 MG/DL (ref 8.4–10.2)
CARDIAC TROPONIN I PNL SERPL HS: 3 NG/L (ref ?–50)
CARDIAC TROPONIN I PNL SERPL HS: 4 NG/L (ref ?–50)
CHLORIDE SERPL-SCNC: 104 MMOL/L (ref 96–108)
CLARITY UR: CLEAR
CO2 SERPL-SCNC: 26 MMOL/L (ref 21–32)
COLOR UR: YELLOW
CREAT SERPL-MCNC: 0.69 MG/DL (ref 0.6–1.3)
D DIMER PPP FEU-MCNC: <0.27 UG/ML FEU
EOSINOPHIL # BLD AUTO: 0.17 THOUSAND/ÂΜL (ref 0–0.61)
EOSINOPHIL NFR BLD AUTO: 2 % (ref 0–6)
ERYTHROCYTE [DISTWIDTH] IN BLOOD BY AUTOMATED COUNT: 12.5 % (ref 11.6–15.1)
GFR SERPL CREATININE-BSD FRML MDRD: 107 ML/MIN/1.73SQ M
GLUCOSE SERPL-MCNC: 156 MG/DL (ref 65–140)
GLUCOSE UR STRIP-MCNC: NEGATIVE MG/DL
HCT VFR BLD AUTO: 43.1 % (ref 34.8–46.1)
HGB BLD-MCNC: 14.3 G/DL (ref 11.5–15.4)
HGB UR QL STRIP.AUTO: NEGATIVE
IMM GRANULOCYTES # BLD AUTO: 0.03 THOUSAND/UL (ref 0–0.2)
IMM GRANULOCYTES NFR BLD AUTO: 0 % (ref 0–2)
INR PPP: 0.97 (ref 0.85–1.19)
KETONES UR STRIP-MCNC: NEGATIVE MG/DL
LEUKOCYTE ESTERASE UR QL STRIP: NEGATIVE
LYMPHOCYTES # BLD AUTO: 1.96 THOUSANDS/ÂΜL (ref 0.6–4.47)
LYMPHOCYTES NFR BLD AUTO: 26 % (ref 14–44)
MAGNESIUM SERPL-MCNC: 1.7 MG/DL (ref 1.9–2.7)
MCH RBC QN AUTO: 29.9 PG (ref 26.8–34.3)
MCHC RBC AUTO-ENTMCNC: 33.2 G/DL (ref 31.4–37.4)
MCV RBC AUTO: 90 FL (ref 82–98)
MONOCYTES # BLD AUTO: 0.56 THOUSAND/ÂΜL (ref 0.17–1.22)
MONOCYTES NFR BLD AUTO: 7 % (ref 4–12)
NEUTROPHILS # BLD AUTO: 4.87 THOUSANDS/ÂΜL (ref 1.85–7.62)
NEUTS SEG NFR BLD AUTO: 64 % (ref 43–75)
NITRITE UR QL STRIP: NEGATIVE
NRBC BLD AUTO-RTO: 0 /100 WBCS
P AXIS: 61 DEGREES
PH UR STRIP.AUTO: 6.5 [PH]
PLATELET # BLD AUTO: 266 THOUSANDS/UL (ref 149–390)
PMV BLD AUTO: 9.4 FL (ref 8.9–12.7)
POTASSIUM SERPL-SCNC: 3.3 MMOL/L (ref 3.5–5.3)
PR INTERVAL: 136 MS
PROT SERPL-MCNC: 7.3 G/DL (ref 6.4–8.4)
PROT UR STRIP-MCNC: NEGATIVE MG/DL
PROTHROMBIN TIME: 13.3 SECONDS (ref 12.3–15)
QRS AXIS: 69 DEGREES
QRSD INTERVAL: 78 MS
QT INTERVAL: 344 MS
QTC INTERVAL: 430 MS
RBC # BLD AUTO: 4.79 MILLION/UL (ref 3.81–5.12)
RH BLD: POSITIVE
SODIUM SERPL-SCNC: 136 MMOL/L (ref 135–147)
SP GR UR STRIP.AUTO: 1.01 (ref 1–1.03)
SPECIMEN EXPIRATION DATE: NORMAL
T WAVE AXIS: 30 DEGREES
UROBILINOGEN UR QL STRIP.AUTO: 0.2 E.U./DL
VENTRICULAR RATE: 94 BPM
WBC # BLD AUTO: 7.66 THOUSAND/UL (ref 4.31–10.16)

## 2025-04-04 PROCEDURE — 85379 FIBRIN DEGRADATION QUANT: CPT | Performed by: PHYSICIAN ASSISTANT

## 2025-04-04 PROCEDURE — 96361 HYDRATE IV INFUSION ADD-ON: CPT

## 2025-04-04 PROCEDURE — 71046 X-RAY EXAM CHEST 2 VIEWS: CPT

## 2025-04-04 PROCEDURE — 36415 COLL VENOUS BLD VENIPUNCTURE: CPT | Performed by: PHYSICIAN ASSISTANT

## 2025-04-04 PROCEDURE — 70498 CT ANGIOGRAPHY NECK: CPT

## 2025-04-04 PROCEDURE — 93005 ELECTROCARDIOGRAM TRACING: CPT

## 2025-04-04 PROCEDURE — 81003 URINALYSIS AUTO W/O SCOPE: CPT | Performed by: PHYSICIAN ASSISTANT

## 2025-04-04 PROCEDURE — 96375 TX/PRO/DX INJ NEW DRUG ADDON: CPT

## 2025-04-04 PROCEDURE — 70496 CT ANGIOGRAPHY HEAD: CPT

## 2025-04-04 PROCEDURE — 83735 ASSAY OF MAGNESIUM: CPT | Performed by: PHYSICIAN ASSISTANT

## 2025-04-04 PROCEDURE — 85730 THROMBOPLASTIN TIME PARTIAL: CPT | Performed by: PHYSICIAN ASSISTANT

## 2025-04-04 PROCEDURE — 99285 EMERGENCY DEPT VISIT HI MDM: CPT | Performed by: PHYSICIAN ASSISTANT

## 2025-04-04 PROCEDURE — 86850 RBC ANTIBODY SCREEN: CPT | Performed by: PHYSICIAN ASSISTANT

## 2025-04-04 PROCEDURE — 96365 THER/PROPH/DIAG IV INF INIT: CPT

## 2025-04-04 PROCEDURE — 80053 COMPREHEN METABOLIC PANEL: CPT | Performed by: PHYSICIAN ASSISTANT

## 2025-04-04 PROCEDURE — 86900 BLOOD TYPING SEROLOGIC ABO: CPT | Performed by: PHYSICIAN ASSISTANT

## 2025-04-04 PROCEDURE — 85610 PROTHROMBIN TIME: CPT | Performed by: PHYSICIAN ASSISTANT

## 2025-04-04 PROCEDURE — 85025 COMPLETE CBC W/AUTO DIFF WBC: CPT | Performed by: PHYSICIAN ASSISTANT

## 2025-04-04 PROCEDURE — 84484 ASSAY OF TROPONIN QUANT: CPT | Performed by: PHYSICIAN ASSISTANT

## 2025-04-04 PROCEDURE — 99285 EMERGENCY DEPT VISIT HI MDM: CPT

## 2025-04-04 PROCEDURE — 86901 BLOOD TYPING SEROLOGIC RH(D): CPT | Performed by: PHYSICIAN ASSISTANT

## 2025-04-04 RX ORDER — KETOROLAC TROMETHAMINE 30 MG/ML
15 INJECTION, SOLUTION INTRAMUSCULAR; INTRAVENOUS ONCE
Status: COMPLETED | OUTPATIENT
Start: 2025-04-04 | End: 2025-04-04

## 2025-04-04 RX ORDER — KETOROLAC TROMETHAMINE 30 MG/ML
15 INJECTION, SOLUTION INTRAMUSCULAR; INTRAVENOUS ONCE
Status: DISCONTINUED | OUTPATIENT
Start: 2025-04-04 | End: 2025-04-04 | Stop reason: HOSPADM

## 2025-04-04 RX ORDER — METOCLOPRAMIDE HYDROCHLORIDE 5 MG/ML
5 INJECTION INTRAMUSCULAR; INTRAVENOUS ONCE
Status: COMPLETED | OUTPATIENT
Start: 2025-04-04 | End: 2025-04-04

## 2025-04-04 RX ORDER — ACETAMINOPHEN 325 MG/1
975 TABLET ORAL ONCE
Status: DISCONTINUED | OUTPATIENT
Start: 2025-04-04 | End: 2025-04-04 | Stop reason: HOSPADM

## 2025-04-04 RX ORDER — MECLIZINE HYDROCHLORIDE 25 MG/1
25 TABLET ORAL ONCE
Status: COMPLETED | OUTPATIENT
Start: 2025-04-04 | End: 2025-04-04

## 2025-04-04 RX ORDER — MECLIZINE HYDROCHLORIDE 25 MG/1
25 TABLET ORAL 3 TIMES DAILY PRN
Qty: 30 TABLET | Refills: 0 | Status: SHIPPED | OUTPATIENT
Start: 2025-04-04

## 2025-04-04 RX ORDER — POTASSIUM CHLORIDE 750 MG/1
20 TABLET, EXTENDED RELEASE ORAL 2 TIMES DAILY
Qty: 28 TABLET | Refills: 0 | Status: SHIPPED | OUTPATIENT
Start: 2025-04-04 | End: 2025-04-11

## 2025-04-04 RX ORDER — DEXAMETHASONE SODIUM PHOSPHATE 10 MG/ML
10 INJECTION, SOLUTION INTRAMUSCULAR; INTRAVENOUS ONCE
Status: COMPLETED | OUTPATIENT
Start: 2025-04-04 | End: 2025-04-04

## 2025-04-04 RX ORDER — DIPHENHYDRAMINE HYDROCHLORIDE 50 MG/ML
25 INJECTION, SOLUTION INTRAMUSCULAR; INTRAVENOUS ONCE
Status: COMPLETED | OUTPATIENT
Start: 2025-04-04 | End: 2025-04-04

## 2025-04-04 RX ORDER — POTASSIUM CHLORIDE 1500 MG/1
40 TABLET, EXTENDED RELEASE ORAL ONCE
Status: COMPLETED | OUTPATIENT
Start: 2025-04-04 | End: 2025-04-04

## 2025-04-04 RX ORDER — PREDNISONE 20 MG/1
40 TABLET ORAL DAILY
Qty: 10 TABLET | Refills: 0 | Status: SHIPPED | OUTPATIENT
Start: 2025-04-04 | End: 2025-04-09

## 2025-04-04 RX ORDER — ONDANSETRON 4 MG/1
4 TABLET, FILM COATED ORAL EVERY 6 HOURS
Qty: 12 TABLET | Refills: 0 | Status: SHIPPED | OUTPATIENT
Start: 2025-04-04

## 2025-04-04 RX ORDER — MAGNESIUM SULFATE HEPTAHYDRATE 40 MG/ML
2 INJECTION, SOLUTION INTRAVENOUS ONCE
Status: COMPLETED | OUTPATIENT
Start: 2025-04-04 | End: 2025-04-04

## 2025-04-04 RX ADMIN — POTASSIUM CHLORIDE 40 MEQ: 1500 TABLET, EXTENDED RELEASE ORAL at 14:09

## 2025-04-04 RX ADMIN — DEXAMETHASONE SODIUM PHOSPHATE 10 MG: 10 INJECTION, SOLUTION INTRAMUSCULAR; INTRAVENOUS at 12:48

## 2025-04-04 RX ADMIN — MAGNESIUM SULFATE HEPTAHYDRATE 2 G: 40 INJECTION, SOLUTION INTRAVENOUS at 14:10

## 2025-04-04 RX ADMIN — SODIUM CHLORIDE 1000 ML: 0.9 INJECTION, SOLUTION INTRAVENOUS at 12:42

## 2025-04-04 RX ADMIN — KETOROLAC TROMETHAMINE 15 MG: 30 INJECTION, SOLUTION INTRAMUSCULAR; INTRAVENOUS at 12:47

## 2025-04-04 RX ADMIN — DIPHENHYDRAMINE HYDROCHLORIDE 25 MG: 50 INJECTION, SOLUTION INTRAMUSCULAR; INTRAVENOUS at 12:45

## 2025-04-04 RX ADMIN — MECLIZINE HYDROCHLORIDE 25 MG: 25 TABLET ORAL at 12:44

## 2025-04-04 RX ADMIN — IOHEXOL 75 ML: 350 INJECTION, SOLUTION INTRAVENOUS at 14:37

## 2025-04-04 RX ADMIN — METOCLOPRAMIDE HYDROCHLORIDE 5 MG: 5 INJECTION INTRAMUSCULAR; INTRAVENOUS at 12:49
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
## 2025-04-04 NOTE — DISCHARGE INSTRUCTIONS
2 mm wide neck infundibulum or aneurysm arising from medial wall of the left ICA cavernous segment. 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 Recommend follow-up with neurovascular service.     At this time your this is similar to vertiginous etiology, continue medications

## 2025-04-04 NOTE — Clinical Note
Chelo Ems was seen and treated in our emergency department on 4/4/2025.                Diagnosis:     Chelo  may return to work on return date, is off the rest of the shift today.    She may return on this date: 04/08/2025         If you have any questions or concerns, please don't hesitate to call.      Romain Lyman PA-C    ______________________________           _______________          _______________  Hospital Representative                              Date                                Time

## 2025-04-07 ENCOUNTER — TELEPHONE (OUTPATIENT)
Dept: FAMILY MEDICINE CLINIC | Facility: CLINIC | Age: 45
End: 2025-04-07

## 2025-04-09 ENCOUNTER — APPOINTMENT (OUTPATIENT)
Dept: LAB | Facility: CLINIC | Age: 45
End: 2025-04-09
Payer: COMMERCIAL

## 2025-04-09 ENCOUNTER — OFFICE VISIT (OUTPATIENT)
Dept: FAMILY MEDICINE CLINIC | Facility: CLINIC | Age: 45
End: 2025-04-09
Payer: COMMERCIAL

## 2025-04-09 VITALS
DIASTOLIC BLOOD PRESSURE: 59 MMHG | WEIGHT: 135.36 LBS | HEART RATE: 84 BPM | OXYGEN SATURATION: 98 % | SYSTOLIC BLOOD PRESSURE: 110 MMHG | BODY MASS INDEX: 23.98 KG/M2

## 2025-04-09 DIAGNOSIS — E04.2 MULTIPLE THYROID NODULES: ICD-10-CM

## 2025-04-09 DIAGNOSIS — F41.0 GENERALIZED ANXIETY DISORDER WITH PANIC ATTACKS: Primary | ICD-10-CM

## 2025-04-09 DIAGNOSIS — E87.6 HYPOKALEMIA: ICD-10-CM

## 2025-04-09 DIAGNOSIS — F41.0 GENERALIZED ANXIETY DISORDER WITH PANIC ATTACKS: ICD-10-CM

## 2025-04-09 DIAGNOSIS — E83.42 HYPOMAGNESEMIA: ICD-10-CM

## 2025-04-09 DIAGNOSIS — R07.9 CHEST PAIN, UNSPECIFIED TYPE: ICD-10-CM

## 2025-04-09 DIAGNOSIS — F41.1 GENERALIZED ANXIETY DISORDER WITH PANIC ATTACKS: ICD-10-CM

## 2025-04-09 DIAGNOSIS — R00.0 TACHYCARDIA: ICD-10-CM

## 2025-04-09 DIAGNOSIS — R00.2 PALPITATIONS: ICD-10-CM

## 2025-04-09 DIAGNOSIS — I67.1: ICD-10-CM

## 2025-04-09 DIAGNOSIS — F41.1 GENERALIZED ANXIETY DISORDER WITH PANIC ATTACKS: Primary | ICD-10-CM

## 2025-04-09 PROBLEM — F41.9 ANXIETY: Status: RESOLVED | Noted: 2023-10-16 | Resolved: 2025-04-09

## 2025-04-09 LAB
ANION GAP SERPL CALCULATED.3IONS-SCNC: 10 MMOL/L (ref 4–13)
BUN SERPL-MCNC: 13 MG/DL (ref 5–25)
CALCIUM SERPL-MCNC: 9.1 MG/DL (ref 8.4–10.2)
CHLORIDE SERPL-SCNC: 106 MMOL/L (ref 96–108)
CO2 SERPL-SCNC: 25 MMOL/L (ref 21–32)
CREAT SERPL-MCNC: 0.95 MG/DL (ref 0.6–1.3)
GFR SERPL CREATININE-BSD FRML MDRD: 74 ML/MIN/1.73SQ M
GLUCOSE SERPL-MCNC: 119 MG/DL (ref 65–140)
MAGNESIUM SERPL-MCNC: 2 MG/DL (ref 1.9–2.7)
POTASSIUM SERPL-SCNC: 3.9 MMOL/L (ref 3.5–5.3)
SODIUM SERPL-SCNC: 141 MMOL/L (ref 135–147)
TSH SERPL DL<=0.05 MIU/L-ACNC: 1.68 UIU/ML (ref 0.45–4.5)

## 2025-04-09 PROCEDURE — 80048 BASIC METABOLIC PNL TOTAL CA: CPT

## 2025-04-09 PROCEDURE — 99214 OFFICE O/P EST MOD 30 MIN: CPT | Performed by: FAMILY MEDICINE

## 2025-04-09 PROCEDURE — 36415 COLL VENOUS BLD VENIPUNCTURE: CPT

## 2025-04-09 PROCEDURE — 84443 ASSAY THYROID STIM HORMONE: CPT

## 2025-04-09 PROCEDURE — 83735 ASSAY OF MAGNESIUM: CPT

## 2025-04-09 NOTE — PROGRESS NOTES
Name: Chelo REDMAN Ems      : 1980      MRN: [id number]  Encounter Provider: Abril Rodas DO  Encounter Date: 2025   Encounter department: [facility] PRIMARY CARE  :  Assessment & Plan  Generalized anxiety disorder with panic attacks  Stable off medications  Discussed importance of establishing with regular therapy  Will check TSH given family history of Graves    Orders:    TSH, 3rd generation with Free T4 reflex; Future      Multiple thyroid nodules  No further eval recommended per radiology read  Given fam Hx and symptoms - will check Tsh    Orders:    TSH, 3rd generation with Free T4 reflex; Future    Chest pain, unspecified type  ER workup reviewed today   EKG : Nonspecific T wave abnormality now evident in Inferior leads per cardiology  Given persistent intermittent symptoms with palpitations and chest tightness - recommend stress test and Holter monitor   Follow up in 4 weeks to reassess    Orders:    Echo stress test, exercise; Future    Holter monitor; Future    Palpitations  ER workup reviewed today   EKG : Nonspecific T wave abnormality now evident in Inferior leads per cardiology  Given persistent intermittent symptoms with palpitations and chest tightness - recommend stress test and Holter monitor   Follow up in 4 weeks to reassess    Orders:    Echo stress test, exercise; Future    Holter monitor; Future    Tachycardia  ER workup reviewed today   EKG : Nonspecific T wave abnormality now evident in Inferior leads per cardiology  Given persistent intermittent symptoms with palpitations and chest tightness - recommend stress test and Holter monitor   Follow up in 4 weeks to reassess    Orders:    Echo stress test, exercise; Future    Hypokalemia  K 3.3 in ER - will recheck     Orders:    Basic metabolic panel; Future    Hypomagnesemia  Mag was 1.7 in ER - will recheck     Orders:    Magnesium; Future    Aneurysm in region of internal carotid artery and  posterior communicating artery  As noted on CTA findings on 4/4  Has upcoming appt with neurosurgery - appreciate recommendations            Return in about 4 weeks (around 5/7/2025) for Recheck.       History of Present Illness   Chief Complaint   Patient presents with    Follow-up     Discuss stress       HPI    Last week she went to the ER for elevated heart rate and chest pain. She reports it seemed to come out of no where. She had a pain in her neck that went into her jaw and so she went to the ER. In The ER on 4/4. She reports headache as well at that time. She also had her watch tell her she was in afib on 3/29. He watch also tells her she has elevated HR a lot. She denies an afib episode reported on here watch since then. She admits life and work has been good and she can't see a reason for the stress her body seems to be physically feeling.     In the ER on 4/4 she CBC, CMP, PT/PTT, troponin, magnesium, diner, UA and trop x2 were completed. Findings of low K and Mag. EKG was completed and reported as sinus tachy by ER provider with HR 94 - cardiology read however reports nonspecific T wave abnormality in inferior leads. Normal CXR   CTA head and neck with major vessels of the [place] without significant stenosis, there was a 2 mm wide neck infundibulum or aneurysm arising from the medial wall of the left ICA cavernous segment, tiny infundibulum or aneurysm of the bilateral hypoplastic P-comm origins.  Recommended follow-up with neurovascular service.  She has upcoming appointment with neurosurgeryApril 28th    There was also a 1.1 cm left thyroid nodule incidentally found without suspicious features, no further evaluation recommended per radiology    She is still occasionally having episodes of feeling like her heart is racing and chest tightness.   She is still having associated headaches     Review of Systems   Constitutional:  Negative for appetite change, chills, fever and unexpected weight  change.   Eyes:  Positive for visual disturbance.   Respiratory:  Positive for chest tightness. Negative for shortness of breath.    Cardiovascular:  Positive for chest pain and palpitations. Negative for leg swelling.   Neurological:  Positive for headaches. Negative for dizziness and light-headedness.   Psychiatric/Behavioral:  Negative for sleep disturbance. The patient is nervous/anxious.      KRISH-7 Flowsheet Screening      Flowsheet Row Most Recent Value   Over the last two weeks, how often have you been bothered by the following problems?     Feeling nervous, anxious, or on edge 1   Not being able to stop or control worrying 1   Worrying too much about different things 1   Trouble relaxing  2   Being so restless that it's hard to sit still 3   Becoming easily annoyed or irritable  1   Feeling afraid as if something awful might happen 1   How difficult have these problems made it for you to do your work, take care of things at home, or get along with other people?  Not difficult at all   KRISH Score  10            Objective   /59 (BP Location: Right arm, Patient Position: Sitting, Cuff Size: Standard)   Pulse 84   Wt 61.4 kg (135 lb 5.8 oz)   LMP 03/12/2025 (Exact Date)   SpO2 98%   BMI 23.98 kg/m²      Physical Exam  Vitals reviewed.   Constitutional:       General: She is not in acute distress.     Appearance: She is well-developed and normal weight. She is not ill-appearing.   HENT:      Head: Normocephalic and atraumatic.      Right Ear: External ear normal.      Left Ear: External ear normal.      Nose: Nose normal.   Eyes:      Extraocular Movements: Extraocular movements intact.      Conjunctiva/sclera: Conjunctivae normal.   Neck:      Vascular: No carotid bruit or JVD.   Cardiovascular:      Rate and Rhythm: Normal rate and regular rhythm.      Heart sounds: Normal heart sounds. No murmur heard.  Pulmonary:      Effort: Pulmonary effort is normal.      Breath sounds: Normal breath sounds.    Abdominal:      General: Abdomen is flat.      Palpations: Abdomen is soft.   Musculoskeletal:      Cervical back: Neck supple.      Right lower leg: No edema.      Left lower leg: No edema.   Neurological:      General: No focal deficit present.      Mental Status: She is alert.   Psychiatric:         Mood and Affect: Mood normal.         Behavior: Behavior normal.

## 2025-04-09 NOTE — ASSESSMENT & PLAN NOTE
Stable off medications  Discussed importance of establishing with regular therapy  Will check TSH given family history of Graves    Orders:    TSH, 3rd generation with Free T4 reflex; Future

## 2025-04-09 NOTE — ASSESSMENT & PLAN NOTE
No further eval recommended per radiology read  Given fam Hx and symptoms - will check Tsh    Orders:    TSH, 3rd generation with Free T4 reflex; Future

## 2025-04-09 NOTE — ASSESSMENT & PLAN NOTE
ER workup reviewed today   EKG 4/4: Nonspecific T wave abnormality now evident in Inferior leads per cardiology  Given persistent intermittent symptoms with palpitations and chest tightness - recommend stress test and Holter monitor   Follow up in 4 weeks to reassess    Orders:    Echo stress test, exercise; Future    Holter monitor; Future

## 2025-04-09 NOTE — ASSESSMENT & PLAN NOTE
As noted on CTA findings on 4/4  Has upcoming appt with neurosurgery - appreciate recommendations

## 2025-04-10 ENCOUNTER — RESULTS FOLLOW-UP (OUTPATIENT)
Dept: FAMILY MEDICINE CLINIC | Facility: CLINIC | Age: 45
End: 2025-04-10

## 2025-04-14 ENCOUNTER — HOSPITAL ENCOUNTER (OUTPATIENT)
Dept: NON INVASIVE DIAGNOSTICS | Facility: HOSPITAL | Age: 45
Discharge: HOME | End: 2025-04-14
Payer: COMMERCIAL

## 2025-04-14 DIAGNOSIS — R00.2 PALPITATIONS: ICD-10-CM

## 2025-04-14 DIAGNOSIS — R07.9 CHEST PAIN, UNSPECIFIED TYPE: ICD-10-CM

## 2025-04-14 PROCEDURE — 93226 XTRNL ECG REC<48 HR SCAN A/R: CPT

## 2025-04-14 PROCEDURE — 93225 XTRNL ECG REC<48 HRS REC: CPT

## 2025-04-21 PROCEDURE — 93227 XTRNL ECG REC<48 HR R&I: CPT | Performed by: INTERNAL MEDICINE

## 2025-04-27 PROBLEM — I67.1 BRAIN ANEURYSM: Status: ACTIVE | Noted: 2025-04-27

## 2025-04-28 ENCOUNTER — OFFICE VISIT (OUTPATIENT)
Dept: NEUROSURGERY | Facility: CLINIC | Age: 45
End: 2025-04-28
Payer: COMMERCIAL

## 2025-04-28 VITALS
SYSTOLIC BLOOD PRESSURE: 110 MMHG | RESPIRATION RATE: 18 BRPM | HEART RATE: 78 BPM | OXYGEN SATURATION: 99 % | TEMPERATURE: 97.6 F | BODY MASS INDEX: 23.92 KG/M2 | DIASTOLIC BLOOD PRESSURE: 64 MMHG | WEIGHT: 135 LBS | HEIGHT: 63 IN

## 2025-04-28 DIAGNOSIS — I67.1 BRAIN ANEURYSM: Primary | ICD-10-CM

## 2025-04-28 DIAGNOSIS — I67.1 ANEURYSM OF CAVERNOUS PORTION OF LEFT INTERNAL CAROTID ARTERY: ICD-10-CM

## 2025-04-28 DIAGNOSIS — I67.1: ICD-10-CM

## 2025-04-28 PROCEDURE — 99204 OFFICE O/P NEW MOD 45 MIN: CPT | Performed by: NURSE PRACTITIONER
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
## 2025-05-16 ENCOUNTER — OFFICE VISIT (OUTPATIENT)
Dept: FAMILY MEDICINE CLINIC | Facility: CLINIC | Age: 45
End: 2025-05-16
Payer: COMMERCIAL

## 2025-05-16 VITALS
BODY MASS INDEX: 24.68 KG/M2 | WEIGHT: 139.33 LBS | DIASTOLIC BLOOD PRESSURE: 60 MMHG | OXYGEN SATURATION: 98 % | TEMPERATURE: 98.2 F | HEART RATE: 82 BPM | SYSTOLIC BLOOD PRESSURE: 122 MMHG

## 2025-05-16 DIAGNOSIS — E83.42 HYPOMAGNESEMIA: ICD-10-CM

## 2025-05-16 DIAGNOSIS — E87.6 HYPOKALEMIA: ICD-10-CM

## 2025-05-16 DIAGNOSIS — R07.9 CHEST PAIN, UNSPECIFIED TYPE: Primary | ICD-10-CM

## 2025-05-16 DIAGNOSIS — R00.2 PALPITATIONS: ICD-10-CM

## 2025-05-16 DIAGNOSIS — I67.1: ICD-10-CM

## 2025-05-16 DIAGNOSIS — R00.0 TACHYCARDIA: ICD-10-CM

## 2025-05-16 PROCEDURE — 99214 OFFICE O/P EST MOD 30 MIN: CPT | Performed by: FAMILY MEDICINE

## 2025-05-16 NOTE — ASSESSMENT & PLAN NOTE
ER workup reviewed  EKG 4/4: Nonspecific T wave abnormality now evident in Inferior leads per cardiology  Given persistent intermittent symptoms with palpitations and chest tightness - recommend stress test and Holter monitor   Holter monitor completed and unremarkable - admittedly did not have sig episode while wearing   Awaiting echo stress test to be completed   Given recurrent episode lasting 4-5 hours this past weekend with -150's on watch -recommend ziopatch - per message I received this can only be ordered by cardiology - refer to cardiology and follow up on stress test results  Reassured patient of overall low ASCVD risk 0.7%   Follow up in 3 mo      Orders:    Ambulatory Referral to Cardiology; Future

## 2025-05-16 NOTE — PROGRESS NOTES
Name: Chleo REDMAN Ems      : 1980      MRN: [id number]  Encounter Provider: Abril Rodas DO  Encounter Date: 2025   Encounter department: [facility] PRIMARY CARE  :  Assessment & Plan  Chest pain, unspecified type  ER workup reviewed  EKG : Nonspecific T wave abnormality now evident in Inferior leads per cardiology  Given persistent intermittent symptoms with palpitations and chest tightness - recommend stress test and Holter monitor   Holter monitor completed and unremarkable - admittedly did not have sig episode while wearing   Awaiting echo stress test to be completed   Given recurrent episode lasting 4-5 hours this past weekend with -150's on watch -recommend ziopatch - per message I received this can only be ordered by cardiology - refer to cardiology and follow up on stress test results  Reassured patient of overall low ASCVD risk 0.7%   Follow up in 3 mo      Orders:    Ambulatory Referral to Cardiology; Future        Palpitations  ER workup reviewed  EKG : Nonspecific T wave abnormality now evident in Inferior leads per cardiology  Given persistent intermittent symptoms with palpitations and chest tightness - recommend stress test and Holter monitor   Holter monitor completed and unremarkable - admittedly did not have sig episode while wearing   Awaiting echo stress test to be completed   Given recurrent episode lasting 4-5 hours this past weekend with -150's on watch -recommend ziopatch - per message I received this can only be ordered by cardiology - refer to cardiology and follow up on stress test results  Reassured patient of overall low ASCVD risk 0.7%   Follow up in 3 mo  Orders:    Ambulatory Referral to Cardiology; Future        Tachycardia  ER workup reviewed  EKG : Nonspecific T wave abnormality now evident in Inferior leads per cardiology  Given persistent intermittent symptoms with palpitations and chest tightness - recommend stress  test and Holter monitor   Holter monitor completed and unremarkable - admittedly did not have sig episode while wearing   Awaiting echo stress test to be completed   Given recurrent episode lasting 4-5 hours this past weekend with -150's on watch -recommend ziopatch - per message I received this can only be ordered by cardiology - refer to cardiology and follow up on stress test results  Reassured patient of overall low ASCVD risk 0.7%   Follow up in 3 mo  Orders:    Ambulatory Referral to Cardiology; Future    Hypomagnesemia  Mag was 1.7 in ER   Resolved on recent labs           Hypokalemia  K 3.3 in ER - will recheck   Resolved on recent labs           Aneurysm in region of internal carotid artery and posterior communicating artery  As noted on CTA findings on 4/4  Now following with neuroSx - recent note reviewed today, recommend annual imaging to monitor, ER precautions discussed                    Return in about 3 months (around 8/16/2025) for Recheck chest pain .       History of Present Illness   Chief Complaint   Patient presents with    Follow-up      Return in about 4 weeks (around 5/7/2025) for Recheck       HPI    Patient presents for follow up chest pain. We are still awaiting stress test to be completed. Holter was completed and unremarkable.   She currently reports Saturday last weekend she had an episode where she was walking slow paced and had been hydrating but her HR was very high and she felt like her chest was going to explode. This lasted for a few hours and self resolved. She reports this was really the only episode she had since the ER. Her HR was 140-150's the entire time. She reports while wearing the holter, there was no episodes like this or what took her to the ER.   She saw Neuro surgery and they recommend optimizing her CV risk factors.   She continues to report her anxiety is well controlled. She has not noticed an increase in this and does not feel it is contributing factor.      Review of Systems   Constitutional:  Negative for appetite change, chills, fever and unexpected weight change.   Eyes:  Negative for visual disturbance.   Respiratory:  Positive for chest tightness. Negative for shortness of breath.    Cardiovascular:  Positive for chest pain and palpitations. Negative for leg swelling.   Neurological:  Positive for headaches. Negative for dizziness and light-headedness.   Psychiatric/Behavioral:  Negative for sleep disturbance. The patient is not nervous/anxious.        Objective   /60 (BP Location: Right arm, Patient Position: Sitting, Cuff Size: Standard)   Pulse 82   Temp 98.2 °F (36.8 °C) (Tympanic)   Wt 63.2 kg (139 lb 5.3 oz)   SpO2 98%   BMI 24.68 kg/m²      Physical Exam  Vitals reviewed.   Constitutional:       General: She is not in acute distress.     Appearance: She is well-developed and normal weight. She is not ill-appearing.   HENT:      Head: Normocephalic and atraumatic.      Right Ear: External ear normal.      Left Ear: External ear normal.      Nose: Nose normal.     Eyes:      Extraocular Movements: Extraocular movements intact.      Conjunctiva/sclera: Conjunctivae normal.     Neck:      Vascular: No carotid bruit or JVD.     Cardiovascular:      Rate and Rhythm: Normal rate and regular rhythm.      Heart sounds: Normal heart sounds. No murmur heard.  Pulmonary:      Effort: Pulmonary effort is normal.     Musculoskeletal:      Cervical back: Neck supple.      Right lower leg: No edema.      Left lower leg: No edema.     Neurological:      General: No focal deficit present.      Mental Status: She is alert.     Psychiatric:         Mood and Affect: Mood normal.         Behavior: Behavior normal.

## 2025-05-16 NOTE — ASSESSMENT & PLAN NOTE
As noted on CTA findings on 4/4  Now following with neuroSx - recent note reviewed today, recommend annual imaging to monitor, ER precautions discussed

## 2025-06-09 ENCOUNTER — HOSPITAL ENCOUNTER (OUTPATIENT)
Dept: NON INVASIVE DIAGNOSTICS | Facility: HOSPITAL | Age: 45
Discharge: HOME | End: 2025-06-09
Payer: COMMERCIAL

## 2025-06-09 DIAGNOSIS — R00.2 PALPITATIONS: ICD-10-CM

## 2025-06-09 DIAGNOSIS — R07.9 CHEST PAIN, UNSPECIFIED TYPE: ICD-10-CM

## 2025-06-09 DIAGNOSIS — R00.0 TACHYCARDIA: ICD-10-CM

## 2025-06-09 LAB
MAX HR PERCENT: 97 %
MAX HR: 173 BPM
POST LVEF: 75 %
RATE PRESSURE PRODUCT: NORMAL
SL CV LV EF: 60
SL CV STRESS RECOVERY BP: NORMAL MMHG
SL CV STRESS RECOVERY HR: 95 BPM
SL CV STRESS RECOVERY O2 SAT: 99 %
SL CV STRESS STAGE REACHED: 3
STRESS ANGINA INDEX: 1
STRESS BASELINE BP: NORMAL MMHG
STRESS BASELINE HR: 73 BPM
STRESS O2 SAT REST: 99 %
STRESS PEAK HR: 173 BPM
STRESS POST ESTIMATED WORKLOAD: 8.7 METS
STRESS POST EXERCISE DUR MIN: 6 MIN
STRESS POST EXERCISE DUR SEC: 34 SEC
STRESS POST O2 SAT PEAK: 99 %
STRESS POST PEAK BP: 158 MMHG

## 2025-06-09 PROCEDURE — 93350 STRESS TTE ONLY: CPT

## 2025-06-09 RX ORDER — NORELGESTROMIN AND ETHINYL ESTRADIOL 35; 150 UG/MG; UG/MG
1 PATCH TRANSDERMAL WEEKLY
COMMUNITY
End: 2025-06-09

## 2025-06-10 LAB
CHEST PAIN STATEMENT: NORMAL
MAX DIASTOLIC BP: 64 MMHG
MAX PREDICTED HEART RATE: 177 BPM
PROTOCOL NAME: NORMAL
REASON FOR TERMINATION: NORMAL
STRESS POST EXERCISE DUR MIN: 6 MIN
STRESS POST EXERCISE DUR SEC: 34 SEC
STRESS POST PEAK HR: 173 BPM
STRESS POST PEAK SYSTOLIC BP: 158 MMHG
TARGET HR FORMULA: NORMAL
TEST INDICATION: NORMAL

## 2025-07-03 ENCOUNTER — OFFICE VISIT (OUTPATIENT)
Dept: GYNECOLOGY | Facility: CLINIC | Age: 45
End: 2025-07-03
Payer: COMMERCIAL

## 2025-07-03 VITALS
WEIGHT: 141.2 LBS | DIASTOLIC BLOOD PRESSURE: 72 MMHG | HEART RATE: 88 BPM | SYSTOLIC BLOOD PRESSURE: 100 MMHG | HEIGHT: 63 IN | BODY MASS INDEX: 25.02 KG/M2

## 2025-07-03 DIAGNOSIS — N93.9 ABNORMAL UTERINE BLEEDING (AUB): Primary | ICD-10-CM

## 2025-07-03 PROCEDURE — 88305 TISSUE EXAM BY PATHOLOGIST: CPT | Performed by: PATHOLOGY

## 2025-07-03 PROCEDURE — 58100 BIOPSY OF UTERUS LINING: CPT | Performed by: OBSTETRICS & GYNECOLOGY

## 2025-07-03 PROCEDURE — 88342 IMHCHEM/IMCYTCHM 1ST ANTB: CPT | Performed by: PATHOLOGY

## 2025-07-03 PROCEDURE — 99213 OFFICE O/P EST LOW 20 MIN: CPT | Performed by: OBSTETRICS & GYNECOLOGY

## 2025-07-03 RX ORDER — MEDROXYPROGESTERONE ACETATE 10 MG
10 TABLET ORAL DAILY
Qty: 10 TABLET | Refills: 0 | Status: SHIPPED | OUTPATIENT
Start: 2025-07-03 | End: 2025-07-13

## 2025-07-03 RX ORDER — NAPROXEN SODIUM 550 MG/1
550 TABLET ORAL 2 TIMES DAILY WITH MEALS
Qty: 30 TABLET | Refills: 0 | Status: SHIPPED | OUTPATIENT
Start: 2025-07-03 | End: 2025-07-18
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
## 2025-07-11 PROCEDURE — 88305 TISSUE EXAM BY PATHOLOGIST: CPT | Performed by: PATHOLOGY

## 2025-07-11 PROCEDURE — 88342 IMHCHEM/IMCYTCHM 1ST ANTB: CPT | Performed by: PATHOLOGY

## 2025-07-22 NOTE — PROGRESS NOTES
"AMB US Pelvic Non OB    Date/Time: 7/23/2025 1:00 PM    Performed by: Christen Gomez  Authorized by: Finesse Keyes DO    Universal Protocol:  Consent: Verbal consent obtained  Consent given by: patient  Time out: Immediately prior to procedure a \"time out\" was called to verify the correct patient, procedure, equipment, support staff and site/side marked as required.  Timeout called at: 7/23/2025 12:52 PM.  Patient understanding: patient states understanding of the procedure being performed  Patient identity confirmed: verbally with patient    Procedure details:     SIS Procedure: Yes    Technique:  Transvaginal US, Non-OB    Position: lithotomy exam    Uterine findings:     Length (cm): 9.52    Height (cm):  5.06    Width (cm):  6.59    Endometrial stripe: identified      Endometrium thickness (mm):  17.1  Left ovary findings:     Left ovary:  Visualized    Length (cm): 3.7    Height (cm): 2.34    Width (cm): 2.28  Right ovary findings:     Right ovary:  Visualized    Length (cm): 5.11    Height (cm): 3.08    Width (cm): 4.03  Other findings:     Free pelvic fluid: identified    Post-Procedure Details:     Impression:  Anteverted uterus demonstrates an intramural fibroid 1.2cm. The endometrium is thickened and echogenic. There appears to be a polyp present with a feeder vessel noted on color doppler.  The right ovary demonstrates a hemorrhagic appearing cyst, 3.2cm. The left ovary appears within normal limits. There is free fluid in the cul de sac.     Tolerance:  Tolerated well, no immediate complications    Complications: no complications    Additional Procedure Comments:      GE Voluson P8 transvaginal transducer RIC5-RA with Serial Number [id number] was used during procedure and subsequently cleaned with high level disinfection utilizing the [employer] EPR Probe .     Ultrasound performed at:     [facility]  [address] [place] [address]  [address] [place] [address] " "[id number]  Phone: [phone]  Fax:  [phone]      Sonohysterogram    Date/Time: 7/23/2025 1:00 PM    Performed by: Finesse Keyes DO  Authorized by: Finesse Keyes DO    Universal Protocol:  Consent: Verbal consent obtained  Consent given by: patient  Time out: Immediately prior to procedure a \"time out\" was called to verify the correct patient, procedure, equipment, support staff and site/side marked as required.  Timeout called at: 7/23/2025 12:52 PM.  Patient understanding: patient states understanding of the procedure being performed  Patient identity confirmed: verbally with patient    Pre-procedure:     Prepped with: Betadine    Procedure:     Cervix cleaned and prepped: yes      Tenaculum applied to cervix: yes      Uterus sounded: yes      Catheter inserted: yes      Uterine cavity distended with saline: yes    Post-procedure:     Patient observed: yes      Post procedure instructions given to patient: yes      Patient tolerated procedure well with no complications: yes    Comments:      Sonohysterogram demonstrates polyps within the endometrium.   Endometrial biopsy    Date/Time: 7/23/2025 1:00 PM    Performed by: Christen Gomez  Authorized by: Finesse Keyes DO    Universal Protocol:  Consent: Verbal consent obtained  Consent given by: patient  Time out: Immediately prior to procedure a \"time out\" was called to verify the correct patient, procedure, equipment, support staff and site/side marked as required.  Timeout called at: 7/23/2025 1:02 PM.  Patient understanding: patient states understanding of the procedure being performed  Patient identity confirmed: verbally with patient    Indication:     Indications: Other disorder of menstruation and other abnormal bleeding from female genital tract    Procedure:     A bivalve speculum was placed in the vagina: yes      Cervix cleaned and prepped: yes      Specimen collected: specimen collected and sent to pathology      Patient tolerated " procedure well with no complications: yes

## 2025-07-23 ENCOUNTER — ULTRASOUND (OUTPATIENT)
Dept: GYNECOLOGY | Facility: CLINIC | Age: 45
End: 2025-07-23
Payer: COMMERCIAL

## 2025-07-23 ENCOUNTER — OFFICE VISIT (OUTPATIENT)
Dept: GYNECOLOGY | Facility: CLINIC | Age: 45
End: 2025-07-23
Payer: COMMERCIAL

## 2025-07-23 DIAGNOSIS — N93.9 ABNORMAL UTERINE BLEEDING (AUB): Primary | ICD-10-CM

## 2025-07-23 DIAGNOSIS — Z01.818 PREOP TESTING: ICD-10-CM

## 2025-07-23 DIAGNOSIS — N84.0 ENDOMETRIAL POLYP: ICD-10-CM

## 2025-07-23 PROCEDURE — 76831 ECHO EXAM UTERUS: CPT | Performed by: OBSTETRICS & GYNECOLOGY

## 2025-07-23 PROCEDURE — 58340 CATHETER FOR HYSTEROGRAPHY: CPT | Performed by: OBSTETRICS & GYNECOLOGY

## 2025-07-23 PROCEDURE — 88305 TISSUE EXAM BY PATHOLOGIST: CPT | Performed by: PATHOLOGY

## 2025-07-23 PROCEDURE — 99215 OFFICE O/P EST HI 40 MIN: CPT | Performed by: OBSTETRICS & GYNECOLOGY

## 2025-07-23 PROCEDURE — 58100 BIOPSY OF UTERUS LINING: CPT | Performed by: OBSTETRICS & GYNECOLOGY

## 2025-07-23 NOTE — PROGRESS NOTES
"Name: Chelo Boykin      : 1980      MRN: [id number]  Encounter Provider: Finesse Keyes DO  Encounter Date: 2025   Encounter department: [facility]  :  Assessment & Plan  Abnormal uterine bleeding (AUB)         Endometrial polyp  Patient mated for hysteroscopy D&C polypectomy.  Procedure and risk reviewed.           History of Present Illness   HPI  Chelo Boykin is a 45 y.o. female who presents complaining of abnormal uterine bleeding over the past few months.  She states she is having episodes of bleeding every 2 weeks.  She has had a prior tubal.  She is also experiencing some pelvic pain and cramping.  She had an endometrial biopsy done which showed weakly proliferative endometrium with focal glandular crowding.  She has had a prior D&C hysteroscopy polypectomy in 2023    TVS SIS     Procedure Orders   AMB US Pelvic Non OB [[id number]] ordered by Christen Gomez   Sonohysterogram [[id number]] ordered by Christen Gomez   Endometrial biopsy [[id number]] ordered by Christen Gomez     Post-procedure Diagnoses   Abnormal uterine bleeding (AUB) [N93.9]          Cosign Needed         AMB US Pelvic Non OB     Date/Time: 2025 1:00 PM     Performed by: Christen Gomez  Authorized by: Finesse Keyes DO    Universal Protocol:  Consent: Verbal consent obtained  Consent given by: patient  Time out: Immediately prior to procedure a \"time out\" was called to verify the correct patient, procedure, equipment, support staff and site/side marked as required.  Timeout called at: 2025 12:52 PM.  Patient understanding: patient states understanding of the procedure being performed  Patient identity confirmed: verbally with patient     Procedure details:     SIS Procedure: Yes    Technique:  Transvaginal US, Non-OB    Position: lithotomy exam    Uterine findings:     Length (cm): 9.52    Height (cm):  5.06    Width (cm):  6.59    Endometrial " "stripe: identified      Endometrium thickness (mm):  17.1  Left ovary findings:     Left ovary:  Visualized    Length (cm): 3.7    Height (cm): 2.34    Width (cm): 2.28  Right ovary findings:     Right ovary:  Visualized    Length (cm): 5.11    Height (cm): 3.08    Width (cm): 4.03  Other findings:     Free pelvic fluid: identified    Post-Procedure Details:     Impression:  Anteverted uterus demonstrates an intramural fibroid 1.2cm. The endometrium is thickened and echogenic. There appears to be a polyp present with a feeder vessel noted on color doppler.  The right ovary demonstrates a hemorrhagic appearing cyst, 3.2cm. The left ovary appears within normal limits. There is free fluid in the cul de sac.     Tolerance:  Tolerated well, no immediate complications    Complications: no complications    Additional Procedure Comments:      GE Voluson P8 transvaginal transducer RIC5-RA with Serial Number [id number] was used during procedure and subsequently cleaned with high level disinfection utilizing the [employer] EPR Probe .      Ultrasound performed at:      [facility]  [address] [place] [address]  Phone: [phone]  Fax:  [phone]        Sonohysterogram     Date/Time: 7/23/2025 1:00 PM     Performed by: Finesse Keyes DO  Authorized by: Finesse Keyes DO    Universal Protocol:  Consent: Verbal consent obtained  Consent given by: patient  Time out: Immediately prior to procedure a \"time out\" was called to verify the correct patient, procedure, equipment, support staff and site/side marked as required.  Timeout called at: 7/23/2025 12:52 PM.  Patient understanding: patient states understanding of the procedure being performed  Patient identity confirmed: verbally with patient     Pre-procedure:     Prepped with: Betadine    Procedure:     Cervix cleaned and prepped: yes      Tenaculum applied to cervix: yes      Uterus sounded: yes      " "Catheter inserted: yes      Uterine cavity distended with saline: yes    Post-procedure:     Patient observed: yes      Post procedure instructions given to patient: yes      Patient tolerated procedure well with no complications: yes    Comments:      Sonohysterogram demonstrates polyps within the endometrium.   Endometrial biopsy     Date/Time: 7/23/2025 1:00 PM     Performed by: Christen Gomez  Authorized by: Finesse Keyes DO    Universal Protocol:  Consent: Verbal consent obtained  Consent given by: patient  Time out: Immediately prior to procedure a \"time out\" was called to verify the correct patient, procedure, equipment, support staff and site/side marked as required.  Timeout called at: 7/23/2025 1:02 PM.  Patient understanding: patient states understanding of the procedure being performed  Patient identity confirmed: verbally with patient     Indication:     Indications: Other disorder of menstruation and other abnormal bleeding from female genital tract    Procedure:     A bivalve speculum was placed in the vagina: yes      Cervix cleaned and prepped: yes      Specimen collected: specimen collected and sent to pathology      Patient tolerated procedure well with no complications: yes                      Review of Systems       Objective   LMP 06/23/2025      Physical Exam  Vitals reviewed.     Cardiovascular:      Rate and Rhythm: Normal rate and regular rhythm.      Pulses: Normal pulses.      Heart sounds: Normal heart sounds.   Pulmonary:      Effort: Pulmonary effort is normal.      Breath sounds: Normal breath sounds.   Abdominal:      Palpations: Abdomen is soft.      Hernia: There is no hernia in the left inguinal area or right inguinal area.   Genitourinary:     General: Normal vulva.      Labia:         Right: No rash, tenderness or lesion.         Left: No rash, tenderness or lesion.       Vagina: Normal.      Cervix: Normal.      Uterus: Normal.       Adnexa:         Right: No mass, " tenderness or fullness.          Left: No mass, tenderness or fullness.     Lymphadenopathy:      Lower Body: No right inguinal adenopathy. No left inguinal adenopathy.     Neurological:      Mental Status: She is alert.

## 2025-07-29 PROCEDURE — 88305 TISSUE EXAM BY PATHOLOGIST: CPT | Performed by: PATHOLOGY

## 2025-07-30 ENCOUNTER — TELEPHONE (OUTPATIENT)
Dept: GYNECOLOGY | Facility: CLINIC | Age: 45
End: 2025-07-30

## 2025-08-01 ENCOUNTER — OFFICE VISIT (OUTPATIENT)
Dept: FAMILY MEDICINE CLINIC | Facility: CLINIC | Age: 45
End: 2025-08-01
Payer: COMMERCIAL

## 2025-08-01 VITALS
HEART RATE: 72 BPM | DIASTOLIC BLOOD PRESSURE: 65 MMHG | WEIGHT: 141.09 LBS | BODY MASS INDEX: 24.99 KG/M2 | OXYGEN SATURATION: 98 % | SYSTOLIC BLOOD PRESSURE: 105 MMHG

## 2025-08-01 DIAGNOSIS — N63.25 BREAST LUMP ON LEFT SIDE AT 6 O'CLOCK POSITION: Primary | ICD-10-CM

## 2025-08-01 PROCEDURE — 99213 OFFICE O/P EST LOW 20 MIN: CPT | Performed by: FAMILY MEDICINE

## 2025-08-21 ENCOUNTER — HOSPITAL ENCOUNTER (EMERGENCY)
Facility: HOSPITAL | Age: 45
Discharge: HOME | End: 2025-08-21
Attending: EMERGENCY MEDICINE | Admitting: EMERGENCY MEDICINE
Payer: COMMERCIAL

## 2025-08-21 ENCOUNTER — APPOINTMENT (EMERGENCY)
Dept: ULTRASOUND IMAGING | Facility: HOSPITAL | Age: 45
End: 2025-08-21
Payer: COMMERCIAL

## 2025-08-21 ENCOUNTER — APPOINTMENT (EMERGENCY)
Dept: CT IMAGING | Facility: HOSPITAL | Age: 45
End: 2025-08-21
Payer: COMMERCIAL

## 2025-08-21 VITALS
RESPIRATION RATE: 16 BRPM | WEIGHT: 143.74 LBS | BODY MASS INDEX: 25.46 KG/M2 | TEMPERATURE: 98.9 F | DIASTOLIC BLOOD PRESSURE: 56 MMHG | SYSTOLIC BLOOD PRESSURE: 114 MMHG | OXYGEN SATURATION: 99 % | HEART RATE: 63 BPM

## 2025-08-21 DIAGNOSIS — N84.0 ENDOMETRIAL POLYP: ICD-10-CM

## 2025-08-21 DIAGNOSIS — N83.201 CYST OF RIGHT OVARY: ICD-10-CM

## 2025-08-21 DIAGNOSIS — R10.31 RIGHT LOWER QUADRANT ABDOMINAL PAIN: Primary | ICD-10-CM

## 2025-08-21 LAB
ALBUMIN SERPL BCG-MCNC: 4.4 G/DL (ref 3.5–5)
ALP SERPL-CCNC: 51 U/L (ref 34–104)
ALT SERPL W P-5'-P-CCNC: 25 U/L (ref 7–52)
ANION GAP SERPL CALCULATED.3IONS-SCNC: 4 MMOL/L (ref 4–13)
AST SERPL W P-5'-P-CCNC: 18 U/L (ref 13–39)
BACTERIA UR QL AUTO: NORMAL /HPF
BASOPHILS # BLD AUTO: 0.08 THOUSANDS/ÂΜL (ref 0–0.1)
BASOPHILS NFR BLD AUTO: 1 % (ref 0–1)
BILIRUB SERPL-MCNC: 0.59 MG/DL (ref 0.2–1)
BILIRUB UR QL STRIP: NEGATIVE
BUN SERPL-MCNC: 10 MG/DL (ref 5–25)
CALCIUM SERPL-MCNC: 8.8 MG/DL (ref 8.4–10.2)
CHLORIDE SERPL-SCNC: 108 MMOL/L (ref 96–108)
CLARITY UR: ABNORMAL
CO2 SERPL-SCNC: 26 MMOL/L (ref 21–32)
COLOR UR: ABNORMAL
CREAT SERPL-MCNC: 0.68 MG/DL (ref 0.6–1.3)
EOSINOPHIL # BLD AUTO: 0.23 THOUSAND/ÂΜL (ref 0–0.61)
EOSINOPHIL NFR BLD AUTO: 3 % (ref 0–6)
ERYTHROCYTE [DISTWIDTH] IN BLOOD BY AUTOMATED COUNT: 12.2 % (ref 11.6–15.1)
EXT PREGNANCY TEST URINE: NEGATIVE
EXT. CONTROL: NORMAL
GFR SERPL CREATININE-BSD FRML MDRD: 107 ML/MIN/1.73SQ M
GLUCOSE SERPL-MCNC: 82 MG/DL (ref 65–140)
GLUCOSE UR STRIP-MCNC: NEGATIVE MG/DL
HCT VFR BLD AUTO: 43.7 % (ref 34.8–46.1)
HGB BLD-MCNC: 14.3 G/DL (ref 11.5–15.4)
HGB UR QL STRIP.AUTO: NEGATIVE
IMM GRANULOCYTES # BLD AUTO: 0.04 THOUSAND/UL (ref 0–0.2)
IMM GRANULOCYTES NFR BLD AUTO: 1 % (ref 0–2)
KETONES UR STRIP-MCNC: NEGATIVE MG/DL
LEUKOCYTE ESTERASE UR QL STRIP: ABNORMAL
LIPASE SERPL-CCNC: 43 U/L (ref 11–82)
LYMPHOCYTES # BLD AUTO: 1.79 THOUSANDS/ÂΜL (ref 0.6–4.47)
LYMPHOCYTES NFR BLD AUTO: 23 % (ref 14–44)
MCH RBC QN AUTO: 30.3 PG (ref 26.8–34.3)
MCHC RBC AUTO-ENTMCNC: 32.7 G/DL (ref 31.4–37.4)
MCV RBC AUTO: 93 FL (ref 82–98)
MONOCYTES # BLD AUTO: 0.57 THOUSAND/ÂΜL (ref 0.17–1.22)
MONOCYTES NFR BLD AUTO: 8 % (ref 4–12)
NEUTROPHILS # BLD AUTO: 4.94 THOUSANDS/ÂΜL (ref 1.85–7.62)
NEUTS SEG NFR BLD AUTO: 64 % (ref 43–75)
NITRITE UR QL STRIP: NEGATIVE
NON-SQ EPI CELLS URNS QL MICRO: NORMAL /HPF
NRBC BLD AUTO-RTO: 0 /100 WBCS
PH UR STRIP.AUTO: 6 [PH]
PLATELET # BLD AUTO: 274 THOUSANDS/UL (ref 149–390)
PMV BLD AUTO: 8.9 FL (ref 8.9–12.7)
POTASSIUM SERPL-SCNC: 4 MMOL/L (ref 3.5–5.3)
PROT SERPL-MCNC: 7.4 G/DL (ref 6.4–8.4)
PROT UR STRIP-MCNC: NEGATIVE MG/DL
RBC # BLD AUTO: 4.72 MILLION/UL (ref 3.81–5.12)
RBC #/AREA URNS AUTO: NORMAL /HPF
SODIUM SERPL-SCNC: 138 MMOL/L (ref 135–147)
SP GR UR STRIP.AUTO: <=1.005 (ref 1–1.03)
UROBILINOGEN UR QL STRIP.AUTO: 0.2 E.U./DL
WBC # BLD AUTO: 7.65 THOUSAND/UL (ref 4.31–10.16)
WBC #/AREA URNS AUTO: NORMAL /HPF

## 2025-08-21 PROCEDURE — 99285 EMERGENCY DEPT VISIT HI MDM: CPT | Performed by: EMERGENCY MEDICINE

## 2025-08-21 PROCEDURE — 36415 COLL VENOUS BLD VENIPUNCTURE: CPT | Performed by: EMERGENCY MEDICINE

## 2025-08-21 PROCEDURE — 74177 CT ABD & PELVIS W/CONTRAST: CPT

## 2025-08-21 PROCEDURE — 96375 TX/PRO/DX INJ NEW DRUG ADDON: CPT

## 2025-08-21 PROCEDURE — 99284 EMERGENCY DEPT VISIT MOD MDM: CPT

## 2025-08-21 PROCEDURE — 76830 TRANSVAGINAL US NON-OB: CPT

## 2025-08-21 PROCEDURE — 80053 COMPREHEN METABOLIC PANEL: CPT | Performed by: EMERGENCY MEDICINE

## 2025-08-21 PROCEDURE — 81001 URINALYSIS AUTO W/SCOPE: CPT | Performed by: EMERGENCY MEDICINE

## 2025-08-21 PROCEDURE — 81025 URINE PREGNANCY TEST: CPT | Performed by: EMERGENCY MEDICINE

## 2025-08-21 PROCEDURE — 76856 US EXAM PELVIC COMPLETE: CPT

## 2025-08-21 PROCEDURE — 96374 THER/PROPH/DIAG INJ IV PUSH: CPT

## 2025-08-21 PROCEDURE — 96361 HYDRATE IV INFUSION ADD-ON: CPT

## 2025-08-21 PROCEDURE — 83690 ASSAY OF LIPASE: CPT | Performed by: EMERGENCY MEDICINE

## 2025-08-21 PROCEDURE — 85025 COMPLETE CBC W/AUTO DIFF WBC: CPT | Performed by: EMERGENCY MEDICINE

## 2025-08-21 PROCEDURE — 96376 TX/PRO/DX INJ SAME DRUG ADON: CPT

## 2025-08-21 RX ORDER — KETOROLAC TROMETHAMINE 30 MG/ML
15 INJECTION, SOLUTION INTRAMUSCULAR; INTRAVENOUS ONCE
Status: COMPLETED | OUTPATIENT
Start: 2025-08-21 | End: 2025-08-21

## 2025-08-21 RX ORDER — ONDANSETRON 2 MG/ML
4 INJECTION INTRAMUSCULAR; INTRAVENOUS ONCE
Status: COMPLETED | OUTPATIENT
Start: 2025-08-21 | End: 2025-08-21

## 2025-08-21 RX ADMIN — IOHEXOL 100 ML: 350 INJECTION, SOLUTION INTRAVENOUS at 08:51

## 2025-08-21 RX ADMIN — ONDANSETRON 4 MG: 2 INJECTION INTRAMUSCULAR; INTRAVENOUS at 07:59

## 2025-08-21 RX ADMIN — KETOROLAC TROMETHAMINE 15 MG: 30 INJECTION, SOLUTION INTRAMUSCULAR at 07:59

## 2025-08-21 RX ADMIN — SODIUM CHLORIDE 1000 ML: 0.9 INJECTION, SOLUTION INTRAVENOUS at 07:55

## 2025-08-21 RX ADMIN — KETOROLAC TROMETHAMINE 15 MG: 30 INJECTION, SOLUTION INTRAMUSCULAR at 08:59

## (undated) DEVICE — SEAL

## (undated) DEVICE — 2, DISPOSABLE SUCTION/IRRIGATOR WITHOUT DISPOSABLE TIP: Brand: STRYKEFLOW

## (undated) DEVICE — ARM DRAPE

## (undated) DEVICE — GLOVE SRG BIOGEL ORTHOPEDIC 7.5

## (undated) DEVICE — Device: Brand: OMNICLOSE TROCAR SITE CLOSURE DEVICE

## (undated) DEVICE — DRAPE LAPAROTOMY W/POUCHES

## (undated) DEVICE — TUBING SMOKE EVAC W/FILTRATION DEVICE PLUMEPORT ACTIV

## (undated) DEVICE — CANNULA SEAL

## (undated) DEVICE — PERMANENT CAUTERY HOOK: Brand: ENDOWRIST

## (undated) DEVICE — STRL ALLENTOWN HYSTEROSCOPY PK: Brand: CARDINAL HEALTH

## (undated) DEVICE — 3M™ IOBAN™ 2 ANTIMICROBIAL INCISE DRAPE 6650EZ: Brand: IOBAN™ 2

## (undated) DEVICE — PVC URETHRAL CATHETER: Brand: DOVER

## (undated) DEVICE — CYSTO TUBING SINGLE IRRIGATION

## (undated) DEVICE — GLOVE INDICATOR PI UNDERGLOVE SZ 8 BLUE

## (undated) DEVICE — REDUCER: Brand: ENDOWRIST

## (undated) DEVICE — DRAPE EQUIPMENT RF WAND

## (undated) DEVICE — INTENDED FOR TISSUE SEPARATION, AND OTHER PROCEDURES THAT REQUIRE A SHARP SURGICAL BLADE TO PUNCTURE OR CUT.: Brand: BARD-PARKER SAFETY BLADES SIZE 11, STERILE

## (undated) DEVICE — INTENDED FOR TISSUE SEPARATION, AND OTHER PROCEDURES THAT REQUIRE A SHARP SURGICAL BLADE TO PUNCTURE OR CUT.: Brand: BARD-PARKER SAFETY BLADES SIZE 15, STERILE

## (undated) DEVICE — GLOVE PI ULTRA TOUCH SZ.7.5

## (undated) DEVICE — VISUALIZATION SYSTEM: Brand: CLEARIFY

## (undated) DEVICE — PACK PBDS LAP CHOLE RF

## (undated) DEVICE — EXOFIN PRECISION PEN HIGH VISCOSITY TOPICAL SKIN ADHESIVE: Brand: EXOFIN PRECISION PEN, 1G

## (undated) DEVICE — HEAVY DUTY TABLE COVER: Brand: CONVERTORS

## (undated) DEVICE — SCD SEQUENTIAL COMPRESSION COMFORT SLEEVE MEDIUM KNEE LENGTH: Brand: KENDALL SCD

## (undated) DEVICE — IV EXTENSION TUBING 33 IN

## (undated) DEVICE — SUT SILK 2-0 18 IN A185H

## (undated) DEVICE — CLAMP TOWEL TUBING DISPOSABLE

## (undated) DEVICE — COLUMN DRAPE

## (undated) DEVICE — SUT MONOCRYL 4-0 PS-2 18 IN Y496G

## (undated) DEVICE — DRAPE SHEET THREE QUARTER

## (undated) DEVICE — BLADELESS OBTURATOR: Brand: WECK VISTA

## (undated) DEVICE — 3000CC GUARDIAN II: Brand: GUARDIAN

## (undated) DEVICE — FORCE BIPOLAR: Brand: ENDOWRIST

## (undated) DEVICE — HEM-O-LOK CLIP CARTRIDGE MED/LARGE DA VINCI SI/XI

## (undated) DEVICE — SUT VICRYL PLUS 0 UR-6 27IN VCP603H

## (undated) DEVICE — TISSUE RETRIEVAL SYSTEM: Brand: INZII RETRIEVAL SYSTEM

## (undated) DEVICE — MEDIUM-LARGE CLIP APPLIER: Brand: ENDOWRIST

## (undated) DEVICE — PREMIUM DRY TRAY LF: Brand: MEDLINE INDUSTRIES, INC.

## (undated) DEVICE — INSUFFLATION NEEDLE TO ESTABLISH PNEUMOPERITONEUM.: Brand: INSUFFLATION NEEDLE